# Patient Record
Sex: MALE | Race: WHITE | NOT HISPANIC OR LATINO | Employment: OTHER | URBAN - METROPOLITAN AREA
[De-identification: names, ages, dates, MRNs, and addresses within clinical notes are randomized per-mention and may not be internally consistent; named-entity substitution may affect disease eponyms.]

---

## 2018-02-26 ENCOUNTER — OFFICE VISIT (OUTPATIENT)
Dept: ORTHOPEDIC SURGERY | Facility: CLINIC | Age: 73
End: 2018-02-26

## 2018-02-26 ENCOUNTER — TELEPHONE (OUTPATIENT)
Dept: ORTHOPEDIC SURGERY | Facility: CLINIC | Age: 73
End: 2018-02-26

## 2018-02-26 VITALS — HEIGHT: 71 IN | BODY MASS INDEX: 28.7 KG/M2 | TEMPERATURE: 98.3 F | WEIGHT: 205 LBS

## 2018-02-26 DIAGNOSIS — S83.241A TEAR OF MEDIAL MENISCUS OF RIGHT KNEE, CURRENT, UNSPECIFIED TEAR TYPE, INITIAL ENCOUNTER: ICD-10-CM

## 2018-02-26 DIAGNOSIS — M17.11 PRIMARY LOCALIZED OSTEOARTHROSIS OF RIGHT LOWER LEG: Primary | ICD-10-CM

## 2018-02-26 PROCEDURE — 99214 OFFICE O/P EST MOD 30 MIN: CPT | Performed by: ORTHOPAEDIC SURGERY

## 2018-02-26 RX ORDER — PANTOPRAZOLE SODIUM 40 MG/1
40 TABLET, DELAYED RELEASE ORAL EVERY MORNING
Refills: 1 | COMMUNITY
Start: 2017-12-13

## 2018-02-26 NOTE — PROGRESS NOTES
"Knee Exam      Patient: Robin Karimi    YOB: 1945 72 y.o. male    Chief Complaints: knee hurts    History of Present Illness: Patient reports a 6 week history of moderate to severe constant stabbing aching pain with uncomfortable feeling of popping and giving way to the right knee with associated swelling worse with standing or sitting for a short period of time as well as driving and walking.  He has brief intermittent relief with use of anti-inflammatories.    It began when he twisted while shoveling snow 6 weeks ago.  Prior to that he had mild to moderate intermittent aching pain in the right knee.    He was injected with steroid in his buttock not his knee by his PCP about 6 weeks ago and had incomplete transient relief.  He cannot take anti-inflammatories due to stomach ulcer.    He had his left knee scoped in June 2015 and his left rotator cuff repair inferior 2016 and states that the shoulder is doing well  HPI    ROS: knee pain no numbness or tingling  Past Medical History:   Diagnosis Date   • Asthma    • Diabetes mellitus    • Difficulty sleeping    • Hypertension    • Infectious viral hepatitis    • Joint pain    • Muscle pain    • Numbness or tingling    • Ringing in ears    • Stiffness in joint        Physical Exam:   Vitals:    02/26/18 0856   Temp: 98.3 °F (36.8 °C)   TempSrc: Temporal Artery    Weight: 93 kg (205 lb)   Height: 180.3 cm (71\")     Well developed with normal mood.He is with his wife whom I know very well and is cared for her mother in the past.    Nonantalgic gait.  Right knee shows mild effusion no warmth erythema.  Moderate discomfort palpation of the medial more so than lateral joint line discomfort and crepitus on patellofemoral compression.  There is exacerbation of pain with uncomfortable popping on Melinda's medially more so than laterally.  Grossly stable ligamentous exam he lacks a few degrees from full extension and has about 110° of flexion.      Radiology: 3 " views the right knee were reviewed from 2/7/18 and compared with previous AP view of the right knee.  There is significant degenerative change of the right knee most of the medial more so than patellofemoral and lateral compartments      Assessment/Plan:  Acute exacerbation of chronic right knee pain with known history of arthritis.  Given his associated increase in mechanical symptoms with an  Injury episode I worry about a displaced meniscal tear    He was fitted with a knee sleeve to give him some support she will limit squatting and kneeling or twisting activities.  I want to get an MRI to look for a type of displaced meniscal tear.    He understands he may eventually need to have knee replacement.    We had a pleasant visit and he and his wife understand to call to schedule follow-up appointment after MRI has been scheduled to review results in the office.

## 2018-03-06 ENCOUNTER — HOSPITAL ENCOUNTER (OUTPATIENT)
Dept: MRI IMAGING | Facility: HOSPITAL | Age: 73
Discharge: HOME | End: 2018-03-06
Attending: ORTHOPAEDIC SURGERY | Admitting: ORTHOPAEDIC SURGERY

## 2018-03-06 DIAGNOSIS — M17.11 PRIMARY LOCALIZED OSTEOARTHROSIS OF RIGHT LOWER LEG: ICD-10-CM

## 2018-03-06 DIAGNOSIS — S83.241A TEAR OF MEDIAL MENISCUS OF RIGHT KNEE, CURRENT, UNSPECIFIED TEAR TYPE, INITIAL ENCOUNTER: ICD-10-CM

## 2018-03-06 PROCEDURE — 73721 MRI JNT OF LWR EXTRE W/O DYE: CPT

## 2018-03-12 ENCOUNTER — OFFICE VISIT (OUTPATIENT)
Dept: ORTHOPEDIC SURGERY | Facility: CLINIC | Age: 73
End: 2018-03-12

## 2018-03-12 VITALS — WEIGHT: 205 LBS | HEIGHT: 71 IN | BODY MASS INDEX: 28.7 KG/M2 | TEMPERATURE: 98.6 F

## 2018-03-12 DIAGNOSIS — M17.11 PRIMARY LOCALIZED OSTEOARTHROSIS OF RIGHT LOWER LEG: ICD-10-CM

## 2018-03-12 DIAGNOSIS — S83.241D TEAR OF MEDIAL MENISCUS OF RIGHT KNEE, CURRENT, UNSPECIFIED TEAR TYPE, SUBSEQUENT ENCOUNTER: Primary | ICD-10-CM

## 2018-03-12 PROBLEM — S83.241A TEAR OF MEDIAL MENISCUS OF RIGHT KNEE, CURRENT: Status: ACTIVE | Noted: 2018-03-12

## 2018-03-12 PROCEDURE — 99213 OFFICE O/P EST LOW 20 MIN: CPT | Performed by: ORTHOPAEDIC SURGERY

## 2018-03-12 RX ORDER — CEFAZOLIN SODIUM 2 G/100ML
2 INJECTION, SOLUTION INTRAVENOUS ONCE
Status: CANCELLED | OUTPATIENT
Start: 2018-03-20 | End: 2018-03-20

## 2018-03-12 NOTE — PROGRESS NOTES
"Knee Exam      Patient: Robin Karimi    YOB: 1945 72 y.o. male    Chief Complaints: knee hurts    History of Present Illness: Patient is seen back today with exacerbation of chronic right knee pain with medial meniscal tear that was worsened after shoveling snow about 8 weeks ago.    He cannot take anti-inflammatories due to stomach ulcer and had a steroid injection but not in his ankle but I believe in his buttock as PCP about 8 weeks ago.  He reports persistent complaints of moderate intermittent aching throbbing in the right knee and has difficulty straightening it out after prolonged sitting and standing somewhat of a palpable and audible pop with improvement in pain.  HPI    ROS: knee pain, no fevers chills chest pain or shortness of breath  Past Medical History:   Diagnosis Date   • Asthma    • Diabetes mellitus    • Difficulty sleeping    • Hypertension    • Infectious viral hepatitis    • Joint pain    • Muscle pain    • Numbness or tingling    • Ringing in ears    • Stiffness in joint        Physical Exam:   Vitals:    03/12/18 1316   Temp: 98.6 °F (37 °C)   Weight: 93 kg (205 lb)   Height: 180.3 cm (71\")     Well developed with normal mood.He is with his wife.  Right knee shows no warmth erythema or effusion.  There is mild discomfort over the medial more so than lateral joint lines of exacerbation of pain with Melinda's.  Grossly stable ligamentous exam.  Right calf and thigh were nontender without sign of DVT.  Grossly sensate light touch right lower extremity      Radiology: MRI films and report of the right knee dated 3/6/18 reviewed which show a complex degenerative tear of the medial meniscus with fragmentation and advanced medial compartment arthritis as well as mild lateral and patellofemoral arthritis.      Assessment/Plan:  1.  Exacerbation of chronic right knee pain with medial meniscal tear now with mechanical symptoms after injury episode.    I have discussed operative and " non-operative treatment options and although no guarantees could be given,  pt would like to proceed with operative treatment. Plan is for knee scope with debridement as needed. We discussed the  procedure in detail including use of a model as well as  post op protocol. Risks, benefits, potential outcomes, and complications were reviewed and can include but are not limited to heart attack, stroke, death, pneumonia, infection, bleeding, damage to blood vessels, nerves, or tendons, blood clot, pulmonary embolus,persistent or worsening symptoms, stiffness, need for subsequent surgery, and failure to return to presurgery and precondition levels of activity.Pt voiced a clear understanding of these. This will be scheduled on an outpatient basis at a mutually convenient time. Pt was encouraged to call with any questions prior to surgery.      He understands he may eventually need to have a knee replacement would like to try less invasive option understanding that he could make things worse.    He does wife told me how much stable was appreciated my care and I explained him that although he had good results of his left knee after scope could not guarantee similar results on the right.

## 2018-03-14 ENCOUNTER — APPOINTMENT (OUTPATIENT)
Dept: PREADMISSION TESTING | Facility: HOSPITAL | Age: 73
End: 2018-03-14

## 2018-03-14 VITALS
HEART RATE: 73 BPM | DIASTOLIC BLOOD PRESSURE: 81 MMHG | OXYGEN SATURATION: 96 % | BODY MASS INDEX: 30.38 KG/M2 | TEMPERATURE: 97.9 F | RESPIRATION RATE: 18 BRPM | SYSTOLIC BLOOD PRESSURE: 148 MMHG | HEIGHT: 71 IN | WEIGHT: 217 LBS

## 2018-03-14 LAB
ANION GAP SERPL CALCULATED.3IONS-SCNC: 12.8 MMOL/L
BUN BLD-MCNC: 19 MG/DL (ref 8–23)
BUN/CREAT SERPL: 15.2 (ref 7–25)
CALCIUM SPEC-SCNC: 9.6 MG/DL (ref 8.6–10.5)
CHLORIDE SERPL-SCNC: 97 MMOL/L (ref 98–107)
CO2 SERPL-SCNC: 29.2 MMOL/L (ref 22–29)
CREAT BLD-MCNC: 1.25 MG/DL (ref 0.76–1.27)
DEPRECATED RDW RBC AUTO: 44.1 FL (ref 37–54)
ERYTHROCYTE [DISTWIDTH] IN BLOOD BY AUTOMATED COUNT: 13.7 % (ref 11.5–14.5)
GFR SERPL CREATININE-BSD FRML MDRD: 57 ML/MIN/1.73
GLUCOSE BLD-MCNC: 161 MG/DL (ref 65–99)
HCT VFR BLD AUTO: 42.5 % (ref 40.4–52.2)
HGB BLD-MCNC: 14 G/DL (ref 13.7–17.6)
MCH RBC QN AUTO: 29.1 PG (ref 27–32.7)
MCHC RBC AUTO-ENTMCNC: 32.9 G/DL (ref 32.6–36.4)
MCV RBC AUTO: 88.4 FL (ref 79.8–96.2)
PLATELET # BLD AUTO: 188 10*3/MM3 (ref 140–500)
PMV BLD AUTO: 12.1 FL (ref 6–12)
POTASSIUM BLD-SCNC: 4.2 MMOL/L (ref 3.5–5.2)
RBC # BLD AUTO: 4.81 10*6/MM3 (ref 4.6–6)
SODIUM BLD-SCNC: 139 MMOL/L (ref 136–145)
WBC NRBC COR # BLD: 9.17 10*3/MM3 (ref 4.5–10.7)

## 2018-03-14 PROCEDURE — 85027 COMPLETE CBC AUTOMATED: CPT | Performed by: ORTHOPAEDIC SURGERY

## 2018-03-14 PROCEDURE — 80048 BASIC METABOLIC PNL TOTAL CA: CPT | Performed by: ORTHOPAEDIC SURGERY

## 2018-03-14 PROCEDURE — 36415 COLL VENOUS BLD VENIPUNCTURE: CPT

## 2018-03-14 PROCEDURE — 93005 ELECTROCARDIOGRAM TRACING: CPT

## 2018-03-14 PROCEDURE — 93010 ELECTROCARDIOGRAM REPORT: CPT | Performed by: INTERNAL MEDICINE

## 2018-03-14 RX ORDER — CHOLECALCIFEROL (VITAMIN D3) 125 MCG
1 CAPSULE ORAL TAKE AS DIRECTED
COMMUNITY
End: 2023-01-26

## 2018-03-14 RX ORDER — MELATONIN
1000 DAILY
COMMUNITY
End: 2021-06-15 | Stop reason: HOSPADM

## 2018-03-14 RX ORDER — SENNOSIDES 8.6 MG
650 CAPSULE ORAL EVERY 8 HOURS PRN
COMMUNITY
End: 2019-02-14 | Stop reason: HOSPADM

## 2018-03-14 RX ORDER — NITROGLYCERIN 0.4 MG/1
0.4 TABLET SUBLINGUAL
COMMUNITY

## 2018-03-14 NOTE — DISCHARGE INSTRUCTIONS
Take the following medications the morning of surgery with a small sip of water:    PANTOPRAZOLE, VALSARTAN, AMLODIPINE AND METOPROLOL.    ARRIVE AT 5:45    General Instructions:  • Do not eat solid food after midnight the night before surgery.  • You may drink clear liquids day of surgery but must stop at least one hour before your hospital arrival time.  • It is beneficial for you to have a clear drink that contains carbohydrates the day of surgery.  We suggest a 12 to 20 ounce bottle of Gatorade or Powerade for non-diabetic patients or a 12 to 20 ounce bottle of G2 or Powerade Zero for diabetic patients. (Pediatric patients, are not advised to drink a 12 to 20 ounce carbohydrate drink)    Clear liquids are liquids you can see through.  Nothing red in color.     Plain water                               Sports drinks  Sodas                                   Gelatin (Jell-O)  Fruit juices without pulp such as white grape juice and apple juice  Popsicles that contain no fruit or yogurt  Tea or coffee (no cream or milk added)  Gatorade / Powerade  G2 / Powerade Zero    • Infants may have breast milk up to four hours before surgery.  • Infants drinking formula may drink formula up to six hours before surgery.   • Patients who avoid smoking, chewing tobacco and alcohol for 4 weeks prior to surgery have a reduced risk of post-operative complications.  Quit smoking as many days before surgery as you can.  • Do not smoke, use chewing tobacco or drink alcohol the day of surgery.   • If applicable bring your C-PAP/ BI-PAP machine.  • Bring any papers given to you in the doctor’s office.  • Wear clean comfortable clothes and socks.  • Do not wear contact lenses or make-up.  Bring a case for your glasses.   • Bring crutches or walker if applicable.  • Remove all piercings.  Leave jewelry and any other valuables at home.  • Hair extensions with metal clips must be removed prior to surgery.  • The Pre-Admission Testing nurse  will instruct you to bring medications if unable to obtain an accurate list in Pre-Admission Testing.        If you were given a blood bank ID arm band remember to bring it with you the day of surgery.    Preventing a Surgical Site Infection:  • For 2 to 3 days before surgery, avoid shaving with a razor because the razor can irritate skin and make it easier to develop an infection.  • The night prior to surgery sleep in a clean bed with clean clothing.  Do not allow pets to sleep with you.  • Shower on the morning of surgery using a fresh bar of anti-bacterial soap (such as Dial) and clean washcloth.  Dry with a clean towel and dress in clean clothing.  • Ask your surgeon if you will be receiving antibiotics prior to surgery.  • Make sure you, your family, and all healthcare providers clean their hands with soap and water or an alcohol based hand  before caring for you or your wound.    Day of surgery:  Upon arrival, a Pre-op nurse and Anesthesiologist will review your health history, obtain vital signs, and answer questions you may have.  The only belongings needed at this time will be your home medications and if applicable your C-PAP/BI-PAP machine.  If you are staying overnight your family can leave the rest of your belongings in the car and bring them to your room later.  A Pre-op nurse will start an IV and you may receive medication in preparation for surgery, including something to help you relax.  Your family will be able to see you in the Pre-op area.  While you are in surgery your family should notify the waiting room  if they leave the waiting room area and provide a contact phone number.    Please be aware that surgery does come with discomfort.  We want to make every effort to control your discomfort so please discuss any uncontrolled symptoms with your nurse.   Your doctor will most likely have prescribed pain medications.      If you are going home after surgery you will receive  individualized written care instructions before being discharged.  A responsible adult must drive you to and from the hospital on the day of your surgery and stay with you for 24 hours.    If you are staying overnight following surgery, you will be transported to your hospital room following the recovery period.  [facility] has all private rooms.    If you have any questions please call Pre-Admission Testing at [phone].  Deductibles and co-payments are collected on the day of service. Please be prepared to pay the required co-pay, deductible or deposit on the day of service as defined by your plan.

## 2018-03-15 ENCOUNTER — TELEPHONE (OUTPATIENT)
Dept: ORTHOPEDIC SURGERY | Facility: CLINIC | Age: 73
End: 2018-03-15

## 2018-03-15 NOTE — TELEPHONE ENCOUNTER
----- Message from Logan Roblero MD sent at 3/15/2018  9:43 AM EDT -----  Can you please check on this to see if he needs to see cardiology..surgery scheduled for tues.  Last operated on him on 2/16 thanks

## 2018-03-19 NOTE — H&P
"   Patient: Robin Karimi     YOB: 1945 72 y.o. male     Chief Complaints: knee hurts     History of Present Illness: Patient is seen back today with exacerbation of chronic right knee pain with medial meniscal tear that was worsened after shoveling snow about 8 weeks ago.     He cannot take anti-inflammatories due to stomach ulcer and had a steroid injection but not in his ankle but I believe in his buttock as PCP about 8 weeks ago.  He reports persistent complaints of moderate intermittent aching throbbing in the right knee and has difficulty straightening it out after prolonged sitting and standing somewhat of a palpable and audible pop with improvement in pain.  HPI     ROS: knee pain, no fevers chills chest pain or shortness of breath  Medical History        Past Medical History:   Diagnosis Date   • Asthma     • Diabetes mellitus     • Difficulty sleeping     • Hypertension     • Infectious viral hepatitis     • Joint pain     • Muscle pain     • Numbness or tingling     • Ringing in ears     • Stiffness in joint              Physical Exam:   Vitals       Vitals:     03/12/18 1316   Temp: 98.6 °F (37 °C)   Weight: 93 kg (205 lb)   Height: 180.3 cm (71\")         Well developed with normal mood.He is with his wife.  Right knee shows no warmth erythema or effusion.  There is mild discomfort over the medial more so than lateral joint lines of exacerbation of pain with Melinda's.  Grossly stable ligamentous exam.  Right calf and thigh were nontender without sign of DVT.  Grossly sensate light touch right lower extremity        Radiology: MRI films and report of the right knee dated 3/6/18 reviewed which show a complex degenerative tear of the medial meniscus with fragmentation and advanced medial compartment arthritis as well as mild lateral and patellofemoral arthritis.        Assessment/Plan:  1.  Exacerbation of chronic right knee pain with medial meniscal tear now with mechanical symptoms after " injury episode.     I have discussed operative and non-operative treatment options and although no guarantees could be given,  pt would like to proceed with operative treatment. Plan is for knee scope with debridement as needed. We discussed the  procedure in detail including use of a model as well as  post op protocol. Risks, benefits, potential outcomes, and complications were reviewed and can include but are not limited to heart attack, stroke, death, pneumonia, infection, bleeding, damage to blood vessels, nerves, or tendons, blood clot, pulmonary embolus,persistent or worsening symptoms, stiffness, need for subsequent surgery, and failure to return to presurgery and precondition levels of activity.Pt voiced a clear understanding of these. This will be scheduled on an outpatient basis at a mutually convenient time. Pt was encouraged to call with any questions prior to surgery.        He understands he may eventually need to have a knee replacement would like to try less invasive option understanding that he could make things worse.     He does wife told me how much stable was appreciated my care and I explained him that although he had good results of his left knee after scope could not guarantee similar results on the right.

## 2018-03-20 ENCOUNTER — HOSPITAL ENCOUNTER (OUTPATIENT)
Facility: HOSPITAL | Age: 73
Setting detail: HOSPITAL OUTPATIENT SURGERY
Discharge: HOME | End: 2018-03-20
Attending: ORTHOPAEDIC SURGERY | Admitting: ORTHOPAEDIC SURGERY

## 2018-03-20 ENCOUNTER — ANESTHESIA EVENT (OUTPATIENT)
Dept: PERIOP | Facility: HOSPITAL | Age: 73
End: 2018-03-20

## 2018-03-20 ENCOUNTER — ANESTHESIA (OUTPATIENT)
Dept: PERIOP | Facility: HOSPITAL | Age: 73
End: 2018-03-20

## 2018-03-20 VITALS
HEART RATE: 71 BPM | BODY MASS INDEX: 30.27 KG/M2 | WEIGHT: 217 LBS | RESPIRATION RATE: 16 BRPM | DIASTOLIC BLOOD PRESSURE: 77 MMHG | TEMPERATURE: 98.4 F | SYSTOLIC BLOOD PRESSURE: 139 MMHG | OXYGEN SATURATION: 94 %

## 2018-03-20 DIAGNOSIS — S83.241D TEAR OF MEDIAL MENISCUS OF RIGHT KNEE, CURRENT, UNSPECIFIED TEAR TYPE, SUBSEQUENT ENCOUNTER: ICD-10-CM

## 2018-03-20 DIAGNOSIS — M17.11 PRIMARY LOCALIZED OSTEOARTHROSIS OF RIGHT LOWER LEG: ICD-10-CM

## 2018-03-20 LAB — GLUCOSE BLDC GLUCOMTR-MCNC: 193 MG/DL (ref 70–130)

## 2018-03-20 PROCEDURE — 82962 GLUCOSE BLOOD TEST: CPT

## 2018-03-20 PROCEDURE — 25010000002 ONDANSETRON PER 1 MG: Performed by: NURSE ANESTHETIST, CERTIFIED REGISTERED

## 2018-03-20 PROCEDURE — 25010000002 PROPOFOL 10 MG/ML EMULSION: Performed by: NURSE ANESTHETIST, CERTIFIED REGISTERED

## 2018-03-20 PROCEDURE — 25010000002 DEXAMETHASONE PER 1 MG: Performed by: NURSE ANESTHETIST, CERTIFIED REGISTERED

## 2018-03-20 PROCEDURE — 25010000002 HYDROMORPHONE PER 4 MG: Performed by: NURSE ANESTHETIST, CERTIFIED REGISTERED

## 2018-03-20 PROCEDURE — 25010000002 MIDAZOLAM PER 1 MG: Performed by: ANESTHESIOLOGY

## 2018-03-20 PROCEDURE — 25010000002 FENTANYL CITRATE (PF) 100 MCG/2ML SOLUTION: Performed by: NURSE ANESTHETIST, CERTIFIED REGISTERED

## 2018-03-20 PROCEDURE — 29881 ARTHRS KNE SRG MNISECTMY M/L: CPT | Performed by: ORTHOPAEDIC SURGERY

## 2018-03-20 RX ORDER — SODIUM CHLORIDE 0.9 % (FLUSH) 0.9 %
1-10 SYRINGE (ML) INJECTION AS NEEDED
Status: DISCONTINUED | OUTPATIENT
Start: 2018-03-20 | End: 2018-03-20 | Stop reason: HOSPADM

## 2018-03-20 RX ORDER — LIDOCAINE HYDROCHLORIDE 20 MG/ML
INJECTION, SOLUTION INFILTRATION; PERINEURAL AS NEEDED
Status: DISCONTINUED | OUTPATIENT
Start: 2018-03-20 | End: 2018-03-20 | Stop reason: SURG

## 2018-03-20 RX ORDER — LIDOCAINE HYDROCHLORIDE 10 MG/ML
0.5 INJECTION, SOLUTION EPIDURAL; INFILTRATION; INTRACAUDAL; PERINEURAL ONCE AS NEEDED
Status: COMPLETED | OUTPATIENT
Start: 2018-03-20 | End: 2018-03-20

## 2018-03-20 RX ORDER — SODIUM CHLORIDE, SODIUM LACTATE, POTASSIUM CHLORIDE, CALCIUM CHLORIDE 600; 310; 30; 20 MG/100ML; MG/100ML; MG/100ML; MG/100ML
9 INJECTION, SOLUTION INTRAVENOUS CONTINUOUS
Status: DISCONTINUED | OUTPATIENT
Start: 2018-03-20 | End: 2018-03-20 | Stop reason: HOSPADM

## 2018-03-20 RX ORDER — PROMETHAZINE HYDROCHLORIDE 25 MG/ML
12.5 INJECTION, SOLUTION INTRAMUSCULAR; INTRAVENOUS ONCE AS NEEDED
Status: DISCONTINUED | OUTPATIENT
Start: 2018-03-20 | End: 2018-03-20 | Stop reason: HOSPADM

## 2018-03-20 RX ORDER — HYDRALAZINE HYDROCHLORIDE 20 MG/ML
5 INJECTION INTRAMUSCULAR; INTRAVENOUS
Status: DISCONTINUED | OUTPATIENT
Start: 2018-03-20 | End: 2018-03-20 | Stop reason: HOSPADM

## 2018-03-20 RX ORDER — OXYCODONE AND ACETAMINOPHEN 7.5; 325 MG/1; MG/1
1 TABLET ORAL ONCE AS NEEDED
Status: DISCONTINUED | OUTPATIENT
Start: 2018-03-20 | End: 2018-03-20 | Stop reason: HOSPADM

## 2018-03-20 RX ORDER — SODIUM CHLORIDE, SODIUM LACTATE, POTASSIUM CHLORIDE, AND CALCIUM CHLORIDE .6; .31; .03; .02 G/100ML; G/100ML; G/100ML; G/100ML
IRRIGANT IRRIGATION AS NEEDED
Status: DISCONTINUED | OUTPATIENT
Start: 2018-03-20 | End: 2018-03-20 | Stop reason: HOSPADM

## 2018-03-20 RX ORDER — PROMETHAZINE HYDROCHLORIDE 25 MG/1
25 SUPPOSITORY RECTAL ONCE AS NEEDED
Status: DISCONTINUED | OUTPATIENT
Start: 2018-03-20 | End: 2018-03-20 | Stop reason: HOSPADM

## 2018-03-20 RX ORDER — PROPOFOL 10 MG/ML
VIAL (ML) INTRAVENOUS AS NEEDED
Status: DISCONTINUED | OUTPATIENT
Start: 2018-03-20 | End: 2018-03-20 | Stop reason: SURG

## 2018-03-20 RX ORDER — PROMETHAZINE HYDROCHLORIDE 25 MG/1
12.5 TABLET ORAL ONCE AS NEEDED
Status: DISCONTINUED | OUTPATIENT
Start: 2018-03-20 | End: 2018-03-20 | Stop reason: HOSPADM

## 2018-03-20 RX ORDER — CEPHALEXIN 500 MG/1
500 CAPSULE ORAL EVERY 6 HOURS
Qty: 8 CAPSULE | Refills: 0 | Status: SHIPPED | OUTPATIENT
Start: 2018-03-20 | End: 2018-03-22

## 2018-03-20 RX ORDER — FENTANYL CITRATE 50 UG/ML
INJECTION, SOLUTION INTRAMUSCULAR; INTRAVENOUS AS NEEDED
Status: DISCONTINUED | OUTPATIENT
Start: 2018-03-20 | End: 2018-03-20 | Stop reason: SURG

## 2018-03-20 RX ORDER — MIDAZOLAM HYDROCHLORIDE 1 MG/ML
1 INJECTION INTRAMUSCULAR; INTRAVENOUS
Status: DISCONTINUED | OUTPATIENT
Start: 2018-03-20 | End: 2018-03-20 | Stop reason: HOSPADM

## 2018-03-20 RX ORDER — NALOXONE HCL 0.4 MG/ML
0.2 VIAL (ML) INJECTION AS NEEDED
Status: DISCONTINUED | OUTPATIENT
Start: 2018-03-20 | End: 2018-03-20 | Stop reason: HOSPADM

## 2018-03-20 RX ORDER — FENTANYL CITRATE 50 UG/ML
50 INJECTION, SOLUTION INTRAMUSCULAR; INTRAVENOUS
Status: DISCONTINUED | OUTPATIENT
Start: 2018-03-20 | End: 2018-03-20 | Stop reason: HOSPADM

## 2018-03-20 RX ORDER — ONDANSETRON 2 MG/ML
4 INJECTION INTRAMUSCULAR; INTRAVENOUS ONCE AS NEEDED
Status: COMPLETED | OUTPATIENT
Start: 2018-03-20 | End: 2018-03-20

## 2018-03-20 RX ORDER — FAMOTIDINE 10 MG/ML
20 INJECTION, SOLUTION INTRAVENOUS ONCE
Status: COMPLETED | OUTPATIENT
Start: 2018-03-20 | End: 2018-03-20

## 2018-03-20 RX ORDER — CEFAZOLIN SODIUM 2 G/100ML
2 INJECTION, SOLUTION INTRAVENOUS ONCE
Status: DISCONTINUED | OUTPATIENT
Start: 2018-03-20 | End: 2018-03-20 | Stop reason: HOSPADM

## 2018-03-20 RX ORDER — OXYCODONE HYDROCHLORIDE AND ACETAMINOPHEN 5; 325 MG/1; MG/1
1 TABLET ORAL ONCE AS NEEDED
Status: DISCONTINUED | OUTPATIENT
Start: 2018-03-20 | End: 2018-03-20 | Stop reason: HOSPADM

## 2018-03-20 RX ORDER — MIDAZOLAM HYDROCHLORIDE 1 MG/ML
2 INJECTION INTRAMUSCULAR; INTRAVENOUS
Status: DISCONTINUED | OUTPATIENT
Start: 2018-03-20 | End: 2018-03-20 | Stop reason: HOSPADM

## 2018-03-20 RX ORDER — DEXAMETHASONE SODIUM PHOSPHATE 10 MG/ML
INJECTION INTRAMUSCULAR; INTRAVENOUS AS NEEDED
Status: DISCONTINUED | OUTPATIENT
Start: 2018-03-20 | End: 2018-03-20 | Stop reason: SURG

## 2018-03-20 RX ORDER — HYDROCODONE BITARTRATE AND ACETAMINOPHEN 7.5; 325 MG/1; MG/1
1 TABLET ORAL ONCE AS NEEDED
Status: COMPLETED | OUTPATIENT
Start: 2018-03-20 | End: 2018-03-20

## 2018-03-20 RX ORDER — BUPIVACAINE HYDROCHLORIDE AND EPINEPHRINE 5; 5 MG/ML; UG/ML
INJECTION, SOLUTION PERINEURAL AS NEEDED
Status: DISCONTINUED | OUTPATIENT
Start: 2018-03-20 | End: 2018-03-20 | Stop reason: HOSPADM

## 2018-03-20 RX ORDER — ONDANSETRON 2 MG/ML
INJECTION INTRAMUSCULAR; INTRAVENOUS AS NEEDED
Status: DISCONTINUED | OUTPATIENT
Start: 2018-03-20 | End: 2018-03-20 | Stop reason: SURG

## 2018-03-20 RX ORDER — HYDROMORPHONE HCL 110MG/55ML
PATIENT CONTROLLED ANALGESIA SYRINGE INTRAVENOUS AS NEEDED
Status: DISCONTINUED | OUTPATIENT
Start: 2018-03-20 | End: 2018-03-20 | Stop reason: SURG

## 2018-03-20 RX ORDER — FLUMAZENIL 0.1 MG/ML
0.2 INJECTION INTRAVENOUS AS NEEDED
Status: DISCONTINUED | OUTPATIENT
Start: 2018-03-20 | End: 2018-03-20 | Stop reason: HOSPADM

## 2018-03-20 RX ORDER — PROMETHAZINE HYDROCHLORIDE 25 MG/1
25 TABLET ORAL ONCE AS NEEDED
Status: DISCONTINUED | OUTPATIENT
Start: 2018-03-20 | End: 2018-03-20 | Stop reason: HOSPADM

## 2018-03-20 RX ORDER — EPHEDRINE SULFATE 50 MG/ML
5 INJECTION, SOLUTION INTRAVENOUS ONCE AS NEEDED
Status: DISCONTINUED | OUTPATIENT
Start: 2018-03-20 | End: 2018-03-20 | Stop reason: HOSPADM

## 2018-03-20 RX ORDER — OXYCODONE HYDROCHLORIDE AND ACETAMINOPHEN 5; 325 MG/1; MG/1
1-2 TABLET ORAL EVERY 4 HOURS PRN
Qty: 80 TABLET | Refills: 0 | Status: SHIPPED | OUTPATIENT
Start: 2018-03-20 | End: 2018-12-03

## 2018-03-20 RX ORDER — DIPHENHYDRAMINE HYDROCHLORIDE 50 MG/ML
12.5 INJECTION INTRAMUSCULAR; INTRAVENOUS
Status: DISCONTINUED | OUTPATIENT
Start: 2018-03-20 | End: 2018-03-20 | Stop reason: HOSPADM

## 2018-03-20 RX ORDER — LABETALOL HYDROCHLORIDE 5 MG/ML
5 INJECTION, SOLUTION INTRAVENOUS
Status: DISCONTINUED | OUTPATIENT
Start: 2018-03-20 | End: 2018-03-20 | Stop reason: HOSPADM

## 2018-03-20 RX ADMIN — HYDROMORPHONE HYDROCHLORIDE 1 MG: 2 INJECTION INTRAMUSCULAR; INTRAVENOUS; SUBCUTANEOUS at 09:53

## 2018-03-20 RX ADMIN — LIDOCAINE HYDROCHLORIDE 0.5 ML: 10 INJECTION, SOLUTION EPIDURAL; INFILTRATION; INTRACAUDAL; PERINEURAL at 07:20

## 2018-03-20 RX ADMIN — MIDAZOLAM 2 MG: 1 INJECTION INTRAMUSCULAR; INTRAVENOUS at 07:32

## 2018-03-20 RX ADMIN — FAMOTIDINE 20 MG: 10 INJECTION INTRAVENOUS at 07:32

## 2018-03-20 RX ADMIN — SODIUM CHLORIDE, POTASSIUM CHLORIDE, SODIUM LACTATE AND CALCIUM CHLORIDE 9 ML/HR: 600; 310; 30; 20 INJECTION, SOLUTION INTRAVENOUS at 11:14

## 2018-03-20 RX ADMIN — HYDROMORPHONE HYDROCHLORIDE 1 MG: 2 INJECTION INTRAMUSCULAR; INTRAVENOUS; SUBCUTANEOUS at 10:04

## 2018-03-20 RX ADMIN — HYDROCODONE BITARTRATE AND ACETAMINOPHEN 1 TABLET: 7.5; 325 TABLET ORAL at 11:38

## 2018-03-20 RX ADMIN — SODIUM CHLORIDE, POTASSIUM CHLORIDE, SODIUM LACTATE AND CALCIUM CHLORIDE 9 ML/HR: 600; 310; 30; 20 INJECTION, SOLUTION INTRAVENOUS at 07:20

## 2018-03-20 RX ADMIN — LIDOCAINE HYDROCHLORIDE 100 MG: 20 INJECTION, SOLUTION INFILTRATION; PERINEURAL at 09:20

## 2018-03-20 RX ADMIN — FENTANYL CITRATE 50 MCG: 50 INJECTION INTRAMUSCULAR; INTRAVENOUS at 09:46

## 2018-03-20 RX ADMIN — DEXAMETHASONE SODIUM PHOSPHATE 4 MG: 10 INJECTION INTRAMUSCULAR; INTRAVENOUS at 09:30

## 2018-03-20 RX ADMIN — FENTANYL CITRATE 50 MCG: 50 INJECTION INTRAMUSCULAR; INTRAVENOUS at 09:25

## 2018-03-20 RX ADMIN — PROPOFOL 200 MG: 10 INJECTION, EMULSION INTRAVENOUS at 09:20

## 2018-03-20 RX ADMIN — ONDANSETRON 4 MG: 2 INJECTION, SOLUTION INTRAMUSCULAR; INTRAVENOUS at 10:47

## 2018-03-20 RX ADMIN — ONDANSETRON 4 MG: 2 INJECTION INTRAMUSCULAR; INTRAVENOUS at 09:30

## 2018-03-20 NOTE — BRIEF OP NOTE
KNEE ARTHROSCOPY  Progress Note    Robin Karimi  3/20/2018    Pre-op Diagnosis:   Primary localized osteoarthrosis of right lower leg [M17.11]  Tear of medial meniscus of right knee, current, unspecified tear type, subsequent encounter [S83.241D]       Post-Op Diagnosis Codes:     * Primary localized osteoarthrosis of right lower leg [M17.11]     * Tear of medial meniscus of right knee, current, unspecified tear type, subsequent encounter [S83.241D]  L.oose body  Procedure/CPT® Codes:      Procedure(s):  RIGHT KNEE ARTHROSCOPY, PARTIAL MEDIAL MENISECTOMY, ABRASSSION CHONDROPLASTY, AND LOOSE BODY REMOVAL    Surgeon(s):  Logan Roblero MD    Anesthesia: General    Staff:   Circulator: Myrna Morgan RN  Scrub Person: Sintia Murguia  Vendor Representative: Logan Voss    Estimated Blood Loss: minimal    Urine Voided: 0 mL    Specimens:      none                Drains: none    Findings: see dict    Complications: none  TT 24 min      Logan Roblero MD     Date: 3/20/2018  Time: 10:14 AM

## 2018-03-20 NOTE — ANESTHESIA PROCEDURE NOTES
Airway  Urgency: elective    Airway not difficult    General Information and Staff    Patient location during procedure: OR  Anesthesiologist: DARREN BANEGAS  CRNA: KATERINA PITTMAN    Indications and Patient Condition  Indications for airway management: airway protection    Preoxygenated: yes  Mask difficulty assessment: 0 - not attempted    Final Airway Details  Final airway type: supraglottic airway      Successful airway: classic  Size 5    Number of attempts at approach: 1    Additional Comments  LMA inserted without difficulty.  Lips and teeth intact as preop condition.  No signs or symptoms of gastric regurgitation.  Seal with minimal pressure and secure.

## 2018-03-20 NOTE — ANESTHESIA PREPROCEDURE EVALUATION
Anesthesia Evaluation     Patient summary reviewed and Nursing notes reviewed   no history of anesthetic complications:               Airway   Mallampati: II  TM distance: >3 FB  Neck ROM: limited  no difficulty expected and Possible difficult intubation  Dental - normal exam     Pulmonary     breath sounds clear to auscultation  (+) asthma, sleep apnea,   (-) shortness of breath, decreased breath sounds, wheezes  Cardiovascular - normal exam  Exercise tolerance: good (4-7 METS)    Patient on routine beta blocker and Beta blocker given within 24 hours of surgery  Rhythm: regular  Rate: normal    (+) hypertension, CAD,   (-) past MI, angina, CHF, orthopnea, PND, ARRIAZA, PVD      Neuro/Psych- negative ROS  (-) seizures, neuromuscular disease, TIA, CVA, dizziness/light headedness, weakness, numbness  GI/Hepatic/Renal/Endo    (+)  GERD,  diabetes mellitus,   (-) liver disease    Musculoskeletal (-) negative ROS    Abdominal  - normal exam   Substance History - negative use  (-) alcohol use, drug use     OB/GYN negative ob/gyn ROS         Other - negative ROS                       Anesthesia Plan    ASA 3     general     intravenous induction   Anesthetic plan and risks discussed with patient.    Plan discussed with CRNA.

## 2018-03-20 NOTE — ANESTHESIA POSTPROCEDURE EVALUATION
Patient: Robin Karimi    Procedure Summary     Date:  03/20/18 Room / Location:   [facility]    Anesthesia Start:  0916 Anesthesia Stop:  1022    Procedure:  RIGHT KNEE ARTHROSCOPY, PARTIAL MEDIAL MENISECTOMY, ABRASSSION CHONDROPLASTY, AND LOOSE BODY REMOVAL (Right Knee) Diagnosis:       Primary localized osteoarthrosis of right lower leg      Tear of medial meniscus of right knee, current, unspecified tear type, subsequent encounter      (Primary localized osteoarthrosis of right lower leg [M17.11])      (Tear of medial meniscus of right knee, current, unspecified tear type, subsequent encounter [S83.241D])    Surgeon:  Logan Roblero MD Provider:  Edgardo Puckett MD    Anesthesia Type:  general ASA Status:  3          Anesthesia Type: general  Last vitals  BP   139/77 (03/20/18 1146)   Temp   36.9 °C (98.4 °F) (03/20/18 1130)   Pulse   71 (03/20/18 1146)   Resp   16 (03/20/18 1146)     SpO2   94 % (03/20/18 1146)     Post Anesthesia Care and Evaluation    Patient location during evaluation: bedside  Patient participation: complete - patient participated  Level of consciousness: awake and alert  Pain management: adequate  Airway patency: patent  Anesthetic complications: No anesthetic complications    Cardiovascular status: acceptable  Respiratory status: acceptable  Hydration status: acceptable    Comments: /77 (BP Location: Left arm, Patient Position: Sitting)   Pulse 71   Temp 36.9 °C (98.4 °F) (Temporal Artery )   Resp 16   Wt 98.4 kg (217 lb)   SpO2 94%   BMI 30.27 kg/m²

## 2018-03-20 NOTE — INTERVAL H&P NOTE
H&P updated. The patient was examined and His knee is unchanged with continued complaints of locking and catching.  There was some questionable changes on his EKG.  I discussed this with anesthesia and patient denies any chest pain he is able to walk 2 miles at least several days a week and walks 10 flights of steps regularly and does not get significantly winded after only several flights.  Anesthesia feels it is okay to proceed and this was all discussed in detail with patient.

## 2018-03-20 NOTE — OP NOTE
Facility: [facility]  Patient Name: Robin Karimi  YOB: 1945  Date: 3/20/2018  Medical Record Number: [id number]      Pre-op Diagnosis:   1.  Right knee complex medial meniscal tear  2.  Right knee medial compartment high-grade chondromalacia  3.  Right knee lateral tibial plateau chondromalacia  4.  Right knee trochlear and patella chondromalacia    Post-op Diagnosis:     1.  Right knee complex medial meniscal tear  2.  Right knee medial compartment high-grade chondromalacia  3.  Right knee lateral tibial plateau chondromalacia  4.  Right knee trochlear and patella chondromalacia  5.  Right knee loose body anterior notch with impingement    Procedure(s):  1.  Right knee partial medial meniscectomy  2.  Right knee abrasion chondroplasty medial compartment  3.  Right knee abrasion chondroplasty lateral tibial plateau  4.  Right knee abrasion chondroplasty patella and trochlea  5.  Right knee loose body removal anterior intercondylar notch    Surgeon(s):  Logan Roblero MD    Anesthesia: General  Anesthesiologist: Edgardo Puckett MD  CRNA: Olesya Ramos CRNA    Staff:   Circulator: Myrna Morgan RN  Scrub Person: Sintia Murguia  Vendor Representative: Logan Voss  Assistants : none      Estimated Blood Loss: Minimal    Tourniquet Time: 24 minutes    Specimens: none      Drains: None    Findings: See Dictation    Complications: None      Indications for procedure: Patient is had a history of known arthritis in the right knee but had onset of worsening significant mechanical symptoms after an injury approximately 8 weeks ago while shoveling snow.  These have persisted despite conservative treatment and he presents now for operative treatment understanding that he will most likely still have persistent or worsening arthritic symptoms but this should help some with mechanical symptoms.            Description of procedure:    Preoperative informed consent and  anesthesia evaluation were obtained.  IV antibiotics were administered in the surgical site was marked.  Patient was brought to the operating room placed in supine position anesthesia was induced and LMA was positioned. Well-padded tourniquet was placed right proximal thigh after exam under anesthesia showed full range of motion with stable ligamentous exam.  right leg was carefully positioned in arthroscopic leg tubbs.     right leg was prepped and draped in sterile fashion and surgical timeout was performed.  Leg was exsanguinated and pneumatic tourniquet inflated to 250 mmHg.  Anterolateral portal was established and arthroscope was introduced.      He was noted have a complex tear along the entire posterior horn body and anterior horn the medial meniscus with high-grade chondral malacia changes of medial compartment.  Anteromedial portal was created after spinal needle localization and this was probed and found to have multiple unstable fragments.  The meniscus was then debrided along the posterior horn body and anterior horn back to stable margin of around 25% using biters afshan and judicious use of electrocautery.  This peripheral rim was probed and found to be stable.  There is area of high-grade chondral malacia along the far medial aspect of the tibial plateau and as well as along the medial femoral condyle but to  a lesser extent.  Abrasion chondroplasty was performed back to stable margins.    The intercondylar notch was inspected and there was found to be synovialized impinging loose body at the base of the ACL with ACL was intact.  The lateral compartment was then inspected and there was some minor inner rim fraying but no tear of the meniscus.  There was grade 2 chondromalacial changes with fissuring and farming along the anterolateral medial aspect of the lateral tibial plateau at the base the tibial spine.  Abrasion chondroplasty was performed and this was debrided lightly back to stable margin  using shaver.    The patellofemoral performed was then inspected and found to have area of chondromalacia with some fraying and fissuring as well as groove formation along the central aspect of the trochlea as well as along the patella.  Abrasion chondroplasty was performed back to stable margins.    I then returned to the intercondylar notch and probed this synovialized loose body.  It was then debrided with a shaver such that there was no further impingement with full extension.         Arthroscopic instruments were then removed and portals were closed with 4-0 nylon suture.  The knee and portals were injected with a total of 30 cc half percent Marcaine with epinephrine.  Tourniquet was released,  wounds were hemostatic, and thigh and calf compartments are soft.  Sterile bulky dressings were applied and Ace bandages were gently placed from the toes to the upper thigh.  Patient was awakend,  transferred to stretcher and taken to recovery in stable condition

## 2018-03-28 ENCOUNTER — OFFICE VISIT (OUTPATIENT)
Dept: ORTHOPEDIC SURGERY | Facility: CLINIC | Age: 73
End: 2018-03-28

## 2018-03-28 VITALS — WEIGHT: 210.2 LBS | TEMPERATURE: 99 F | HEIGHT: 71 IN | BODY MASS INDEX: 29.43 KG/M2

## 2018-03-28 DIAGNOSIS — M17.11 PRIMARY LOCALIZED OSTEOARTHROSIS OF RIGHT LOWER LEG: ICD-10-CM

## 2018-03-28 DIAGNOSIS — S83.241D TEAR OF MEDIAL MENISCUS OF RIGHT KNEE, CURRENT, UNSPECIFIED TEAR TYPE, SUBSEQUENT ENCOUNTER: Primary | ICD-10-CM

## 2018-03-28 PROCEDURE — 99024 POSTOP FOLLOW-UP VISIT: CPT | Performed by: ORTHOPAEDIC SURGERY

## 2018-03-28 NOTE — PROGRESS NOTES
"Knee Exam      Patient: Robin Karimi    YOB: 1945 72 y.o. male    Chief Complaints: knee doing well    History of Present Illness: Follows up right knee scope from 3/20/18.  He states his pain is much better than it was before surgery and is not taking any pain medication since 1 day after surgery.  Has only mild occasional achiness in the right knee but no further mechanical symptoms.  HPI    ROS: knee pain, no fevers chills chest pain or shortness of breath  Past Medical History:   Diagnosis Date   • Asthma    • Diabetes mellitus    • Diverticulosis of colon    • GERD (gastroesophageal reflux disease)    • Hypertension    • IBS (irritable bowel syndrome)    • Infectious viral hepatitis     CHILDHOOD   • Joint pain    • Numbness or tingling     LEFT HAND   • Ringing in ears    • Sleep apnea    • Stiffness in joint    • Torn meniscus        Physical Exam:   Vitals:    03/28/18 1005   Temp: 99 °F (37.2 °C)   TempSrc: Temporal Artery    Weight: 95.3 kg (210 lb 3.2 oz)   Height: 180.3 cm (71\")     Well developed with normal mood.His wife.  Right knee shows minimal if any effusion no warmth erythema.  0-105° range of motion portals are dry clear and intact.  Right calf and thigh nontender without sign of DVT      Radiology: None Performed      Assessment/Plan:  Post right knee scope with debridement.  I discussed my findings with he and his wife in detail and I'm encouraged that his mechanical symptoms have improved but he understands he will have some persistent or possibly worsening arthritic pain.    He states that his left knee is doing quite well he scoped in the past for uses a brace when he is doing activity on it.  He may have to do the same on the right and understands he may eventually need knee replacement.    Sutures removed Steri-Strips applied.  He understands postoperative instructions and may start weaning off his crutches to a cane when comfortable and will begin physical therapy at " [facility] in [place] at his request.    I discussed his EKG findings again with he and his wife he's not had any chest pain or shortness of breath.  He states that he has an appointment set up to see his cardiologist.  He was provided with a copy of his EKG and interpretation today    We had a very pleasant visit I will see him back in 6 weeks

## 2018-05-10 ENCOUNTER — OFFICE VISIT (OUTPATIENT)
Dept: ORTHOPEDIC SURGERY | Facility: CLINIC | Age: 73
End: 2018-05-10

## 2018-05-10 VITALS — WEIGHT: 211.2 LBS | BODY MASS INDEX: 29.57 KG/M2 | TEMPERATURE: 98.2 F | HEIGHT: 71 IN

## 2018-05-10 DIAGNOSIS — S83.241D TEAR OF MEDIAL MENISCUS OF RIGHT KNEE, UNSPECIFIED TEAR TYPE, UNSPECIFIED WHETHER OLD OR CURRENT TEAR, SUBSEQUENT ENCOUNTER: Primary | ICD-10-CM

## 2018-05-10 DIAGNOSIS — M94.261 CHONDROMALACIA OF RIGHT KNEE: ICD-10-CM

## 2018-05-10 PROBLEM — S83.241A TEAR OF MEDIAL MENISCUS OF RIGHT KNEE: Status: ACTIVE | Noted: 2018-05-10

## 2018-05-10 PROCEDURE — 99024 POSTOP FOLLOW-UP VISIT: CPT | Performed by: ORTHOPAEDIC SURGERY

## 2018-05-10 NOTE — PROGRESS NOTES
Knee Exam      Patient: Robin Karimi    YOB: 1945 73 y.o. male    Chief Complaints: knee hurts    History of Present Illness: Patient had right knee arthroscopy on 3/20/18 with debridement of the medial meniscus and extensive abrasion chondroplasty of the knee and loose body removal from intercondylar notch.  He was last seen on 3/28/18 at which time he stated that his pain was much better than it was before surgery with no further mechanical symptoms.  Her given to start with physical therapy.    He's also had his left knee scoped in the past and occasionally uses a brace for activity on it    At our last visit I also discussed his EKG findings with him and he was going to set up an appointment to see his cardiologist he said that he has seen him and that everything was fine with his heart.    He states that his left knee is doing well some occasional popping and cracking.  His right knee has improved and feels like it is better than it was before surgery no further locking or catching symptoms with does get some feelings of grinding when he stands and twists.  Gets moderate intermittent aching pain anteriorly with prolonged activity it improved some with rest.  He does feel like his therapy is helping and also feels that his knee feels better when he uses his knee brace which she forgot to use when he was working in the yard.      HPI    ROS: knee pain  Past Medical History:   Diagnosis Date   • Asthma    • Diabetes mellitus    • Diverticulosis of colon    • GERD (gastroesophageal reflux disease)    • Hypertension    • IBS (irritable bowel syndrome)    • Infectious viral hepatitis     CHILDHOOD   • Joint pain    • Numbness or tingling     LEFT HAND   • Ringing in ears    • Sleep apnea    • Stiffness in joint    • Torn meniscus        Physical Exam:   There were no vitals filed for this visit.  Well developed with normal mood.Nonantalgic gait without assistive device.  Right knee showed minimal if  any swelling no warmth or erythema.  0-110° range of motion with mild discomfort over the anteromedial joint line but no focal exacerbation with Melinda's.  Mild discomfort with patellofemoral compression.  Calf and thigh are nontender without sign of DVT      Radiology:      Assessment/Plan:  1.  Right knee scope with debridement of meniscus and abrasion chondroplasty    Overall he feels like he is improving albeit slowly and still has some symptoms I think mostly related to the arthritic change to his knee with no further clear mechanical symptoms.  We discussed injection today which she wanted to hold off on.  He will continue with physical therapy and use his brace as needed and alter activities as pain allows.    We had a very pleasant visit and I will see him back in 6 weeks for examination of the right knee again.    Left knee status post arthroscopy with some degenerative change.  This side seems be doing well with some occasional popping and cracking but overall he says it feels pretty good

## 2018-06-21 ENCOUNTER — OFFICE VISIT (OUTPATIENT)
Dept: ORTHOPEDIC SURGERY | Facility: CLINIC | Age: 73
End: 2018-06-21

## 2018-06-21 VITALS — BODY MASS INDEX: 29.2 KG/M2 | HEIGHT: 71 IN | TEMPERATURE: 97.4 F | WEIGHT: 208.6 LBS

## 2018-06-21 DIAGNOSIS — S83.241D TEAR OF MEDIAL MENISCUS OF RIGHT KNEE, UNSPECIFIED TEAR TYPE, UNSPECIFIED WHETHER OLD OR CURRENT TEAR, SUBSEQUENT ENCOUNTER: ICD-10-CM

## 2018-06-21 DIAGNOSIS — M94.261 CHONDROMALACIA OF RIGHT KNEE: Primary | ICD-10-CM

## 2018-06-21 DIAGNOSIS — M94.262 CHONDROMALACIA, LEFT KNEE: ICD-10-CM

## 2018-06-21 PROCEDURE — 99213 OFFICE O/P EST LOW 20 MIN: CPT | Performed by: ORTHOPAEDIC SURGERY

## 2018-06-21 PROCEDURE — 20610 DRAIN/INJ JOINT/BURSA W/O US: CPT | Performed by: ORTHOPAEDIC SURGERY

## 2018-06-21 RX ORDER — METHYLPREDNISOLONE ACETATE 80 MG/ML
80 INJECTION, SUSPENSION INTRA-ARTICULAR; INTRALESIONAL; INTRAMUSCULAR; SOFT TISSUE
Status: COMPLETED | OUTPATIENT
Start: 2018-06-21 | End: 2018-06-21

## 2018-06-21 RX ORDER — LIDOCAINE HYDROCHLORIDE 10 MG/ML
4 INJECTION, SOLUTION EPIDURAL; INFILTRATION; INTRACAUDAL; PERINEURAL
Status: COMPLETED | OUTPATIENT
Start: 2018-06-21 | End: 2018-06-21

## 2018-06-21 RX ADMIN — LIDOCAINE HYDROCHLORIDE 4 ML: 10 INJECTION, SOLUTION EPIDURAL; INFILTRATION; INTRACAUDAL; PERINEURAL at 13:45

## 2018-06-21 RX ADMIN — METHYLPREDNISOLONE ACETATE 80 MG: 80 INJECTION, SUSPENSION INTRA-ARTICULAR; INTRALESIONAL; INTRAMUSCULAR; SOFT TISSUE at 13:45

## 2018-06-21 NOTE — PROGRESS NOTES
Knee Exam      Patient: Robin Karimi    YOB: 1945 73 y.o. male    Chief Complaints: knee hurts    History of Present Illness:Patient had right knee arthroscopy on 3/20/18 with debridement of the medial meniscus and extensive abrasion chondroplasty of the knee and loose body removal from intercondylar notch.  He's also had his left knee scoped in 2015 and occasionally uses a brace for activity on it.    His last seen on 5/10/18 person occasional popping and cracking in the left knee and that his right knee was feeling better than it did before surgery with some occasional feelings of grinding with standing and twisting.  Instructions were given to continue with physical therapy and use of brace and activity modifications as needed.    He has completed therapy and still gets some moderate intermittent aching pain over the medial aspect of the right knee especially when he stands on just that leg when in the shower or when he was doing some kneeling.  He has not had any further mechanical symptoms symptoms to improved some with rest.  His left knee has begun to ache a bit more but nothing to the extent of the right knee.  No locking or catching to either knee.  HPI    ROS: knee pain  Past Medical History:   Diagnosis Date   • Asthma    • Diabetes mellitus    • Diverticulosis of colon    • GERD (gastroesophageal reflux disease)    • Hypertension    • IBS (irritable bowel syndrome)    • Infectious viral hepatitis     CHILDHOOD   • Joint pain    • Numbness or tingling     LEFT HAND   • Ringing in ears    • Sleep apnea    • Stiffness in joint    • Torn meniscus        Physical Exam:   There were no vitals filed for this visit.  Well developed with normal mood.Knee shows no warmth or erythema only trace effusion.  Moderate discomfort over the medial joint line but no focal pain over the medial or lateral tibial plateau.  No pain exacerbation with Melinda's.  Left knee shows mild discomfort over the medial  joint line but no focal pain with patellofemoral compression or with range of motion.      Radiology: None performed      Assessment/Plan: 1.  Right knee scope with debridement of meniscus and abrasion chondroplasty some exacerbation of arthritic change.  2.  status post left knee scope with chondromalacial changes    Right knee is bothering him more so than the left.  With exacerbation of chronic arthritic change.  We discussed treatment options and after verbal consent and sterile preparation with discussion of risks which can include infection as well as elevation of blood glucose, right knee was injected with steroid.    I've encouraged him to continue use of knee sleeves as needed and avoid kneeling squatting or twisting activity and continue with therapy exercises.  Thankfully her longer having the pain at night.    If symptoms persist or worsen he will let me know otherwise I will see him back as needed.  We had a pleasant visit and he told me how much they appreciate my care    Large Joint Arthrocentesis  Date/Time: 6/21/2018 1:45 PM  Consent given by: patient  Site marked: site marked  Timeout: Immediately prior to procedure a time out was called to verify the correct patient, procedure, equipment, support staff and site/side marked as required   Supporting Documentation  Indications: pain   Procedure Details  Location: knee - R knee  Preparation: Patient was prepped and draped in the usual sterile fashion  Needle size: 22 G  Approach: anteromedial  Medications administered: 80 mg methylPREDNISolone acetate 80 MG/ML; 4 mL lidocaine PF 1% 1 %  Patient tolerance: patient tolerated the procedure well with no immediate complications

## 2018-12-03 ENCOUNTER — OFFICE VISIT (OUTPATIENT)
Dept: ORTHOPEDIC SURGERY | Facility: CLINIC | Age: 73
End: 2018-12-03

## 2018-12-03 VITALS — BODY MASS INDEX: 29.12 KG/M2 | WEIGHT: 208 LBS | HEIGHT: 71 IN | TEMPERATURE: 97.9 F

## 2018-12-03 DIAGNOSIS — M17.0 ARTHRITIS OF BOTH KNEES: Primary | ICD-10-CM

## 2018-12-03 PROCEDURE — 99214 OFFICE O/P EST MOD 30 MIN: CPT | Performed by: ORTHOPAEDIC SURGERY

## 2018-12-03 NOTE — PROGRESS NOTES
Knee Exam      Patient: Robin Karimi    YOB: 1945 73 y.o. male    Chief Complaints: knees  hurt    History of Present Illness:Patient had right knee arthroscopy on 3/20/18 with debridement of the medial meniscus and extensive abrasion chondroplasty of the knee and loose body removal from intercondylar notch.  He's also had his left knee scoped in 2015 and occasionally uses a brace for activity on it.     He was seen on 5/10/18 person occasional popping and cracking in the left knee and that his right knee was feeling better than it did before surgery with some occasional feelings of grinding with standing and twisting.  Instructions were given to continue with physical therapy and use of brace and activity modifications as needed.     He was last seen on 6/21/18 and had completed therapy and still got some moderate intermittent aching pain over the medial aspect of the right knee especially when he stands on just that leg when in the shower or when he was doing some kneeling.  He had not had any further mechanical symptoms symptoms to improved some with rest.  His left knee has begun to ache a bit more but nothing to the extent of the right knee.  No locking or catching to either knee.    Since then his right knee has remained fairly bothersome and left knee has worsened about 2 months ago.  Increased aching with activity in the left knee with less mobility when he tries to cross his knee and then about 2 weeks ago he tripped and felt something pop in his left knee but actually this made it feel somewhat better still has moderate intermittent aching pain with less mobility in the left knee than the right.  HPI    ROS: knee pain, no numbness or tingling  Past Medical History:   Diagnosis Date   • Asthma    • Diabetes mellitus (CMS/HCC)    • Diverticulosis of colon    • GERD (gastroesophageal reflux disease)    • Hypertension    • IBS (irritable bowel syndrome)    • Infectious viral hepatitis      "CHILDHOOD   • Joint pain    • Numbness or tingling     LEFT HAND   • Ringing in ears    • Sleep apnea    • Stiffness in joint    • Torn meniscus        Physical Exam:   Vitals:    12/03/18 1404   Temp: 97.9 °F (36.6 °C)   Weight: 94.3 kg (208 lb)   Height: 180.3 cm (71\")     Well developed with normal mood.  Examination of left knee shows no warmth erythema there mild effusion.  0-115° range of motion with moderate discomfort over the medial more so than lateral joint line but no focal tenderness over the medial lateral femoral condyles or tibial plateau.  Mild discomfort with Melinda's but no jonathan locking or catching.  Grossly stable ligamentous exam.  There is pain with trying to cross his legs.        Right knee shows discomfort palpation of the medial joint line and mild effusion.      Radiology: 3 views of the the left knee as well as AP and sunrise view of the right knee were ordered today to evaluate pain and alignment reviewed and compared with previous x-rays.  His been progressive arthritic change of both knees right greater than left.      Assessment/Plan:  Bilateral knee arthritis right greater than left.  We discussed treatment options and after injection last time his blood sugars went up to 430 and he would like to avoid that.  I don't feel that further workup with MRI or knee arthroscopy would be of benefit to him given his symptoms and he would like to be considered for knee replacement possible doing both at the same time.    We'll set him up with one of my partners for evaluation for this in the interim he'll avoid kneeling squatting twisting activities and I will see him back as needed.    He and his wife told me how much they have always appreciated my care of them and their family  "

## 2018-12-07 ENCOUNTER — CONSULT (OUTPATIENT)
Dept: ORTHOPEDIC SURGERY | Facility: CLINIC | Age: 73
End: 2018-12-07

## 2018-12-07 VITALS — TEMPERATURE: 98.4 F | BODY MASS INDEX: 28.59 KG/M2 | HEIGHT: 71 IN | WEIGHT: 204.2 LBS

## 2018-12-07 DIAGNOSIS — M17.0 ARTHRITIS OF BOTH KNEES: Primary | ICD-10-CM

## 2018-12-07 DIAGNOSIS — M17.11 PRIMARY OSTEOARTHRITIS OF RIGHT KNEE: ICD-10-CM

## 2018-12-07 PROCEDURE — 73562 X-RAY EXAM OF KNEE 3: CPT | Performed by: ORTHOPAEDIC SURGERY

## 2018-12-07 PROCEDURE — 99214 OFFICE O/P EST MOD 30 MIN: CPT | Performed by: ORTHOPAEDIC SURGERY

## 2018-12-07 RX ORDER — MELOXICAM 15 MG/1
15 TABLET ORAL ONCE
Status: CANCELLED | OUTPATIENT
Start: 2019-02-12 | End: 2018-12-07

## 2018-12-07 RX ORDER — CEFAZOLIN SODIUM 2 G/100ML
2 INJECTION, SOLUTION INTRAVENOUS ONCE
Status: CANCELLED | OUTPATIENT
Start: 2019-02-12 | End: 2018-12-07

## 2018-12-07 RX ORDER — PREGABALIN 75 MG/1
150 CAPSULE ORAL ONCE
Status: CANCELLED | OUTPATIENT
Start: 2019-02-12 | End: 2018-12-07

## 2018-12-07 NOTE — PROGRESS NOTES
Patient: Robin Karimi  YOB: 1945 73 y.o. male  Medical Record Number: [id number]    Chief Complaints:   Chief Complaint   Patient presents with   • Left Knee - Establish Care, Pain   • Right Knee - Establish Care, Pain       History of Present Illness:Robin Karimi is a 73 y.o. male who presents with right greater than left knee pain he describes severe constant stabbing pain on the medial side of the joint.  Was sent by Dr. Roblero.  He has had both knees scoped in the past she's had injections and physical therapy and tried anti-inflammatories.  The steroid injections caused his blood sugars to spike to the 400s.  The pain now limits his basic activities of daily living and is severe and constant    Allergies:   Allergies   Allergen Reactions   • Zocor [Simvastatin] Other (See Comments)     MUSCLE PAIN       Medications:   Current Outpatient Medications   Medication Sig Dispense Refill   • acetaminophen (TYLENOL ARTHRITIS PAIN) 650 MG 8 hr tablet Take 650 mg by mouth Every 8 (Eight) Hours As Needed for Mild Pain .     • AMLODIPINE BESYLATE PO Take 10 mg by mouth 2 (two) times a day.     • aspirin 81 MG tablet Take 81 mg by mouth Daily. PT HOLDING FOR SURGERY     • cetirizine (ZyrTEC) 10 MG tablet Take 10 mg by mouth Daily As Needed.     • cholecalciferol (VITAMIN D3) 1000 units tablet Take 1,000 Units by mouth Daily.     • glipiZIDE (GLUCOTROL) 10 MG tablet Take 20 mg by mouth 2 (Two) Times a Day Before Meals.     • hydrochlorothiazide (HYDRODIURIL) 25 MG tablet Take 12.5 mg by mouth Every Morning. 1/2 tab 3 times a day      • Insulin Detemir (LEVEMIR FLEXPEN SC) Inject 34 Units under the skin Every Evening. 1 time at night      • Lactobacillus (PROBIOTIC ACIDOPHILUS) capsule Take 1 tablet/day by mouth Daily.     • loteprednol (LOTEMAX) 0.5 % ophthalmic suspension Administer 1 drop to both eyes Daily. 1 drop 3 times a day in left eye      • metFORMIN (GLUCOPHAGE) 500 MG tablet Take 500 mg by  "mouth 2 (Two) Times a Day With Meals.     • metoprolol tartrate (LOPRESSOR) 50 MG tablet Take 25 mg by mouth Every 12 (Twelve) Hours.     • nitroglycerin (NITROSTAT) 0.4 MG SL tablet Place 0.4 mg under the tongue Every 5 (Five) Minutes As Needed for Chest Pain. Take no more than 3 doses in 15 minutes.     • pantoprazole (PROTONIX) 40 MG EC tablet Take 40 mg by mouth Every Morning.  1   • rosuvastatin (CRESTOR) 20 MG tablet Take 10 mg by mouth Every Night.       No current facility-administered medications for this visit.          The following portions of the patient's history were reviewed and updated as appropriate: allergies, current medications, past family history, past medical history, past social history, past surgical history and problem list.    Review of Systems:   A 14 point review of systems was performed. All systems negative except pertinent positives/negative listed in HPI above    Physical Exam:   Vitals:    12/07/18 1421   Temp: 98.4 °F (36.9 °C)   TempSrc: Temporal   Weight: 92.6 kg (204 lb 3.2 oz)   Height: 180.3 cm (71\")       General: A and O x 3, ASA, NAD    SCLERA:    Normal    DENTITION:   Normal   Knee:  bilateral    ALIGNMENT:     Varus  ,   Patella  tracks  midline    GAIT:    Antalgic    SKIN:    No abnormality    RANGE OF MOTION:   5  -  115   DEG    STRENGTH:   4  / 5    LIGAMENTS:    No varus / valgus instability.   Negative  Lachman.    MENISCUS:     Negative   Melinda       DISTAL PULSES:    Paplable    DISTAL SENSATION :   Intact    LYMPHATICS:     No   lymphadenopathy    OTHER:          - Positive   effusion      - Crepitance with ROM         Radiology:  Xrays 3views both knees  (ap,lateral, sunrise) were ordered and reviewed for evaluation of knee pain demonstrating advanced varus osteoarthritis with bone on bone articulation, subchondral cysts, and periarticular osteophytes. In comparison to previous films there are no changes    Assessment/Plan: R > L  Advanced end stage OA. " Failed conseravtive measures.  Continuation of conservative management vs. TKA discussed.  The patient wishes to proceed with total knee replacement.  At this point the patient has failed the full compliment of conservative treatment and stating complete understanding of the risks/benefits/ anternatives wishes to proceed with surgical treatment.    Risk and benefits of surgery were reviewed.  Including, but not limited to, blood clots or pulmonary embolism, anesthesia risk, infection, fracture, skin/leg numbness, persistent pain/crepitance/popping/catching, failure of the implant, need for future surgeries, hematoma, possible nerve or blood vessel injury, need for transfusion, and potential risk of stroke,heart attack or death, among others.  The patient understands and wishes to proceed.     It was explained that if tissue has been repaired or reconstructed, there is also an increased chance of failure which may require further management.  Following the completion of the discussion, the patient expressed understanding of this planned course of care, all their questions were answered and consent will be obtained preoperatively.    Operative Plan: right [employer] Oxinium Total Knee Replacement an overnight staywith home health rehab  - will need lovenox and coumadin post op due to h/o factor V Leiden.        Seth Floyd MD  12/7/2018

## 2019-01-03 ENCOUNTER — OFFICE VISIT CONVERTED (OUTPATIENT)
Dept: ORTHOPEDIC SURGERY | Facility: CLINIC | Age: 74
End: 2019-01-03
Attending: ORTHOPAEDIC SURGERY

## 2019-01-04 PROBLEM — M17.11 PRIMARY OSTEOARTHRITIS OF RIGHT KNEE: Status: ACTIVE | Noted: 2019-01-04

## 2019-01-14 ENCOUNTER — HOSPITAL ENCOUNTER (OUTPATIENT)
Dept: LAB | Facility: HOSPITAL | Age: 74
Discharge: HOME | End: 2019-01-14

## 2019-01-14 LAB
ALBUMIN SERPL-MCNC: 4.5 G/DL (ref 3.5–5)
ALBUMIN/GLOB SERPL: 1.7 {RATIO} (ref 1.4–2.6)
ALP SERPL-CCNC: 52 U/L (ref 56–155)
ALT SERPL-CCNC: 24 U/L (ref 10–40)
ANION GAP SERPL CALC-SCNC: 16 MMOL/L (ref 8–19)
AST SERPL-CCNC: 17 U/L (ref 15–50)
BILIRUB SERPL-MCNC: 1.52 MG/DL (ref 0.2–1.3)
BUN SERPL-MCNC: 18 MG/DL (ref 5–25)
BUN/CREAT SERPL: 15 {RATIO} (ref 6–20)
CALCIUM SERPL-MCNC: 9.3 MG/DL (ref 8.7–10.4)
CHLORIDE SERPL-SCNC: 100 MMOL/L (ref 99–111)
CONV CO2: 30 MMOL/L (ref 22–32)
CONV CREATININE URINE, RANDOM: 140.8 MG/DL (ref 10–300)
CONV MICROALBUM.,U,RANDOM: 33.9 MG/L (ref 0–20)
CONV TOTAL PROTEIN: 7.2 G/DL (ref 6.3–8.2)
CREAT UR-MCNC: 1.23 MG/DL (ref 0.7–1.2)
EST. AVERAGE GLUCOSE BLD GHB EST-MCNC: 148 MG/DL
GFR SERPLBLD BASED ON 1.73 SQ M-ARVRAT: 58 ML/MIN/{1.73_M2}
GLOBULIN UR ELPH-MCNC: 2.7 G/DL (ref 2–3.5)
GLUCOSE SERPL-MCNC: 136 MG/DL (ref 70–99)
HBA1C MFR BLD: 6.8 % (ref 3.5–5.7)
MICROALBUMIN/CREAT UR: 24.1 MG/G{CRE} (ref 0–25)
OSMOLALITY SERPL CALC.SUM OF ELEC: 298 MOSM/KG (ref 273–304)
POTASSIUM SERPL-SCNC: 4.3 MMOL/L (ref 3.5–5.3)
SODIUM SERPL-SCNC: 142 MMOL/L (ref 135–147)

## 2019-01-15 ENCOUNTER — HOSPITAL ENCOUNTER (OUTPATIENT)
Dept: SLEEP MEDICINE | Facility: HOSPITAL | Age: 74
Discharge: HOME | End: 2019-01-15
Attending: PSYCHIATRY & NEUROLOGY

## 2019-01-31 ENCOUNTER — APPOINTMENT (OUTPATIENT)
Dept: PREADMISSION TESTING | Facility: HOSPITAL | Age: 74
End: 2019-01-31

## 2019-01-31 VITALS
BODY MASS INDEX: 28.28 KG/M2 | DIASTOLIC BLOOD PRESSURE: 73 MMHG | RESPIRATION RATE: 18 BRPM | HEART RATE: 58 BPM | HEIGHT: 71 IN | TEMPERATURE: 97.3 F | OXYGEN SATURATION: 98 % | SYSTOLIC BLOOD PRESSURE: 138 MMHG | WEIGHT: 202 LBS

## 2019-01-31 DIAGNOSIS — M17.11 PRIMARY OSTEOARTHRITIS OF RIGHT KNEE: ICD-10-CM

## 2019-01-31 LAB
ANION GAP SERPL CALCULATED.3IONS-SCNC: 12.7 MMOL/L
BILIRUB UR QL STRIP: NEGATIVE
BUN BLD-MCNC: 17 MG/DL (ref 8–23)
BUN/CREAT SERPL: 14.4 (ref 7–25)
CALCIUM SPEC-SCNC: 9.6 MG/DL (ref 8.6–10.5)
CHLORIDE SERPL-SCNC: 99 MMOL/L (ref 98–107)
CLARITY UR: CLEAR
CO2 SERPL-SCNC: 28.3 MMOL/L (ref 22–29)
COLOR UR: YELLOW
CREAT BLD-MCNC: 1.18 MG/DL (ref 0.76–1.27)
DEPRECATED RDW RBC AUTO: 43.5 FL (ref 37–54)
ERYTHROCYTE [DISTWIDTH] IN BLOOD BY AUTOMATED COUNT: 13.3 % (ref 11.5–14.5)
GFR SERPL CREATININE-BSD FRML MDRD: 61 ML/MIN/1.73
GLUCOSE BLD-MCNC: 119 MG/DL (ref 65–99)
GLUCOSE UR STRIP-MCNC: NEGATIVE MG/DL
HCT VFR BLD AUTO: 42.6 % (ref 40.4–52.2)
HGB BLD-MCNC: 13.7 G/DL (ref 13.7–17.6)
HGB UR QL STRIP.AUTO: NEGATIVE
KETONES UR QL STRIP: NEGATIVE
LEUKOCYTE ESTERASE UR QL STRIP.AUTO: NEGATIVE
MCH RBC QN AUTO: 28.7 PG (ref 27–32.7)
MCHC RBC AUTO-ENTMCNC: 32.2 G/DL (ref 32.6–36.4)
MCV RBC AUTO: 89.3 FL (ref 79.8–96.2)
NITRITE UR QL STRIP: NEGATIVE
PH UR STRIP.AUTO: 6.5 [PH] (ref 5–8)
PLATELET # BLD AUTO: 174 10*3/MM3 (ref 140–500)
PMV BLD AUTO: 12.7 FL (ref 6–12)
POTASSIUM BLD-SCNC: 3.9 MMOL/L (ref 3.5–5.2)
PROT UR QL STRIP: NEGATIVE
RBC # BLD AUTO: 4.77 10*6/MM3 (ref 4.6–6)
SODIUM BLD-SCNC: 140 MMOL/L (ref 136–145)
SP GR UR STRIP: 1.01 (ref 1–1.03)
UROBILINOGEN UR QL STRIP: NORMAL
WBC NRBC COR # BLD: 7.71 10*3/MM3 (ref 4.5–10.7)

## 2019-01-31 PROCEDURE — 81003 URINALYSIS AUTO W/O SCOPE: CPT | Performed by: ORTHOPAEDIC SURGERY

## 2019-01-31 PROCEDURE — 93005 ELECTROCARDIOGRAM TRACING: CPT

## 2019-01-31 PROCEDURE — 85027 COMPLETE CBC AUTOMATED: CPT | Performed by: ORTHOPAEDIC SURGERY

## 2019-01-31 PROCEDURE — 36415 COLL VENOUS BLD VENIPUNCTURE: CPT

## 2019-01-31 PROCEDURE — 93010 ELECTROCARDIOGRAM REPORT: CPT | Performed by: INTERNAL MEDICINE

## 2019-01-31 PROCEDURE — 80048 BASIC METABOLIC PNL TOTAL CA: CPT | Performed by: ORTHOPAEDIC SURGERY

## 2019-01-31 RX ORDER — AMLODIPINE BESYLATE 10 MG/1
10 TABLET ORAL DAILY
COMMUNITY
End: 2022-07-18 | Stop reason: SDUPTHER

## 2019-01-31 RX ORDER — LOSARTAN POTASSIUM AND HYDROCHLOROTHIAZIDE 12.5; 5 MG/1; MG/1
1 TABLET ORAL EVERY MORNING
COMMUNITY
End: 2022-07-18 | Stop reason: SDUPTHER

## 2019-01-31 RX ORDER — CHLORHEXIDINE GLUCONATE 500 MG/1
CLOTH TOPICAL TAKE AS DIRECTED
COMMUNITY
End: 2019-02-14 | Stop reason: HOSPADM

## 2019-01-31 ASSESSMENT — KOOS JR
KOOS JR SCORE: 36.931
KOOS JR SCORE: 20

## 2019-02-04 NOTE — PROGRESS NOTES
Pre-Operative Orthopedic Assessment      Patient: Robin Karimi    YOB: 1945      Age/Gender: 73 y.o. male  Medical Record Number: [id number]  Surgical Procedure Planned: TOTAL KNEE ARTHROPLASTY     Surgeon: Seth Floyd MD    Date of Surgery Planned:  2/12/19    Question Value Score    Patient Score   1. What is your age group? 50-65 years  66-75 years  >75 years =2  =1  =0 1   2. Gender? Male  Female =2  =1 2   3. How far on average can you walk? (a block is 200 meters) Two blocks or more (+\- rest)  1-2 blocks (+\- rest)  Housebound (most of time) =2  =1  =0 1   4. Which gait aid to you use? (more often than not) None  Single-Point Stick  Crutches/Frame =2  =1  =0 1   5. Do you use community  supports? (home help, meals on wheels, district nursing) None or one per week  Two or more per week =1  =0 1   6. Will you live with someone who can care for you after your operation? Yes  No =3  =0 3      Your Score (out of 12)  9     Key Destination at Discharge from acute care predicted by score   Scores < 6  -- extended inpatient rehabilitation   Scores 6-9 -- additional intervention to discharge directly home (Rehabilitation in home)   Scores > 9 -- directly home         Discharge Disposition/Planning:     Patient Response   Discussed the Predicted discharge disposition needed based on RAPT Assessment with the patient.    Y   Patient selected discharge disposition:   HOME   Out Patient Rehabilitation Facility of Choice:    [facility] [phone]   Post-Operative Care Giver Name: WIFE   ASHA MARSHALL# [phone]     Subacute Inpatient Rehabilitation:  Complete this section only if planning inpatient services at a Subacute Facility     Patient Response   Subacute Facility Preferred (Please list 2 facilities:      Requires pre-certification for inpatient rehabilitation services?         Planned source of transportation to inpatient rehabilitation  facility?       If choosing inpatient services at an Acute or Subacute Facility please list a subsequent back-up plan (in case patient fails to qualify for inpatient rehabilitation). Back-up plans should include caregiver (family member or friend) for first 24-48 post- -operatively.       Home Equipment Patient Response   Does patient have a walker for home use?    Y   Does patient have a 3 in 1 commode for home use?    N   Does patient have a shower chair for home use?    N   Does patient have an elevated commode seat for home use? N   Does patient have a cane for home use?    Y   Is there any other medical equipment in the home? If so,  List in comment section below N   Pre-Operative Class Attendance Patient Response   Attended or scheduled to attend the pre-operative class within 1 year of total joint replacement? Y   Patient Education  Completed   Expected time of discharge discussed Y   Y Y   Y Y   Patient receptive and voiced understanding of information given    Y                                                                                                            Comments:    FACE SHEET CORRECT.     1 STEP INTO HOUSE NO/RAIL AND NO STAIRS INSIDE.     HE HAS A CANE AND A WALKER WITH WHEELS.      HE PLANS TO USE [facility]  [facility] [employer] @ [phone] AND [facility] [employer] FOR OUTPT AS WELL.                                        Signature: Del Garcia LPN    Date:  2/4/2019

## 2019-02-07 ENCOUNTER — OFFICE VISIT (OUTPATIENT)
Dept: ORTHOPEDIC SURGERY | Facility: CLINIC | Age: 74
End: 2019-02-07

## 2019-02-07 VITALS — WEIGHT: 202 LBS | HEIGHT: 71 IN | BODY MASS INDEX: 28.28 KG/M2

## 2019-02-07 DIAGNOSIS — M17.11 PRIMARY OSTEOARTHRITIS OF RIGHT KNEE: Primary | ICD-10-CM

## 2019-02-07 DIAGNOSIS — M17.12 PRIMARY OSTEOARTHRITIS OF LEFT KNEE: ICD-10-CM

## 2019-02-07 PROCEDURE — 77077 JOINT SURVEY SINGLE VIEW: CPT | Performed by: ORTHOPAEDIC SURGERY

## 2019-02-07 PROCEDURE — S0260 H&P FOR SURGERY: HCPCS | Performed by: NURSE PRACTITIONER

## 2019-02-07 RX ORDER — CEFAZOLIN SODIUM 2 G/100ML
2 INJECTION, SOLUTION INTRAVENOUS ONCE
Status: CANCELLED | OUTPATIENT
Start: 2019-02-12 | End: 2019-02-07

## 2019-02-07 RX ORDER — MELOXICAM 15 MG/1
15 TABLET ORAL ONCE
Status: CANCELLED | OUTPATIENT
Start: 2019-02-12 | End: 2019-02-07

## 2019-02-07 RX ORDER — PREGABALIN 75 MG/1
150 CAPSULE ORAL ONCE
Status: CANCELLED | OUTPATIENT
Start: 2019-02-12 | End: 2019-02-07

## 2019-02-07 NOTE — H&P
Patient: Robin Karimi    Date of Admission: 2/12/19    YOB: 1945    Medical Record Number: [id number]    Admitting Physician: Dr. Seth Floyd    Reason for Admission: End Stage Left Knee OA    History of Present Illness: 73 y.o. male presents with severe end stage knee osteoarthritis which has not been responsive to the full compliment of conservative measures. Despite conservative attempts, there is still severe, constant activity limiting pain. Given the severity of the pain, the patient has elected to proceed with knee replacement.    Allergies:   Allergies   Allergen Reactions   • Benazepril-Hydrochlorothiazide Cough   • Zocor [Simvastatin] Other (See Comments)     MUSCLE PAIN         Current Medications:  Scheduled Meds:  PRN Meds:.    PMH:     Past Medical History:   Diagnosis Date   • Asthma    • Coronary artery disease    • Diabetes mellitus (CMS/HCC)    • Diverticulosis of colon    • Factor 5 Leiden mutation, heterozygous (CMS/HCC)    • GERD (gastroesophageal reflux disease)    • H/O cornea transplant    • Hip pain, chronic, left    • History of skin cancer    • History of stomach ulcers    • Hypertension    • IBS (irritable bowel syndrome)    • Infectious viral hepatitis     CHILDHOOD   • Joint pain    • Knee pain, bilateral    • Numbness or tingling     LEFT HAND   • Ringing in ears    • Sleep apnea     CPAP   • Stiffness in joint        PF/Surg/Soc Hx:     Past Surgical History:   Procedure Laterality Date   • CORNEAL TRANSPLANT      GARETT   • CORONARY ANGIOPLASTY WITH STENT PLACEMENT  2015   • KNEE ARTHROSCOPY Right 3/20/2018    Procedure: RIGHT KNEE ARTHROSCOPY, PARTIAL MEDIAL MENISECTOMY, ABRASSSION CHONDROPLASTY, AND LOOSE BODY REMOVAL;  Surgeon: Logan Roblero MD;  Location: [facility] OR [facility] [place];  Service: Orthopedics   • KNEE SURGERY  06/23/2015   • ROTATOR CUFF REPAIR Left         Social History     Occupational History   • Not on file   Tobacco Use   • Smoking status: Never  "Smoker   • Smokeless tobacco: Never Used   Substance and Sexual Activity   • Alcohol use: No   • Drug use: No   • Sexual activity: Defer      Social History     Social History Narrative   • Not on file        Family History   Problem Relation Age of Onset   • Stomach cancer Other    • Diabetes Other    • Heart disease Other    • Hypertension Other    • Malig Hyperthermia Neg Hx          Review of Systems:   A 14 point review of systems was performed, pertinent positives discussed above, all other systems are negative    Physical Exam: 73 y.o. male  Vital Signs :   Vitals:    02/07/19 1604   Weight: 91.6 kg (202 lb)   Height: 180.3 cm (71\")     General: Alert and Oriented x 3, No acute distress.  Psych: mood and affect appropriate; recent and remote memory intact  Eyes: conjunctiva clear; pupils equally round and reactive, sclera nonicteric  CV: no peripheral edema  Resp: normal respiratory effort  Skin: no rashes or wounds; normal turgor  Musculosketetal; pain and crepitance with knee range of motion  Vascular: palpable distal pulses    Xrays:  -3 views (AP, lateral, and sunrise) were reviewed demonstrating end-stage OA with bone on bone articulation.  -A full length AP xray was ordered today for purposes of operative alignment demonstrating end stage arthritic findings. There are no previous full length films for review    Assessment:  End-stage Left knee osteoarthritis. Conservative measures have failed.      Plan:  The plan is to proceed with Left Total Knee Replacement. The patient voiced understanding of the risks, benefits, and alternative forms of treatment that were discussed with Dr Floyd at the time of scheduling.  Patient is planning going home with home health next day.  He does have a history of DVT so we will use T acid locally in OR, prescribe Lovenox and Coumadin post operatively with venous Doppler bilateral lower extremities prior to discharge.  Also no steroids since patient has a history of " uncontrolled diabetes although his last hemoglobin A1c was 6.8%    Nel Oliveira, APRN  2/7/2019

## 2019-02-07 NOTE — H&P (VIEW-ONLY)
Patient: Robin Karimi    Date of Admission: 2/12/19    YOB: 1945    Medical Record Number: [id number]    Admitting Physician: Dr. Seth Floyd    Reason for Admission: End Stage Left Knee OA    History of Present Illness: 73 y.o. male presents with severe end stage knee osteoarthritis which has not been responsive to the full compliment of conservative measures. Despite conservative attempts, there is still severe, constant activity limiting pain. Given the severity of the pain, the patient has elected to proceed with knee replacement.    Allergies:   Allergies   Allergen Reactions   • Benazepril-Hydrochlorothiazide Cough   • Zocor [Simvastatin] Other (See Comments)     MUSCLE PAIN         Current Medications:  Scheduled Meds:  PRN Meds:.    PMH:     Past Medical History:   Diagnosis Date   • Asthma    • Coronary artery disease    • Diabetes mellitus (CMS/HCC)    • Diverticulosis of colon    • Factor 5 Leiden mutation, heterozygous (CMS/HCC)    • GERD (gastroesophageal reflux disease)    • H/O cornea transplant    • Hip pain, chronic, left    • History of skin cancer    • History of stomach ulcers    • Hypertension    • IBS (irritable bowel syndrome)    • Infectious viral hepatitis     CHILDHOOD   • Joint pain    • Knee pain, bilateral    • Numbness or tingling     LEFT HAND   • Ringing in ears    • Sleep apnea     CPAP   • Stiffness in joint        PF/Surg/Soc Hx:     Past Surgical History:   Procedure Laterality Date   • CORNEAL TRANSPLANT      GARETT   • CORONARY ANGIOPLASTY WITH STENT PLACEMENT  2015   • KNEE ARTHROSCOPY Right 3/20/2018    Procedure: RIGHT KNEE ARTHROSCOPY, PARTIAL MEDIAL MENISECTOMY, ABRASSSION CHONDROPLASTY, AND LOOSE BODY REMOVAL;  Surgeon: Logan Roblero MD;  Location: [facility] OR [facility] [place];  Service: Orthopedics   • KNEE SURGERY  06/23/2015   • ROTATOR CUFF REPAIR Left         Social History     Occupational History   • Not on file   Tobacco Use   • Smoking status: Never  "Smoker   • Smokeless tobacco: Never Used   Substance and Sexual Activity   • Alcohol use: No   • Drug use: No   • Sexual activity: Defer      Social History     Social History Narrative   • Not on file        Family History   Problem Relation Age of Onset   • Stomach cancer Other    • Diabetes Other    • Heart disease Other    • Hypertension Other    • Malig Hyperthermia Neg Hx          Review of Systems:   A 14 point review of systems was performed, pertinent positives discussed above, all other systems are negative    Physical Exam: 73 y.o. male  Vital Signs :   Vitals:    02/07/19 1604   Weight: 91.6 kg (202 lb)   Height: 180.3 cm (71\")     General: Alert and Oriented x 3, No acute distress.  Psych: mood and affect appropriate; recent and remote memory intact  Eyes: conjunctiva clear; pupils equally round and reactive, sclera nonicteric  CV: no peripheral edema  Resp: normal respiratory effort  Skin: no rashes or wounds; normal turgor  Musculosketetal; pain and crepitance with knee range of motion  Vascular: palpable distal pulses    Xrays:  -3 views (AP, lateral, and sunrise) were reviewed demonstrating end-stage OA with bone on bone articulation.  -A full length AP xray was ordered today for purposes of operative alignment demonstrating end stage arthritic findings. There are no previous full length films for review    Assessment:  End-stage Left knee osteoarthritis. Conservative measures have failed.      Plan:  The plan is to proceed with Left Total Knee Replacement. The patient voiced understanding of the risks, benefits, and alternative forms of treatment that were discussed with Dr Floyd at the time of scheduling.  Patient is planning going home with home health next day.  He does have a history of DVT so we will use T acid locally in OR, prescribe Lovenox and Coumadin post operatively with venous Doppler bilateral lower extremities prior to discharge.  Also no steroids since patient has a history of " uncontrolled diabetes although his last hemoglobin A1c was 6.8%    Nel Oliveira, APRN  2/7/2019

## 2019-02-12 ENCOUNTER — ANESTHESIA EVENT (OUTPATIENT)
Dept: PERIOP | Facility: HOSPITAL | Age: 74
End: 2019-02-12

## 2019-02-12 ENCOUNTER — HOSPITAL ENCOUNTER (OUTPATIENT)
Facility: HOSPITAL | Age: 74
Setting detail: OBSERVATION
Discharge: HOME HEALTH | End: 2019-02-14
Attending: ORTHOPAEDIC SURGERY | Admitting: ORTHOPAEDIC SURGERY

## 2019-02-12 ENCOUNTER — APPOINTMENT (OUTPATIENT)
Dept: GENERAL RADIOLOGY | Facility: HOSPITAL | Age: 74
End: 2019-02-12

## 2019-02-12 ENCOUNTER — ANESTHESIA (OUTPATIENT)
Dept: PERIOP | Facility: HOSPITAL | Age: 74
End: 2019-02-12

## 2019-02-12 DIAGNOSIS — M17.12 PRIMARY OSTEOARTHRITIS OF LEFT KNEE: ICD-10-CM

## 2019-02-12 DIAGNOSIS — M17.11 PRIMARY OSTEOARTHRITIS OF RIGHT KNEE: ICD-10-CM

## 2019-02-12 DIAGNOSIS — R26.2 DIFFICULTY WALKING: Primary | ICD-10-CM

## 2019-02-12 LAB
GLUCOSE BLDC GLUCOMTR-MCNC: 153 MG/DL (ref 70–130)
GLUCOSE BLDC GLUCOMTR-MCNC: 173 MG/DL (ref 70–130)
GLUCOSE BLDC GLUCOMTR-MCNC: 381 MG/DL (ref 70–130)
INR PPP: 1.15 (ref 0.9–1.1)
PROTHROMBIN TIME: 14.5 SECONDS (ref 11.7–14.2)

## 2019-02-12 PROCEDURE — 63710000001 INSULIN LISPRO (HUMAN) PER 5 UNITS: Performed by: INTERNAL MEDICINE

## 2019-02-12 PROCEDURE — G0378 HOSPITAL OBSERVATION PER HR: HCPCS

## 2019-02-12 PROCEDURE — 25010000002 MIDAZOLAM PER 1 MG: Performed by: ANESTHESIOLOGY

## 2019-02-12 PROCEDURE — 63710000001 INSULIN GLARGINE PER 5 UNITS: Performed by: INTERNAL MEDICINE

## 2019-02-12 PROCEDURE — 25010000003 CEFAZOLIN IN DEXTROSE 2-4 GM/100ML-% SOLUTION: Performed by: ORTHOPAEDIC SURGERY

## 2019-02-12 PROCEDURE — 85610 PROTHROMBIN TIME: CPT | Performed by: ORTHOPAEDIC SURGERY

## 2019-02-12 PROCEDURE — 25010000002 DEXAMETHASONE PER 1 MG: Performed by: ANESTHESIOLOGY

## 2019-02-12 PROCEDURE — C9290 INJ, BUPIVACAINE LIPOSOME: HCPCS | Performed by: ORTHOPAEDIC SURGERY

## 2019-02-12 PROCEDURE — C1776 JOINT DEVICE (IMPLANTABLE): HCPCS | Performed by: ORTHOPAEDIC SURGERY

## 2019-02-12 PROCEDURE — 25010000002 PROPOFOL 10 MG/ML EMULSION: Performed by: ANESTHESIOLOGY

## 2019-02-12 PROCEDURE — C1713 ANCHOR/SCREW BN/BN,TIS/BN: HCPCS | Performed by: ORTHOPAEDIC SURGERY

## 2019-02-12 PROCEDURE — 82962 GLUCOSE BLOOD TEST: CPT

## 2019-02-12 PROCEDURE — 25010000003 BUPIVACAINE LIPOSOME 1.3 % SUSPENSION 20 ML VIAL: Performed by: ORTHOPAEDIC SURGERY

## 2019-02-12 PROCEDURE — 25010000002 FENTANYL CITRATE (PF) 100 MCG/2ML SOLUTION: Performed by: ANESTHESIOLOGY

## 2019-02-12 PROCEDURE — 27447 TOTAL KNEE ARTHROPLASTY: CPT | Performed by: ORTHOPAEDIC SURGERY

## 2019-02-12 PROCEDURE — 73560 X-RAY EXAM OF KNEE 1 OR 2: CPT

## 2019-02-12 PROCEDURE — 25010000002 ONDANSETRON PER 1 MG: Performed by: ANESTHESIOLOGY

## 2019-02-12 DEVICE — GENESIS II CRUCIATE RETAINING DEEP                                    FLEXION INSERT SIZE5-6 9MM
Type: IMPLANTABLE DEVICE | Site: KNEE | Status: FUNCTIONAL
Brand: GENESIS II

## 2019-02-12 DEVICE — GENESIS II NON-POROUS TIBIAL                                    BASEPLATE SIZE 5 LEFT
Type: IMPLANTABLE DEVICE | Site: KNEE | Status: FUNCTIONAL
Brand: GENESIS II

## 2019-02-12 DEVICE — LEGION CRUCIATE RETAINING OXINIUM                                    FEMORAL SIZE 5 LEFT
Type: IMPLANTABLE DEVICE | Site: KNEE | Status: FUNCTIONAL
Brand: LEGION

## 2019-02-12 DEVICE — PALACOS® R IS A FAST-CURING, RADIOPAQUE, POLY(METHYL METHACRYLATE)-BASED BONE CEMENT.PALACOS ® R CONTAINS THE X-RAY CONTRAST MEDIUM ZIRCONIUM DIOXIDE. TO IMPROVE VISIBILITY IN THE SURGICAL FIELD PALACOS ® R HAS BEEN COLOURED WITH CHLOROPHYLL (E141). THE BONE CEMENT IS PREPARED DIRECTLY BEFORE USE BY MIXING A POLYMER POWDER COMPONENT WITH A LIQUID MONOMER COMPONENT. A DUCTILE DOUGH FORMS WHICH CURES WITHIN A FEW MINUTES.
Type: IMPLANTABLE DEVICE | Site: KNEE | Status: FUNCTIONAL
Brand: PALACOS®

## 2019-02-12 DEVICE — GENESIS II BICONVEX PATELLA 32MM
Type: IMPLANTABLE DEVICE | Site: KNEE | Status: FUNCTIONAL
Brand: GENESIS II

## 2019-02-12 DEVICE — IMPLANTABLE DEVICE: Type: IMPLANTABLE DEVICE | Site: KNEE | Status: FUNCTIONAL

## 2019-02-12 RX ORDER — EPHEDRINE SULFATE 50 MG/ML
5 INJECTION, SOLUTION INTRAVENOUS ONCE AS NEEDED
Status: DISCONTINUED | OUTPATIENT
Start: 2019-02-12 | End: 2019-02-12 | Stop reason: HOSPADM

## 2019-02-12 RX ORDER — PROPOFOL 10 MG/ML
VIAL (ML) INTRAVENOUS AS NEEDED
Status: DISCONTINUED | OUTPATIENT
Start: 2019-02-12 | End: 2019-02-12 | Stop reason: SURG

## 2019-02-12 RX ORDER — HYDROCODONE BITARTRATE AND ACETAMINOPHEN 7.5; 325 MG/1; MG/1
1 TABLET ORAL ONCE AS NEEDED
Status: DISCONTINUED | OUTPATIENT
Start: 2019-02-12 | End: 2019-02-12 | Stop reason: HOSPADM

## 2019-02-12 RX ORDER — HYDRALAZINE HYDROCHLORIDE 20 MG/ML
5 INJECTION INTRAMUSCULAR; INTRAVENOUS
Status: DISCONTINUED | OUTPATIENT
Start: 2019-02-12 | End: 2019-02-12 | Stop reason: HOSPADM

## 2019-02-12 RX ORDER — AMLODIPINE BESYLATE 5 MG/1
5 TABLET ORAL 2 TIMES DAILY
Status: DISCONTINUED | OUTPATIENT
Start: 2019-02-12 | End: 2019-02-14 | Stop reason: HOSPADM

## 2019-02-12 RX ORDER — UREA 10 %
3 LOTION (ML) TOPICAL NIGHTLY PRN
Status: DISCONTINUED | OUTPATIENT
Start: 2019-02-12 | End: 2019-02-14 | Stop reason: HOSPADM

## 2019-02-12 RX ORDER — ONDANSETRON 4 MG/1
4 TABLET, FILM COATED ORAL EVERY 6 HOURS PRN
Status: DISCONTINUED | OUTPATIENT
Start: 2019-02-12 | End: 2019-02-14 | Stop reason: HOSPADM

## 2019-02-12 RX ORDER — HYDROMORPHONE HYDROCHLORIDE 1 MG/ML
0.5 INJECTION, SOLUTION INTRAMUSCULAR; INTRAVENOUS; SUBCUTANEOUS
Status: DISCONTINUED | OUTPATIENT
Start: 2019-02-12 | End: 2019-02-12 | Stop reason: HOSPADM

## 2019-02-12 RX ORDER — PREGABALIN 75 MG/1
150 CAPSULE ORAL ONCE
Status: COMPLETED | OUTPATIENT
Start: 2019-02-12 | End: 2019-02-12

## 2019-02-12 RX ORDER — DIPHENHYDRAMINE HCL 25 MG
25 CAPSULE ORAL
Status: DISCONTINUED | OUTPATIENT
Start: 2019-02-12 | End: 2019-02-12 | Stop reason: HOSPADM

## 2019-02-12 RX ORDER — MAGNESIUM HYDROXIDE 1200 MG/15ML
LIQUID ORAL AS NEEDED
Status: DISCONTINUED | OUTPATIENT
Start: 2019-02-12 | End: 2019-02-12 | Stop reason: HOSPADM

## 2019-02-12 RX ORDER — SODIUM CHLORIDE 0.9 % (FLUSH) 0.9 %
1-10 SYRINGE (ML) INJECTION AS NEEDED
Status: DISCONTINUED | OUTPATIENT
Start: 2019-02-12 | End: 2019-02-12 | Stop reason: HOSPADM

## 2019-02-12 RX ORDER — NALOXONE HCL 0.4 MG/ML
0.2 VIAL (ML) INJECTION AS NEEDED
Status: DISCONTINUED | OUTPATIENT
Start: 2019-02-12 | End: 2019-02-12 | Stop reason: HOSPADM

## 2019-02-12 RX ORDER — FENTANYL CITRATE 50 UG/ML
50 INJECTION, SOLUTION INTRAMUSCULAR; INTRAVENOUS
Status: DISCONTINUED | OUTPATIENT
Start: 2019-02-12 | End: 2019-02-12 | Stop reason: HOSPADM

## 2019-02-12 RX ORDER — CEFAZOLIN SODIUM 2 G/100ML
2 INJECTION, SOLUTION INTRAVENOUS ONCE
Status: DISCONTINUED | OUTPATIENT
Start: 2019-02-12 | End: 2019-02-12 | Stop reason: HOSPADM

## 2019-02-12 RX ORDER — PROMETHAZINE HYDROCHLORIDE 25 MG/1
25 TABLET ORAL ONCE AS NEEDED
Status: DISCONTINUED | OUTPATIENT
Start: 2019-02-12 | End: 2019-02-12 | Stop reason: HOSPADM

## 2019-02-12 RX ORDER — MIDAZOLAM HYDROCHLORIDE 1 MG/ML
2 INJECTION INTRAMUSCULAR; INTRAVENOUS
Status: DISCONTINUED | OUTPATIENT
Start: 2019-02-12 | End: 2019-02-12 | Stop reason: HOSPADM

## 2019-02-12 RX ORDER — ONDANSETRON 2 MG/ML
INJECTION INTRAMUSCULAR; INTRAVENOUS AS NEEDED
Status: DISCONTINUED | OUTPATIENT
Start: 2019-02-12 | End: 2019-02-12 | Stop reason: SURG

## 2019-02-12 RX ORDER — MELOXICAM 15 MG/1
15 TABLET ORAL DAILY
Status: DISCONTINUED | OUTPATIENT
Start: 2019-02-12 | End: 2019-02-14 | Stop reason: HOSPADM

## 2019-02-12 RX ORDER — OXYCODONE AND ACETAMINOPHEN 7.5; 325 MG/1; MG/1
1 TABLET ORAL ONCE AS NEEDED
Status: COMPLETED | OUTPATIENT
Start: 2019-02-12 | End: 2019-02-12

## 2019-02-12 RX ORDER — DOCUSATE SODIUM 100 MG/1
100 CAPSULE, LIQUID FILLED ORAL 2 TIMES DAILY
Status: DISCONTINUED | OUTPATIENT
Start: 2019-02-12 | End: 2019-02-14 | Stop reason: HOSPADM

## 2019-02-12 RX ORDER — DIPHENHYDRAMINE HCL 25 MG
25 CAPSULE ORAL EVERY 6 HOURS PRN
Status: DISCONTINUED | OUTPATIENT
Start: 2019-02-12 | End: 2019-02-12 | Stop reason: HOSPADM

## 2019-02-12 RX ORDER — ONDANSETRON 4 MG/1
4 TABLET, ORALLY DISINTEGRATING ORAL EVERY 6 HOURS PRN
Status: DISCONTINUED | OUTPATIENT
Start: 2019-02-12 | End: 2019-02-14 | Stop reason: HOSPADM

## 2019-02-12 RX ORDER — HYDROCODONE BITARTRATE AND ACETAMINOPHEN 7.5; 325 MG/1; MG/1
2 TABLET ORAL EVERY 4 HOURS PRN
Status: DISCONTINUED | OUTPATIENT
Start: 2019-02-12 | End: 2019-02-14 | Stop reason: HOSPADM

## 2019-02-12 RX ORDER — SCOLOPAMINE TRANSDERMAL SYSTEM 1 MG/1
1 PATCH, EXTENDED RELEASE TRANSDERMAL
Status: DISCONTINUED | OUTPATIENT
Start: 2019-02-12 | End: 2019-02-12 | Stop reason: HOSPADM

## 2019-02-12 RX ORDER — WARFARIN SODIUM 6 MG/1
6 TABLET ORAL
Status: COMPLETED | OUTPATIENT
Start: 2019-02-12 | End: 2019-02-12

## 2019-02-12 RX ORDER — PROMETHAZINE HYDROCHLORIDE 25 MG/ML
12.5 INJECTION, SOLUTION INTRAMUSCULAR; INTRAVENOUS ONCE AS NEEDED
Status: DISCONTINUED | OUTPATIENT
Start: 2019-02-12 | End: 2019-02-12 | Stop reason: HOSPADM

## 2019-02-12 RX ORDER — PROPOFOL 10 MG/ML
VIAL (ML) INTRAVENOUS CONTINUOUS PRN
Status: DISCONTINUED | OUTPATIENT
Start: 2019-02-12 | End: 2019-02-12 | Stop reason: SURG

## 2019-02-12 RX ORDER — ACETAMINOPHEN 325 MG/1
650 TABLET ORAL ONCE AS NEEDED
Status: DISCONTINUED | OUTPATIENT
Start: 2019-02-12 | End: 2019-02-12 | Stop reason: HOSPADM

## 2019-02-12 RX ORDER — MELOXICAM 15 MG/1
15 TABLET ORAL ONCE
Status: DISCONTINUED | OUTPATIENT
Start: 2019-02-12 | End: 2019-02-12 | Stop reason: HOSPADM

## 2019-02-12 RX ORDER — LIDOCAINE HYDROCHLORIDE 10 MG/ML
0.5 INJECTION, SOLUTION EPIDURAL; INFILTRATION; INTRACAUDAL; PERINEURAL ONCE AS NEEDED
Status: DISCONTINUED | OUTPATIENT
Start: 2019-02-12 | End: 2019-02-12 | Stop reason: HOSPADM

## 2019-02-12 RX ORDER — ONDANSETRON 2 MG/ML
4 INJECTION INTRAMUSCULAR; INTRAVENOUS ONCE AS NEEDED
Status: DISCONTINUED | OUTPATIENT
Start: 2019-02-12 | End: 2019-02-12 | Stop reason: HOSPADM

## 2019-02-12 RX ORDER — PROMETHAZINE HYDROCHLORIDE 25 MG/1
25 SUPPOSITORY RECTAL ONCE AS NEEDED
Status: DISCONTINUED | OUTPATIENT
Start: 2019-02-12 | End: 2019-02-12 | Stop reason: HOSPADM

## 2019-02-12 RX ORDER — WARFARIN SODIUM 4 MG/1
4 TABLET ORAL
Status: DISCONTINUED | OUTPATIENT
Start: 2019-02-13 | End: 2019-02-14 | Stop reason: HOSPADM

## 2019-02-12 RX ORDER — NITROGLYCERIN 0.4 MG/1
0.4 TABLET SUBLINGUAL
Status: DISCONTINUED | OUTPATIENT
Start: 2019-02-12 | End: 2019-02-14 | Stop reason: HOSPADM

## 2019-02-12 RX ORDER — CEFAZOLIN SODIUM 2 G/100ML
2 INJECTION, SOLUTION INTRAVENOUS EVERY 8 HOURS
Status: COMPLETED | OUTPATIENT
Start: 2019-02-12 | End: 2019-02-13

## 2019-02-12 RX ORDER — NICOTINE POLACRILEX 4 MG
15 LOZENGE BUCCAL
Status: DISCONTINUED | OUTPATIENT
Start: 2019-02-12 | End: 2019-02-14 | Stop reason: HOSPADM

## 2019-02-12 RX ORDER — KETOROLAC TROMETHAMINE 30 MG/ML
30 INJECTION, SOLUTION INTRAMUSCULAR; INTRAVENOUS EVERY 8 HOURS
Status: DISPENSED | OUTPATIENT
Start: 2019-02-12 | End: 2019-02-14

## 2019-02-12 RX ORDER — BUPIVACAINE HYDROCHLORIDE 7.5 MG/ML
INJECTION, SOLUTION EPIDURAL; RETROBULBAR
Status: COMPLETED | OUTPATIENT
Start: 2019-02-12 | End: 2019-02-12

## 2019-02-12 RX ORDER — DEXAMETHASONE SODIUM PHOSPHATE 4 MG/ML
INJECTION, SOLUTION INTRA-ARTICULAR; INTRALESIONAL; INTRAMUSCULAR; INTRAVENOUS; SOFT TISSUE AS NEEDED
Status: DISCONTINUED | OUTPATIENT
Start: 2019-02-12 | End: 2019-02-12 | Stop reason: SURG

## 2019-02-12 RX ORDER — INSULIN GLARGINE 100 [IU]/ML
30 INJECTION, SOLUTION SUBCUTANEOUS NIGHTLY
Status: DISCONTINUED | OUTPATIENT
Start: 2019-02-12 | End: 2019-02-14 | Stop reason: HOSPADM

## 2019-02-12 RX ORDER — FENTANYL CITRATE 50 UG/ML
INJECTION, SOLUTION INTRAMUSCULAR; INTRAVENOUS
Status: COMPLETED | OUTPATIENT
Start: 2019-02-12 | End: 2019-02-12

## 2019-02-12 RX ORDER — MIDAZOLAM HYDROCHLORIDE 1 MG/ML
1 INJECTION INTRAMUSCULAR; INTRAVENOUS
Status: DISCONTINUED | OUTPATIENT
Start: 2019-02-12 | End: 2019-02-12 | Stop reason: HOSPADM

## 2019-02-12 RX ORDER — HYDROCODONE BITARTRATE AND ACETAMINOPHEN 7.5; 325 MG/1; MG/1
1 TABLET ORAL EVERY 4 HOURS PRN
Status: DISCONTINUED | OUTPATIENT
Start: 2019-02-12 | End: 2019-02-14 | Stop reason: HOSPADM

## 2019-02-12 RX ORDER — FAMOTIDINE 10 MG/ML
20 INJECTION, SOLUTION INTRAVENOUS ONCE
Status: COMPLETED | OUTPATIENT
Start: 2019-02-12 | End: 2019-02-12

## 2019-02-12 RX ORDER — ACETAMINOPHEN 325 MG/1
325 TABLET ORAL EVERY 4 HOURS PRN
Status: DISCONTINUED | OUTPATIENT
Start: 2019-02-12 | End: 2019-02-14 | Stop reason: HOSPADM

## 2019-02-12 RX ORDER — WOUND DRESSING ADHESIVE - LIQUID
LIQUID MISCELLANEOUS AS NEEDED
Status: DISCONTINUED | OUTPATIENT
Start: 2019-02-12 | End: 2019-02-12 | Stop reason: HOSPADM

## 2019-02-12 RX ORDER — FLUMAZENIL 0.1 MG/ML
0.2 INJECTION INTRAVENOUS AS NEEDED
Status: DISCONTINUED | OUTPATIENT
Start: 2019-02-12 | End: 2019-02-12 | Stop reason: HOSPADM

## 2019-02-12 RX ORDER — LABETALOL HYDROCHLORIDE 5 MG/ML
5 INJECTION, SOLUTION INTRAVENOUS
Status: DISCONTINUED | OUTPATIENT
Start: 2019-02-12 | End: 2019-02-12 | Stop reason: HOSPADM

## 2019-02-12 RX ORDER — ONDANSETRON 2 MG/ML
4 INJECTION INTRAMUSCULAR; INTRAVENOUS EVERY 6 HOURS PRN
Status: DISCONTINUED | OUTPATIENT
Start: 2019-02-12 | End: 2019-02-14 | Stop reason: HOSPADM

## 2019-02-12 RX ORDER — ACETAMINOPHEN 10 MG/ML
1000 INJECTION, SOLUTION INTRAVENOUS ONCE
Status: COMPLETED | OUTPATIENT
Start: 2019-02-12 | End: 2019-02-12

## 2019-02-12 RX ORDER — DEXTROSE MONOHYDRATE 25 G/50ML
25 INJECTION, SOLUTION INTRAVENOUS
Status: DISCONTINUED | OUTPATIENT
Start: 2019-02-12 | End: 2019-02-14 | Stop reason: HOSPADM

## 2019-02-12 RX ORDER — SODIUM CHLORIDE, SODIUM LACTATE, POTASSIUM CHLORIDE, CALCIUM CHLORIDE 600; 310; 30; 20 MG/100ML; MG/100ML; MG/100ML; MG/100ML
9 INJECTION, SOLUTION INTRAVENOUS CONTINUOUS
Status: DISCONTINUED | OUTPATIENT
Start: 2019-02-12 | End: 2019-02-12 | Stop reason: HOSPADM

## 2019-02-12 RX ORDER — PANTOPRAZOLE SODIUM 40 MG/1
40 TABLET, DELAYED RELEASE ORAL EVERY MORNING
Status: DISCONTINUED | OUTPATIENT
Start: 2019-02-13 | End: 2019-02-14 | Stop reason: HOSPADM

## 2019-02-12 RX ORDER — GLIPIZIDE 10 MG/1
10 TABLET ORAL
Status: DISCONTINUED | OUTPATIENT
Start: 2019-02-12 | End: 2019-02-14 | Stop reason: HOSPADM

## 2019-02-12 RX ADMIN — CEFAZOLIN SODIUM 2 G: 2 INJECTION, SOLUTION INTRAVENOUS at 23:16

## 2019-02-12 RX ADMIN — SODIUM CHLORIDE, POTASSIUM CHLORIDE, SODIUM LACTATE AND CALCIUM CHLORIDE 9 ML/HR: 600; 310; 30; 20 INJECTION, SOLUTION INTRAVENOUS at 10:22

## 2019-02-12 RX ADMIN — ONDANSETRON 4 MG: 2 INJECTION INTRAMUSCULAR; INTRAVENOUS at 14:52

## 2019-02-12 RX ADMIN — MIDAZOLAM 1 MG: 1 INJECTION INTRAMUSCULAR; INTRAVENOUS at 13:34

## 2019-02-12 RX ADMIN — DEXAMETHASONE SODIUM PHOSPHATE 8 MG: 4 INJECTION INTRA-ARTICULAR; INTRALESIONAL; INTRAMUSCULAR; INTRAVENOUS; SOFT TISSUE at 14:05

## 2019-02-12 RX ADMIN — PREGABALIN 150 MG: 75 CAPSULE ORAL at 10:23

## 2019-02-12 RX ADMIN — BUPIVACAINE HYDROCHLORIDE 2 ML: 7.5 INJECTION, SOLUTION EPIDURAL; RETROBULBAR at 14:06

## 2019-02-12 RX ADMIN — SODIUM CHLORIDE, POTASSIUM CHLORIDE, SODIUM LACTATE AND CALCIUM CHLORIDE: 600; 310; 30; 20 INJECTION, SOLUTION INTRAVENOUS at 13:51

## 2019-02-12 RX ADMIN — PROPOFOL 50 MCG/KG/MIN: 10 INJECTION, EMULSION INTRAVENOUS at 14:05

## 2019-02-12 RX ADMIN — METOPROLOL TARTRATE 25 MG: 25 TABLET ORAL at 20:56

## 2019-02-12 RX ADMIN — INSULIN GLARGINE 30 UNITS: 100 INJECTION, SOLUTION SUBCUTANEOUS at 22:20

## 2019-02-12 RX ADMIN — WARFARIN SODIUM 6 MG: 6 TABLET ORAL at 20:56

## 2019-02-12 RX ADMIN — FENTANYL CITRATE 20 MCG: 50 INJECTION INTRAMUSCULAR; INTRAVENOUS at 14:06

## 2019-02-12 RX ADMIN — MIDAZOLAM 1 MG: 1 INJECTION INTRAMUSCULAR; INTRAVENOUS at 10:44

## 2019-02-12 RX ADMIN — INSULIN LISPRO 6 UNITS: 100 INJECTION, SOLUTION INTRAVENOUS; SUBCUTANEOUS at 22:23

## 2019-02-12 RX ADMIN — AMLODIPINE BESYLATE 5 MG: 5 TABLET ORAL at 23:14

## 2019-02-12 RX ADMIN — SCOPALAMINE 1 PATCH: 1 PATCH, EXTENDED RELEASE TRANSDERMAL at 10:44

## 2019-02-12 RX ADMIN — FAMOTIDINE 20 MG: 10 INJECTION, SOLUTION INTRAVENOUS at 10:44

## 2019-02-12 RX ADMIN — PROPOFOL 40 MG: 10 INJECTION, EMULSION INTRAVENOUS at 14:05

## 2019-02-12 RX ADMIN — ACETAMINOPHEN 1000 MG: 10 INJECTION, SOLUTION INTRAVENOUS at 14:05

## 2019-02-12 RX ADMIN — MUPIROCIN 10 APPLICATION: 20 OINTMENT TOPICAL at 20:56

## 2019-02-12 RX ADMIN — OXYCODONE HYDROCHLORIDE AND ACETAMINOPHEN 1 TABLET: 7.5; 325 TABLET ORAL at 16:15

## 2019-02-12 RX ADMIN — HYDROCODONE BITARTRATE AND ACETAMINOPHEN 2 TABLET: 7.5; 325 TABLET ORAL at 20:41

## 2019-02-12 NOTE — ANESTHESIA PROCEDURE NOTES
Spinal Block    Pre-sedation assessment completed: 2/12/2019 1:56 PM    Patient reassessed immediately prior to procedure    Patient location during procedure: OB  Start Time: 2/12/2019 1:56 PM  Stop Time: 2/12/2019 2:00 PM  Indication:at surgeon's request  Performed By  Anesthesiologist: Tushar Saucedo MD  Preanesthetic Checklist  Completed: patient identified, site marked, surgical consent, pre-op evaluation, timeout performed, IV checked, risks and benefits discussed and monitors and equipment checked  Spinal Block Prep:  Patient Position:sitting  Sterile Tech:cap, gloves, mask and sterile barriers  Prep:Chloraprep  Patient Monitoring:blood pressure monitoring, continuous pulse oximetry and EKG  Spinal Block Procedure  Approach:midline  Guidance:landmark technique and palpation technique  Location:L4-L5  Needle Type:Sprotte  Needle Gauge:24  Placement of Spinal needle event:cerebrospinal fluid aspirated  Paresthesia: no  Fluid Appearance:clear  Medications: fentaNYL citrate (PF) (SUBLIMAZE) injection, 20 mcg  bupivacaine PF (MARCAINE) 0.75 % injection, 2 mL   Post Assessment  Patient Tolerance:patient tolerated the procedure well with no apparent complications  Complications no  Additional Notes  Pt. To OB OR for procedure.  Sitting on side of bed, NIMP, 2L O2 [place] NC, SAB placed as per note above.  Pt. Placed supine with left uterine displacement.

## 2019-02-12 NOTE — ANESTHESIA PREPROCEDURE EVALUATION
Anesthesia Evaluation     Patient summary reviewed and Nursing notes reviewed   history of anesthetic complications: PONV               Airway   Mallampati: II  Neck ROM: limited  Dental      Pulmonary    (+) asthma, sleep apnea,   Cardiovascular     ECG reviewed  Rhythm: regular  Rate: normal    (+) hypertension, CAD,       Neuro/Psych  (+) numbness,     GI/Hepatic/Renal/Endo    (+)  GERD,  hepatitis A, liver disease, diabetes mellitus type 2 well controlled,     Musculoskeletal     Abdominal    Substance History - negative use     OB/GYN negative ob/gyn ROS         Other   (+) arthritis                   Anesthesia Plan    ASA 3     spinal   (SAB with sedation (propofol))  intravenous induction   Anesthetic plan, all risks, benefits, and alternatives have been provided, discussed and informed consent has been obtained with: patient.

## 2019-02-12 NOTE — ANESTHESIA POSTPROCEDURE EVALUATION
Patient: Robin Karimi    Procedure Summary     Date:  02/12/19 Room / Location:  [facility] OR [address] [place] [address] MAIN OR    Anesthesia Start:  1351 Anesthesia Stop:  1540    Procedure:  TOTAL KNEE ARTHROPLASTY (Left Knee) Diagnosis:       Primary osteoarthritis of left knee      (Primary osteoarthritis of left knee [M17.12])    Surgeon:  Seth Floyd MD Provider:  Tushar Saucedo MD    Anesthesia Type:  spinal ASA Status:  3          Anesthesia Type: spinal  Last vitals  BP   130/68 (02/12/19 1545)   Temp   36.6 °C (97.9 °F) (02/12/19 0942)   Pulse   50 (02/12/19 1545)   Resp   16 (02/12/19 1545)     SpO2   90 % (02/12/19 1545)     Post Anesthesia Care and Evaluation    Patient location during evaluation: PACU  Patient participation: complete - patient participated  Level of consciousness: awake and alert  Pain management: adequate  Airway patency: patent  Anesthetic complications: No anesthetic complications    Cardiovascular status: acceptable  Respiratory status: acceptable  Hydration status: acceptable    Comments: --------------------            02/12/19 1545     --------------------   BP:       130/68     Pulse:      50       Resp:       16       Temp:                SpO2:      90%      --------------------

## 2019-02-12 NOTE — OP NOTE
Name: Robin Karimi  YOB: 1945    DATE OF SURGERY: 2/12/2019    PREOPERATIVE DIAGNOSIS: Left knee end-stage osteoarthritis    POSTOPERATIVE DIAGNOSIS: Left knee end-stage osteoarthritis    PROCEDURE PERFORMED: Left total knee replacement    SURGEON: Seth Floyd M.D.    ASSISTANT: GLENNY BOND    IMPLANTS: [employer]:     Implant Name Type Inv. Item Serial No.  Lot No. LRB No. Used   CMT BONE PALACOS R HI/VISC 1X40 - [id number] Implant CMT BONE PALACOS R HI/VISC 1X40  [facility] [id number] Left 1   CMT BONE PALACOS R HI/VISC 1X40 - [id number] Implant CMT BONE PALACOS R HI/VISC 1X40  [facility] [id number] Left 1   BASE TIB/KN GEN2 NONPOR TI SZ5 LT - [id number] Implant BASE TIB/KN GEN2 NONPOR TI SZ5 LT  [employer] [id number] Left 1   COMP FEM LEGION OXINIUM CR SZ5 LT - [id number] Implant COMP FEM LEGION OXINIUM CR SZ5 LT  [employer] [id number] Left 1   PAT GEN2 BICONVEX [id number] - [id number] Implant PAT GEN2 [employer]EX [id number]  [employer] [id number] Left 1   INSRT KN CR FLX DEEP GEN2 SZ5 6 9MM - [id number] Implant INSRT KN CR FLX DEEP GEN2 SZ5 6 9MM  [employer] [id number] Left 1       Estimated Blood Loss: 200cc  Specimens : none  Complications: none    DESCRIPTION OF PROCEDURE: The patient was taken to the operating room and placed in the supine position. A sequential compression device was carefully placed on the non-operative leg. Preoperative antibiotics were administered. Surgical time out was performed. After adequate induction of anesthesia, the leg was prepped and draped in the usual sterile fashion, exsanguinated with an Esmarch bandage and the tourniquet inflated to 250 mmHg. A midline incision was performed followed by a medial parapatellar arthrotomy. The patella was subluxed laterally.  A portion of the fat pad, ACL, and anterior horns of the meniscus were excised. The drill hole was placed in the distal femur and the canal was the  irrigated and suctioned. The IM ligia was placed and a 5 degree distal valgus cut was performed on the femur. The femur was then sized with a sizing guide. The femoral cutting block was placed and all femoral cuts were performed. The proximal tibia was exposed. We used the extramedullary tibial cutting guide set for removal of 9mm of bone off the high side. The tibial cut was performed. The posterior horns of the menisci were excised. The posterior osteophytes were removed. Flexion extension blocks were then used to balance the knee. The tibial cut surface was then sized with the sizing templates and the tibial and femoral trial were then placed. The knee was placed in full extension and then the tibial tray rotation was then matched to the femoral rotation and marked.    Attention was then placed to the patella. The patella was noted to track centrally through range of motion. The patella was then sized with the trials. The thickness of the patella was then measured. The patella was resurfaced and the surrounding osteophytes were removed. The preoperative thickness was reproduced. The patella tracked centrally through range of motion.   At this point all trial components were removed, the knee was copiously irrigated with pulsed lavage, and the knee was injected with anesthetic cocktail solution. The cut surfaces were then dried with clean lap sponges, and the components were cemented tibia, followed by femur, then patella. The knee was held in full extension and all excess cement was removed. The knee was held still until the cement had completely hardened. We then placed the trial polyethylene spacer which resulted in full extension and excellent flexion-extension balance. We placed the final polyethylene spacer.   The knee was then copiously irrigated. The tourniquet was then released. There was excellent hemostasis. We placed a one-eighth inch Hemovac drain. We closed the knee in multiple layers in standard  fashion. Sterile dressing were applied. At the end of the case, the sponge and needle counts were reported as being correct. There were no known complications. The patient was then transported to the recovery room.      Seth Floyd M.D.

## 2019-02-12 NOTE — PLAN OF CARE
Problem: Patient Care Overview  Goal: Plan of Care Review  Outcome: Ongoing (interventions implemented as appropriate)   02/12/19 1634 02/12/19 0296   Plan of Care Review   Progress --  improving   OTHER   Outcome Summary --  Pt same day post op of L TKA. Pain well controlled d/t spinal and local block. Pt DTV at 23:40. VSS. Educated on BG monitoring d/t hx of DM. Will cont to monitor.    Coping/Psychosocial   Plan of Care Reviewed With patient --      Goal: Individualization and Mutuality  Outcome: Ongoing (interventions implemented as appropriate)    Goal: Discharge Needs Assessment  Outcome: Ongoing (interventions implemented as appropriate)    Goal: Interprofessional Rounds/Family Conf  Outcome: Ongoing (interventions implemented as appropriate)      Problem: Fall Risk (Adult)  Goal: Identify Related Risk Factors and Signs and Symptoms  Outcome: Ongoing (interventions implemented as appropriate)    Goal: Absence of Fall  Outcome: Ongoing (interventions implemented as appropriate)      Problem: Knee Arthroplasty (Total, Partial) (Adult)  Goal: Signs and Symptoms of Listed Potential Problems Will be Absent, Minimized or Managed (Knee Arthroplasty)  Outcome: Outcome(s) achieved Date Met: 02/12/19    Goal: Anesthesia/Sedation Recovery  Outcome: Ongoing (interventions implemented as appropriate)      Problem: Diabetes, Type 2 (Adult)  Goal: Signs and Symptoms of Listed Potential Problems Will be Absent, Minimized or Managed (Diabetes, Type 2)  Outcome: Ongoing (interventions implemented as appropriate)

## 2019-02-13 ENCOUNTER — APPOINTMENT (OUTPATIENT)
Dept: CARDIOLOGY | Facility: HOSPITAL | Age: 74
End: 2019-02-13

## 2019-02-13 PROBLEM — E11.9 DIABETES MELLITUS TYPE 2, CONTROLLED: Chronic | Status: ACTIVE | Noted: 2019-02-13

## 2019-02-13 PROBLEM — G47.30 SLEEP APNEA: Status: ACTIVE | Noted: 2019-02-13

## 2019-02-13 PROBLEM — I10 HYPERTENSION: Chronic | Status: ACTIVE | Noted: 2019-02-13

## 2019-02-13 PROBLEM — D68.51 FACTOR 5 LEIDEN MUTATION, HETEROZYGOUS: Chronic | Status: ACTIVE | Noted: 2019-02-13

## 2019-02-13 LAB
ALBUMIN SERPL-MCNC: 3.9 G/DL (ref 3.5–5.2)
ALBUMIN/GLOB SERPL: 1.4 G/DL
ALP SERPL-CCNC: 53 U/L (ref 39–117)
ALT SERPL W P-5'-P-CCNC: 44 U/L (ref 1–41)
ANION GAP SERPL CALCULATED.3IONS-SCNC: 12.1 MMOL/L
AST SERPL-CCNC: 61 U/L (ref 1–40)
BH CV LOWER VASCULAR LEFT COMMON FEMORAL AUGMENT: NORMAL
BH CV LOWER VASCULAR LEFT COMMON FEMORAL COMPETENT: NORMAL
BH CV LOWER VASCULAR LEFT COMMON FEMORAL COMPRESS: NORMAL
BH CV LOWER VASCULAR LEFT COMMON FEMORAL PHASIC: NORMAL
BH CV LOWER VASCULAR LEFT COMMON FEMORAL SPONT: NORMAL
BH CV LOWER VASCULAR LEFT DISTAL FEMORAL COMPRESS: NORMAL
BH CV LOWER VASCULAR LEFT GASTRONEMIUS COMPRESS: NORMAL
BH CV LOWER VASCULAR LEFT GREATER SAPH AK COMPRESS: NORMAL
BH CV LOWER VASCULAR LEFT GREATER SAPH BK COMPRESS: NORMAL
BH CV LOWER VASCULAR LEFT MID FEMORAL AUGMENT: NORMAL
BH CV LOWER VASCULAR LEFT MID FEMORAL COMPETENT: NORMAL
BH CV LOWER VASCULAR LEFT MID FEMORAL COMPRESS: NORMAL
BH CV LOWER VASCULAR LEFT MID FEMORAL PHASIC: NORMAL
BH CV LOWER VASCULAR LEFT MID FEMORAL SPONT: NORMAL
BH CV LOWER VASCULAR LEFT PERONEAL COMPRESS: NORMAL
BH CV LOWER VASCULAR LEFT POPLITEAL AUGMENT: NORMAL
BH CV LOWER VASCULAR LEFT POPLITEAL COMPETENT: NORMAL
BH CV LOWER VASCULAR LEFT POPLITEAL COMPRESS: NORMAL
BH CV LOWER VASCULAR LEFT POPLITEAL PHASIC: NORMAL
BH CV LOWER VASCULAR LEFT POPLITEAL SPONT: NORMAL
BH CV LOWER VASCULAR LEFT POSTERIOR TIBIAL COMPRESS: NORMAL
BH CV LOWER VASCULAR LEFT PROXIMAL FEMORAL COMPRESS: NORMAL
BH CV LOWER VASCULAR LEFT SAPHENOFEMORAL JUNCTION AUGMENT: NORMAL
BH CV LOWER VASCULAR LEFT SAPHENOFEMORAL JUNCTION COMPETENT: NORMAL
BH CV LOWER VASCULAR LEFT SAPHENOFEMORAL JUNCTION COMPRESS: NORMAL
BH CV LOWER VASCULAR LEFT SAPHENOFEMORAL JUNCTION PHASIC: NORMAL
BH CV LOWER VASCULAR LEFT SAPHENOFEMORAL JUNCTION SPONT: NORMAL
BH CV LOWER VASCULAR RIGHT COMMON FEMORAL AUGMENT: NORMAL
BH CV LOWER VASCULAR RIGHT COMMON FEMORAL COMPETENT: NORMAL
BH CV LOWER VASCULAR RIGHT COMMON FEMORAL COMPRESS: NORMAL
BH CV LOWER VASCULAR RIGHT COMMON FEMORAL PHASIC: NORMAL
BH CV LOWER VASCULAR RIGHT COMMON FEMORAL SPONT: NORMAL
BH CV LOWER VASCULAR RIGHT DISTAL FEMORAL COMPRESS: NORMAL
BH CV LOWER VASCULAR RIGHT GASTRONEMIUS COMPRESS: NORMAL
BH CV LOWER VASCULAR RIGHT GREATER SAPH AK COMPRESS: NORMAL
BH CV LOWER VASCULAR RIGHT GREATER SAPH BK COMPRESS: NORMAL
BH CV LOWER VASCULAR RIGHT MID FEMORAL AUGMENT: NORMAL
BH CV LOWER VASCULAR RIGHT MID FEMORAL COMPETENT: NORMAL
BH CV LOWER VASCULAR RIGHT MID FEMORAL COMPRESS: NORMAL
BH CV LOWER VASCULAR RIGHT MID FEMORAL PHASIC: NORMAL
BH CV LOWER VASCULAR RIGHT MID FEMORAL SPONT: NORMAL
BH CV LOWER VASCULAR RIGHT PERONEAL COMPRESS: NORMAL
BH CV LOWER VASCULAR RIGHT POPLITEAL AUGMENT: NORMAL
BH CV LOWER VASCULAR RIGHT POPLITEAL COMPETENT: NORMAL
BH CV LOWER VASCULAR RIGHT POPLITEAL COMPRESS: NORMAL
BH CV LOWER VASCULAR RIGHT POPLITEAL PHASIC: NORMAL
BH CV LOWER VASCULAR RIGHT POPLITEAL SPONT: NORMAL
BH CV LOWER VASCULAR RIGHT POSTERIOR TIBIAL COMPRESS: NORMAL
BH CV LOWER VASCULAR RIGHT PROXIMAL FEMORAL COMPRESS: NORMAL
BH CV LOWER VASCULAR RIGHT SAPHENOFEMORAL JUNCTION AUGMENT: NORMAL
BH CV LOWER VASCULAR RIGHT SAPHENOFEMORAL JUNCTION COMPETENT: NORMAL
BH CV LOWER VASCULAR RIGHT SAPHENOFEMORAL JUNCTION COMPRESS: NORMAL
BH CV LOWER VASCULAR RIGHT SAPHENOFEMORAL JUNCTION PHASIC: NORMAL
BH CV LOWER VASCULAR RIGHT SAPHENOFEMORAL JUNCTION SPONT: NORMAL
BILIRUB SERPL-MCNC: 1.4 MG/DL (ref 0.1–1.2)
BUN BLD-MCNC: 21 MG/DL (ref 8–23)
BUN/CREAT SERPL: 16.8 (ref 7–25)
CALCIUM SPEC-SCNC: 9 MG/DL (ref 8.6–10.5)
CHLORIDE SERPL-SCNC: 97 MMOL/L (ref 98–107)
CO2 SERPL-SCNC: 25.9 MMOL/L (ref 22–29)
CREAT BLD-MCNC: 1.25 MG/DL (ref 0.76–1.27)
GFR SERPL CREATININE-BSD FRML MDRD: 57 ML/MIN/1.73
GLOBULIN UR ELPH-MCNC: 2.7 GM/DL
GLUCOSE BLD-MCNC: 308 MG/DL (ref 65–99)
GLUCOSE BLDC GLUCOMTR-MCNC: 164 MG/DL (ref 70–130)
GLUCOSE BLDC GLUCOMTR-MCNC: 172 MG/DL (ref 70–130)
GLUCOSE BLDC GLUCOMTR-MCNC: 222 MG/DL (ref 70–130)
GLUCOSE BLDC GLUCOMTR-MCNC: 270 MG/DL (ref 70–130)
HBA1C MFR BLD: 6.7 % (ref 4.8–5.6)
HCT VFR BLD AUTO: 39.1 % (ref 37.5–51)
HGB BLD-MCNC: 13.1 G/DL (ref 13–17.7)
INR PPP: 1.15 (ref 0.9–1.1)
POTASSIUM BLD-SCNC: 4.3 MMOL/L (ref 3.5–5.2)
PROT SERPL-MCNC: 6.6 G/DL (ref 6–8.5)
PROTHROMBIN TIME: 14.5 SECONDS (ref 11.7–14.2)
SODIUM BLD-SCNC: 135 MMOL/L (ref 136–145)

## 2019-02-13 PROCEDURE — 85610 PROTHROMBIN TIME: CPT | Performed by: ORTHOPAEDIC SURGERY

## 2019-02-13 PROCEDURE — 85018 HEMOGLOBIN: CPT | Performed by: ORTHOPAEDIC SURGERY

## 2019-02-13 PROCEDURE — 80053 COMPREHEN METABOLIC PANEL: CPT | Performed by: INTERNAL MEDICINE

## 2019-02-13 PROCEDURE — 97110 THERAPEUTIC EXERCISES: CPT

## 2019-02-13 PROCEDURE — 63710000001 INSULIN GLARGINE PER 5 UNITS: Performed by: INTERNAL MEDICINE

## 2019-02-13 PROCEDURE — 93970 EXTREMITY STUDY: CPT

## 2019-02-13 PROCEDURE — 97150 GROUP THERAPEUTIC PROCEDURES: CPT

## 2019-02-13 PROCEDURE — 63710000001 INSULIN LISPRO (HUMAN) PER 5 UNITS: Performed by: INTERNAL MEDICINE

## 2019-02-13 PROCEDURE — 25010000002 ENOXAPARIN PER 10 MG: Performed by: ORTHOPAEDIC SURGERY

## 2019-02-13 PROCEDURE — G0378 HOSPITAL OBSERVATION PER HR: HCPCS

## 2019-02-13 PROCEDURE — 97162 PT EVAL MOD COMPLEX 30 MIN: CPT

## 2019-02-13 PROCEDURE — 85014 HEMATOCRIT: CPT | Performed by: ORTHOPAEDIC SURGERY

## 2019-02-13 PROCEDURE — 82962 GLUCOSE BLOOD TEST: CPT

## 2019-02-13 PROCEDURE — 83036 HEMOGLOBIN GLYCOSYLATED A1C: CPT | Performed by: INTERNAL MEDICINE

## 2019-02-13 PROCEDURE — 25010000003 CEFAZOLIN IN DEXTROSE 2-4 GM/100ML-% SOLUTION: Performed by: ORTHOPAEDIC SURGERY

## 2019-02-13 RX ORDER — LOTEPREDNOL ETABONATE 5 MG/ML
1 SUSPENSION/ DROPS OPHTHALMIC DAILY
Status: DISCONTINUED | OUTPATIENT
Start: 2019-02-13 | End: 2019-02-14 | Stop reason: HOSPADM

## 2019-02-13 RX ORDER — LOTEPREDNOL ETABONATE 5 MG/ML
1 SUSPENSION/ DROPS OPHTHALMIC DAILY
Status: DISCONTINUED | OUTPATIENT
Start: 2019-02-13 | End: 2019-02-13

## 2019-02-13 RX ADMIN — ENOXAPARIN SODIUM 30 MG: 30 INJECTION SUBCUTANEOUS at 21:38

## 2019-02-13 RX ADMIN — INSULIN LISPRO 2 UNITS: 100 INJECTION, SOLUTION INTRAVENOUS; SUBCUTANEOUS at 17:22

## 2019-02-13 RX ADMIN — POLYETHYLENE GLYCOL 3350 17 G: 17 POWDER, FOR SOLUTION ORAL at 20:32

## 2019-02-13 RX ADMIN — WARFARIN SODIUM 4 MG: 4 TABLET ORAL at 17:22

## 2019-02-13 RX ADMIN — LOSARTAN POTASSIUM: 50 TABLET, FILM COATED ORAL at 08:43

## 2019-02-13 RX ADMIN — INSULIN GLARGINE 30 UNITS: 100 INJECTION, SOLUTION SUBCUTANEOUS at 21:33

## 2019-02-13 RX ADMIN — POLYETHYLENE GLYCOL 3350 17 G: 17 POWDER, FOR SOLUTION ORAL at 08:43

## 2019-02-13 RX ADMIN — INSULIN LISPRO 3 UNITS: 100 INJECTION, SOLUTION INTRAVENOUS; SUBCUTANEOUS at 21:33

## 2019-02-13 RX ADMIN — ENOXAPARIN SODIUM 30 MG: 30 INJECTION SUBCUTANEOUS at 13:14

## 2019-02-13 RX ADMIN — AMLODIPINE BESYLATE 5 MG: 5 TABLET ORAL at 08:43

## 2019-02-13 RX ADMIN — HYDROCODONE BITARTRATE AND ACETAMINOPHEN 2 TABLET: 7.5; 325 TABLET ORAL at 05:28

## 2019-02-13 RX ADMIN — HYDROCODONE BITARTRATE AND ACETAMINOPHEN 2 TABLET: 7.5; 325 TABLET ORAL at 13:15

## 2019-02-13 RX ADMIN — METOPROLOL TARTRATE 25 MG: 25 TABLET ORAL at 20:32

## 2019-02-13 RX ADMIN — PANTOPRAZOLE SODIUM 40 MG: 40 TABLET, DELAYED RELEASE ORAL at 06:20

## 2019-02-13 RX ADMIN — GLIPIZIDE 10 MG: 10 TABLET ORAL at 08:42

## 2019-02-13 RX ADMIN — MUPIROCIN 10 APPLICATION: 20 OINTMENT TOPICAL at 08:43

## 2019-02-13 RX ADMIN — AMLODIPINE BESYLATE 5 MG: 5 TABLET ORAL at 20:32

## 2019-02-13 RX ADMIN — HYDROCODONE BITARTRATE AND ACETAMINOPHEN 2 TABLET: 7.5; 325 TABLET ORAL at 09:03

## 2019-02-13 RX ADMIN — INSULIN LISPRO 4 UNITS: 100 INJECTION, SOLUTION INTRAVENOUS; SUBCUTANEOUS at 08:43

## 2019-02-13 RX ADMIN — HYDROCODONE BITARTRATE AND ACETAMINOPHEN 2 TABLET: 7.5; 325 TABLET ORAL at 01:02

## 2019-02-13 RX ADMIN — GLIPIZIDE 10 MG: 10 TABLET ORAL at 17:22

## 2019-02-13 RX ADMIN — ENOXAPARIN SODIUM 30 MG: 30 INJECTION SUBCUTANEOUS at 01:02

## 2019-02-13 RX ADMIN — CEFAZOLIN SODIUM 2 G: 2 INJECTION, SOLUTION INTRAVENOUS at 06:20

## 2019-02-13 RX ADMIN — INSULIN LISPRO 2 UNITS: 100 INJECTION, SOLUTION INTRAVENOUS; SUBCUTANEOUS at 13:15

## 2019-02-13 RX ADMIN — MELOXICAM 15 MG: 15 TABLET ORAL at 08:42

## 2019-02-13 RX ADMIN — METOPROLOL TARTRATE 25 MG: 25 TABLET ORAL at 08:42

## 2019-02-13 RX ADMIN — DOCUSATE SODIUM 100 MG: 100 CAPSULE, LIQUID FILLED ORAL at 08:43

## 2019-02-13 RX ADMIN — HYDROCODONE BITARTRATE AND ACETAMINOPHEN 2 TABLET: 7.5; 325 TABLET ORAL at 21:33

## 2019-02-13 RX ADMIN — DOCUSATE SODIUM 100 MG: 100 CAPSULE, LIQUID FILLED ORAL at 20:32

## 2019-02-13 RX ADMIN — HYDROCODONE BITARTRATE AND ACETAMINOPHEN 2 TABLET: 7.5; 325 TABLET ORAL at 17:26

## 2019-02-13 NOTE — PROGRESS NOTES
Orthopedic Total Knee Progress Note      Patient: Robin Karimi  Date of Admission: 2/12/2019  YOB: 1945  Medical Record Number: [id number]    POD # : 1 Day Post-Op Procedure(s) (LRB):  TOTAL KNEE ARTHROPLASTY (Left)    Systemic or Specific Complaints: No Complaints    Pain Relief: complete resolution    Physical Exam:  73 y.o.  male  Vitals:  Temp:  [97.3 °F (36.3 °C)-98 °F (36.7 °C)] 97.5 °F (36.4 °C)  Heart Rate:  [45-75] 69  Resp:  [14-18] 16  BP: (113-143)/(62-79) 113/62  alert and oriented  Chest: Clear to auscultation  CV: Regular Rate and Rhythm  Abd: Soft, NT, with BS +  Ext: NV intact. ROM appropriate. Calf is soft and nontender. Negative Homans Sn  Skin: Incision clean dry and intact w/out signs or  symptoms of infection.    Activity: Mobilizing Per P.T.   Weight Bearing: As Tolerated    Data Review     Admission on 02/12/2019   Component Date Value Ref Range Status   • Glucose 02/12/2019 173* 70 - 130 mg/dL Final   • Glucose 02/12/2019 153* 70 - 130 mg/dL Final   • Protime 02/12/2019 14.5* 11.7 - 14.2 Seconds Final   • INR 02/12/2019 1.15* 0.90 - 1.10 Final   • Glucose 02/12/2019 381* 70 - 130 mg/dL Final   • Hemoglobin 02/13/2019 13.1  13.0 - 17.7 g/dL Final   • Hematocrit 02/13/2019 39.1  37.5 - 51.0 % Final   • Protime 02/13/2019 14.5* 11.7 - 14.2 Seconds Final   • INR 02/13/2019 1.15* 0.90 - 1.10 Final   • Glucose 02/13/2019 308* 65 - 99 mg/dL Final   • BUN 02/13/2019 21  8 - 23 mg/dL Final   • Creatinine 02/13/2019 1.25  0.76 - 1.27 mg/dL Final   • Sodium 02/13/2019 135* 136 - 145 mmol/L Final   • Potassium 02/13/2019 4.3  3.5 - 5.2 mmol/L Final   • Chloride 02/13/2019 97* 98 - 107 mmol/L Final   • CO2 02/13/2019 25.9  22.0 - 29.0 mmol/L Final   • Calcium 02/13/2019 9.0  8.6 - 10.5 mg/dL Final   • Total Protein 02/13/2019 6.6  6.0 - 8.5 g/dL Final   • Albumin 02/13/2019 3.90  3.50 - 5.20 g/dL Final   • ALT (SGPT) 02/13/2019 44* 1 - 41 U/L Final   • AST (SGOT) 02/13/2019 61* 1 -  40 U/L Final   • Alkaline Phosphatase 02/13/2019 53  39 - 117 U/L Final   • Total Bilirubin 02/13/2019 1.4* 0.1 - 1.2 mg/dL Final   • eGFR Non African Amer 02/13/2019 57* >60 mL/min/1.73 Final   • Globulin 02/13/2019 2.7  gm/dL Final   • A/G Ratio 02/13/2019 1.4  g/dL Final   • BUN/Creatinine Ratio 02/13/2019 16.8  7.0 - 25.0 Final   • Anion Gap 02/13/2019 12.1  mmol/L Final   • Hemoglobin A1C 02/13/2019 6.70* 4.80 - 5.60 % Final   • Glucose 02/13/2019 270* 70 - 130 mg/dL Final       No results found.    Medications    amLODIPine 5 mg Oral BID   docusate sodium 100 mg Oral BID   enoxaparin 30 mg Subcutaneous Q12H   glipiZIDE 10 mg Oral BID AC   insulin glargine 30 Units Subcutaneous Nightly   insulin lispro 0-7 Units Subcutaneous 4x Daily With Meals & Nightly   ketorolac 30 mg Intravenous Q8H   losartan-HCTZ (HYZAAR) 50-12.5 combo dose  Oral Daily   meloxicam 15 mg Oral Daily   metoprolol tartrate 25 mg Oral Q12H   mupirocin  Each Nare BID   pantoprazole 40 mg Oral QAM   polyethylene glycol 17 g Oral BID   warfarin 4 mg Oral Daily     •  acetaminophen  •  dextrose  •  dextrose  •  glucagon (human recombinant)  •  HYDROcodone-acetaminophen  •  HYDROcodone-acetaminophen  •  melatonin  •  nitroglycerin  •  ondansetron **OR** ondansetron ODT **OR** ondansetron    Assessment:  Doing well POD  # 1 Day Post-Op following total knee replacement  Problem List Items Addressed This Visit        Musculoskeletal and Integument    Primary osteoarthritis of left knee    Relevant Medications    pregabalin (LYRICA) capsule 150 mg (Completed)          Plan:   Continue efforts to mobilize  Continue Pain Control Measures  Continue incisional Care  DVT prophylaxis  Needs better Bld Glc control -  LHA managing    Discharge Plan:Home tomorrow     Seth Floyd MD    Date: 2/13/2019  Time: 6:57 AM

## 2019-02-13 NOTE — CONSULTS
"Diabetes Education  Assessment/Teaching    Patient Name:  Robin Karimi  YOB: 1945  MRN: [id number]  Admit Date:  2/12/2019      Assessment Date:  2/13/2019    Most Recent Value   General Information    Referral From:  MD lubin   Height  180.3 cm (71\")   Height Method  Stated   Weight  91.3 kg (201 lb 5 oz)   Weight Method  Standing scale   Pregnancy Assessment   Diabetes History   What type of diabetes do you have?  Type 2   Length of Diabetes Diagnosis  10 + years [Pt states 15 years]   Current DM knowledge  good   Do you test your blood sugar at home?  yes   Frequency of checks  TID   Who performs the test?  self   Typical readings  100-120s   Have you had low blood sugar? (<70mg/dl)  yes   How often do you have low blood sugar?  occasionally [Pt states three times in a month with reading in the 60s]   Education Preferences   Barriers to Learning  other (comment) [None noted]   Nutrition Information   Assessment Topics   Problem Solving - Assessment  Needs education   DM Goals   Healthy Eating - Goal  0-7 days from discharge   Problem Solving - Goal  0-30 days from discharge   Contact Plan  Follow-up medical care            Most Recent Value   DM Education Needs   Meter  Has own   Medication  Oral, Actions [Discussed actions of glucotrol as suspect agent is the possible cause of hypo prior to dinner.  ]   Problem Solving  -- [Encouraged pt to eat every 4-6 hours and snack between lunch/dinner to prevent hypoglycemia.  ]   Reducing Risks  A1C testing [A1c 6.7%]   Healthy Coping  Appropriate   Discharge Plan  Home, Follow-up with endocrinolgoist [Encouraged pt to discuss hypoglycemic events w/ Endo to evaluate medications/dosages.  ]   Motivation  Moderate   Teaching Method  Explanation, Discussion   Patient Response  Verbalized understanding            Other Comments:          Electronically signed by:  eKntrell Thomas RN  02/13/19 4:13 PM  "

## 2019-02-13 NOTE — PLAN OF CARE
Problem: Patient Care Overview  Goal: Plan of Care Review  Outcome: Ongoing (interventions implemented as appropriate)   02/13/19 0337   OTHER   Outcome Summary some incontinence with spinal, has resolved, VSS, up to chair, po pain meds effective, LHA following for diabetes. anticipating staying another night. educated on monitoring blood sugars   Coping/Psychosocial   Plan of Care Reviewed With patient     Goal: Individualization and Mutuality  Outcome: Ongoing (interventions implemented as appropriate)    Goal: Discharge Needs Assessment  Outcome: Ongoing (interventions implemented as appropriate)      Problem: Fall Risk (Adult)  Goal: Absence of Fall  Outcome: Ongoing (interventions implemented as appropriate)      Problem: Knee Arthroplasty (Total, Partial) (Adult)  Goal: Anesthesia/Sedation Recovery  Outcome: Ongoing (interventions implemented as appropriate)

## 2019-02-13 NOTE — THERAPY EVALUATION
Acute Care - Physical Therapy Initial Evaluation  [facility]     Patient Name: Robin Karimi  : 1945  MRN: [id number]  Today's Date: 2019   Onset of Illness/Injury or Date of Surgery: 19     Referring Physician: Everett      Admit Date: 2019    Visit Dx:     ICD-10-CM ICD-9-CM   1. Difficulty walking R26.2 719.7   2. Primary osteoarthritis of left knee M17.12 715.16     Patient Active Problem List   Diagnosis   • Complete tear of left rotator cuff   • Impingement syndrome, shoulder   • Primary localized osteoarthrosis of right lower leg   • Tear of medial meniscus of right knee, current   • Chondromalacia of right knee   • Tear of medial meniscus of right knee   • Chondromalacia, left knee   • Arthritis of both knees   • Primary osteoarthritis of right knee   • Primary osteoarthritis of left knee   • Sleep apnea- CPAP   • Hypertension   • Factor 5 Leiden mutation, heterozygous   • Diabetes mellitus type 2, controlled      Past Medical History:   Diagnosis Date   • Asthma    • Coronary artery disease    • Diabetes mellitus (CMS/HCC)    • Diverticulosis of colon    • Factor 5 Leiden mutation, heterozygous (CMS/HCC)    • GERD (gastroesophageal reflux disease)    • H/O cornea transplant    • Hip pain, chronic, left    • History of skin cancer    • History of stomach ulcers    • Hypertension    • IBS (irritable bowel syndrome)    • Infectious viral hepatitis     CHILDHOOD   • Joint pain    • Knee pain, bilateral    • Numbness or tingling     LEFT HAND   • PONV (postoperative nausea and vomiting)    • Ringing in ears    • Sleep apnea     CPAP   • Stiffness in joint      Past Surgical History:   Procedure Laterality Date   • CHOLECYSTECTOMY     • COLONOSCOPY     • CORNEAL TRANSPLANT      GARETT   • CORONARY ANGIOPLASTY      and    • KNEE ARTHROSCOPY Right 3/20/2018    Procedure: RIGHT KNEE ARTHROSCOPY, PARTIAL MEDIAL MENISECTOMY, ABRASSSION CHONDROPLASTY, AND LOOSE BODY REMOVAL;  Surgeon:  Logan Roblero MD;  [place] [facility] [place] [facility] [place]: [place]opedics   • KNEE SURGERY  06/23/2015   • ROTATOR CUFF REPAIR Left    • TOTAL KNEE ARTHROPLASTY Left 2/12/2019    Procedure: TOTAL KNEE ARTHROPLASTY;  Surgeon: Seth Floyd MD;  Location: [facility] MAIN OR;  Service: Orthopedics        PT ASSESSMENT (last 12 hours)      Physical Therapy Evaluation     Row Name 02/13/19 1038          PT Evaluation Time/Intention    Subjective Information  complains of;pain  -EM     Document Type  evaluation  -EM     Mode of Treatment  physical therapy  -EM     Patient Effort  good  -EM     Row Name 02/13/19 1038          General Information    Onset of Illness/Injury or Date of Surgery  02/12/19  -EM     Referring Physician  Everett  -EM     Patient Observations  alert;cooperative;agree to therapy  -EM     General Observations of Patient  elderly  male sitting up in bed, awake and alert   -EM     Prior Level of Function  independent:;community mobility  -EM     Equipment Currently Used at Home  none  -EM     Pertinent History of Current Functional Problem  L TKA   -EM     Row Name 02/13/19 1038          Relationship/Environment    Lives With  spouse  -EM     Row Name 02/13/19 1038          Resource/Environmental Concerns    Current Living Arrangements  home/apartment/condo  -EM     Row Name 02/13/19 1038          Cognitive Assessment/Intervention- PT/OT    Orientation Status (Cognition)  oriented x 4  -EM     Follows Commands (Cognition)  WNL  -EM     Row Name 02/13/19 1038          Mobility Assessment/Treatment    Extremity Weight-bearing Status  left lower extremity  -EM     Left Lower Extremity (Weight-bearing Status)  weight-bearing as tolerated (WBAT)  -EM     Row Name 02/13/19 1038          Bed Mobility Assessment/Treatment    Bed Mobility Assessment/Treatment  supine-sit;sit-supine  -EM     Supine-Sit Giles (Bed Mobility)  conditional independence  -EM     Assistive Device (Bed Mobility)  head of  bed elevated  -EM     [facility] Name 02/13/19 1038          Transfer Assessment/Treatment    Transfer Assessment/Treatment  sit-stand transfer;stand-sit transfer  -EM     Sit-Stand [place] (Transfers)  contact guard  -EM     Stand-Sit [place] (Transfers)  contact guard  -EM     [facility] Name 02/13/19 1038          Sit-Stand Transfer    Assistive Device (Sit-Stand Transfers)  walker, front-wheeled  -EM     [facility] Name 02/13/19 1038          Stand-Sit Transfer    Assistive Device (Stand-Sit Transfers)  walker, front-wheeled  -EM     [facility] Name 02/13/19 1038          Gait/Stairs Assessment/Training    [place] Level (Gait)  contact guard  -EM     Assistive Device (Gait)  walker, front-wheeled  -EM     Distance in Feet (Gait)  75  -EM     Deviations/Abnormal Patterns (Gait)  antalgic;gait speed decreased  -EM     Bilateral Gait Deviations  forward flexed posture  -[facility] Name 02/13/19 1038          General ROM    GENERAL ROM COMMENTS  ROM WNL ( R knee 0-90)   -EM     Row Name 02/13/19 1038          MMT (Manual Muscle Testing)    General MMT Comments  no focal deficits identified, general post op weakness in LLE   -EM     Row Name 02/13/19 1038          Motor Assessment/Intervention    Additional Documentation  Therapeutic Exercise (Group);Therapeutic Exercise Interventions (Group)  -EM     Row Name 02/13/19 1038          Therapeutic Exercise    Comment (Therapeutic Exercise)  10 reps TKA protocol   -EM     Row Name 02/13/19 1038          Pain Assessment    Additional Documentation  Pain Scale: Numbers Pre/Post-Treatment (Group)  -EM     Row Name 02/13/19 1038          Pain Scale: Numbers Pre/Post-Treatment    Pain Scale: Numbers, Pretreatment  4/10  -EM     Pain Location - Side  Left  -EM     Pain Location  knee  -EM     Pain Intervention(s)  Medication (See MAR)  -EM     Row Name             Wound 02/12/19 1435 Left knee incision    Wound - Properties Group Date first assessed: 02/12/19  -EG Time first assessed: 1435   -EG Side: Left  -EG Location: knee  -EG Type: incision  -EG    Row Name 02/13/19 1038          Plan of Care Review    Plan of Care Reviewed With  patient  -EM     Row Name 02/13/19 1038          Physical Therapy Clinical Impression    Patient/Family Goals Statement (PT Clinical Impression)  go home, get back to independence  -EM     Criteria for Skilled Interventions Met (PT Clinical Impression)  yes;treatment indicated  -EM     Impairments Found (describe specific impairments)  gait, locomotion, and balance;ROM;joint integrity and mobility  -EM     Rehab Potential (PT Clinical Summary)  good, to achieve stated therapy goals  -EM     Row Name 02/13/19 1038          Physical Therapy Goals    Transfer Goal Selection (PT)  transfer, PT goal 1  -EM     Gait Training Goal Selection (PT)  gait training, PT goal 1  -EM     ROM Goal Selection (PT)  ROM, PT goal 1  -EM     Additional Documentation  ROM Goal Selection (PT) (Row)  -EM     Row Name 02/13/19 1038          Transfer Goal 1 (PT)    Activity/Assistive Device (Transfer Goal 1, PT)  sit-to-stand/stand-to-sit;walker, rolling  -EM     [place] Level/Cues Needed (Transfer Goal 1, PT)  conditional independence  -EM     Time Frame (Transfer Goal 1, PT)  3 days  -EM     Row Name 02/13/19 1038          Gait Training Goal 1 (PT)    Activity/Assistive Device (Gait Training Goal 1, PT)  walker, rolling  -EM     [place] Level (Gait Training Goal 1, PT)  conditional independence  -EM     Distance (Gait Goal 1, PT)  200  -EM     Time Frame (Gait Training Goal 1, PT)  3 days  -EM     Row Name 02/13/19 1038          ROM Goal 1 (PT)    ROM Goal 1 (PT)  L knee 0-100  -EM     Time Frame (ROM Goal 1, PT)  3 days  -EM     Row Name 02/13/19 1038          Patient Education Goal (PT)    Activity (Patient Education Goal, PT)  patietn able to perform exercises per TKA protocol   -EM     [place]/Cues/Accuracy (Memory Goal 2, PT)  demonstrates adequately;independent  -EM     Time  Frame (Patient Education Goal, PT)  3 days  -EM     Row Name 02/13/19 1038          Positioning and Restraints    Pre-Treatment Position  in bed  -EM     Post Treatment Position  chair  -EM     In Chair  reclined;call light within reach  -EM     Row Name 02/13/19 1038          Living Environment    Home Accessibility  -- no stairs to enter home, steps to basement   -EM       User Key  (r) = Recorded By, (t) = Taken By, (c) = Cosigned By    Initials Name Provider Type    EM Tana Ortega PT Physical Therapist    Tana Álvarez RN Registered Nurse        Physical Therapy Education     Title: PT OT SLP Therapies (In Progress)     Topic: Physical Therapy (In Progress)     Point: Mobility training (In Progress)     Learning Progress Summary           Patient Acceptance, E, NR by EM at 2/13/2019 10:47 AM                   Point: Home exercise program (In Progress)     Learning Progress Summary           Patient Acceptance, E, NR by EM at 2/13/2019 10:47 AM                               User Key     Initials Effective Dates Name Provider Type Discipline    EM 04/03/18 -  Tana Ortega PT Physical Therapist PT              PT Recommendation and Plan  Anticipated Discharge Disposition (PT): home with home health  Planned Therapy Interventions (PT Eval): gait training, home exercise program, transfer training  Therapy Frequency (PT Clinical Impression): 2 times/day  Outcome Summary/Treatment Plan (PT)  Anticipated Discharge Disposition (PT): home with home health  Plan of Care Reviewed With: patient  Outcome Summary: patient presents s/p L TKA with impairments consisting of decreased ROM L knee, generalized post op weakness and pain and patient would benefit from skilled PT. Patient lives with wife, independent with mobility prior to sx, no stairs to enter home. Horacio reports he has necessary DME at home, anticipate d/c home with  tomorrow   Outcome Measures     Row Name 02/13/19 1000              How much help from another person do you currently need...    Turning from your back to your side while in flat bed without using bedrails?  4  -EM      Moving from lying on back to sitting on the side of a flat bed without bedrails?  4  -EM      Moving to and from a bed to a chair (including a wheelchair)?  3  -EM      Standing up from a chair using your arms (e.g., wheelchair, bedside chair)?  3  -EM      Climbing 3-5 steps with a railing?  3  -EM      To walk in hospital room?  3  -EM      AM-PAC 6 Clicks Score  20  -EM         Functional Assessment    Outcome Measure Options  AM-PAC 6 Clicks Basic Mobility (PT)  -EM        User Key  (r) = Recorded By, (t) = Taken By, (c) = Cosigned By    Initials Name Provider Type    Tana George PT Physical Therapist         Time Calculation:   PT Charges     Row Name 02/13/19 1049             Time Calculation    Start Time  0935  -EM      Stop Time  0957  -EM      Time Calculation (min)  22 min  -EM      PT Received On  02/13/19  -EM      PT - Next Appointment  02/14/19  -EM      PT Goal Re-Cert Due Date  02/16/19  -EM         Time Calculation- PT    Total Timed Code Minutes- PT  13 minute(s)  -EM        User Key  (r) = Recorded By, (t) = Taken By, (c) = Cosigned By    Initials Name Provider Type    Tana George PT Physical Therapist        Therapy Suggested Charges     Code   Minutes Charges    None           Therapy Charges for Today     Code Description Service Date Service Provider Modifiers Qty    87769407728 HC PT EVAL MOD COMPLEXITY 2 2/13/2019 Tana Ortega, PT GP 1    [id number]  PT THER PROC EA 15 MIN 2/13/2019 Tana Ortega PT GP 1          PT G-Codes  Outcome Measure Options: AM-PAC 6 Clicks Basic Mobility (PT)  AM-PAC 6 Clicks Score: 20      Tana Ortega PT  2/13/2019

## 2019-02-13 NOTE — THERAPY TREATMENT NOTE
Acute Care - Physical Therapy Treatment Note  [facility]     Patient Name: Robin Karimi  : 1945  MRN: [id number]  Today's Date: 2019  Onset of Illness/Injury or Date of Surgery: 19     Referring Physician: [place]    Admit Date: 2019    Visit Dx:    ICD-10-CM ICD-9-CM   1. Difficulty walking R26.2 719.7   2. Primary osteoarthritis of left knee M17.12 715.16     Patient Active Problem List   Diagnosis   • Complete tear of left rotator cuff   • Impingement syndrome, shoulder   • Primary localized osteoarthrosis of right lower leg   • Tear of medial meniscus of right knee, current   • Chondromalacia of right knee   • Tear of medial meniscus of right knee   • Chondromalacia, left knee   • Arthritis of both knees   • Primary osteoarthritis of right knee   • Primary osteoarthritis of left knee   • Sleep apnea- CPAP   • Hypertension   • Factor 5 Leiden mutation, heterozygous   • Diabetes mellitus type 2, controlled        Therapy Treatment    Rehabilitation Treatment Summary     Row Name 19 1415             Treatment Time/Intention    Discipline  physical therapist  -EE      Document Type  therapy note (daily note)  -EE      Subjective Information  no complaints  -EE      Mode of Treatment  group therapy  -EE      Patient/Family Observations  UIC in gym in no acute distress  -EE      Therapy Frequency (PT Clinical Impression)  2 times/day  -EE      Patient Effort  good  -EE      Existing Precautions/Restrictions  fall  -EE      Recorded by [EE] Lourdes Cobos, PT 19 1526      Row Name 19 1415             Cognitive Assessment/Intervention    Additional Documentation  Cognitive Assessment/Intervention (Group)  -EE      Recorded by [EE] Lourdes Cobos, PT 19 1526      Row Name 19 1415             Cognitive Assessment/Intervention- PT/OT    Orientation Status (Cognition)  oriented x 4  -EE      Follows Commands (Cognition)  WNL  -EE      Personal Safety Interventions  fall  prevention program maintained;gait belt;muscle strengthening facilitated;nonskid shoes/slippers when out of bed;supervised activity  -EE      Recorded by [EE] Lourdes Cobos, PT 02/13/19 1526      Row Name 02/13/19 1415             Sit-Stand Transfer    Sit-Stand [place] (Transfers)  stand by assist  -EE      Assistive Device (Sit-Stand Transfers)  walker, front-wheeled  -EE      Recorded by [EE] Lourdes Cobos, PT 02/13/19 1526      Row Name 02/13/19 1415             Stand-Sit Transfer    Stand-Sit [place] (Transfers)  stand by assist  -EE      Assistive Device (Stand-Sit Transfers)  walker, front-wheeled  -EE      Recorded by [EE] Lourdes Cobos, PT 02/13/19 1526      Row Name 02/13/19 1415             Gait/Stairs Assessment/Training    [place] Level (Gait)  stand by assist;supervision;verbal cues  -EE      Assistive Device (Gait)  walker, front-wheeled  -EE      Distance in Feet (Gait)  100  -EE      Pattern (Gait)  step-through  -EE      Deviations/Abnormal Patterns (Gait)  antalgic;teo decreased  -EE      Left Sided Gait Deviations  heel strike decreased  -EE      Recorded by [EE] Lourdes Cobos, PT 02/13/19 1526      Row Name 02/13/19 1415             General ROM    GENERAL ROM COMMENTS  L knee AAROM 0-105  -EE      Recorded by [EE] Lourdes Cobos, PT 02/13/19 1527      Row Name 02/13/19 1415             Therapeutic Exercise    Comment (Therapeutic Exercise)  TKA protocol x 15 reps  -EE      Recorded by [EE] Lourdes Cobos, PT 02/13/19 1527      Row Name 02/13/19 1415             Positioning and Restraints    Pre-Treatment Position  sitting in chair/recliner  -EE      Post Treatment Position  chair  -EE      In Chair  reclined;call light within reach;encouraged to call for assist;with family/caregiver;legs elevated  -EE      Recorded by [EE] Lourdes Cobos, PT 02/13/19 1527      Row Name 02/13/19 1415             Pain Assessment    Additional Documentation  Pain Scale: Numbers Pre/Post-Treatment (Group)   -EE      Recorded by [EE] Lourdes Cobos, PT 02/13/19 1527      Row Name 02/13/19 1415             Pain Scale: Numbers Pre/Post-Treatment    Pain Scale: Numbers, Pretreatment  2/10  -EE      Pain Scale: Numbers, Post-Treatment  2/10  -EE      Pain Location - Side  Left  -EE      Pain Location - Orientation  incisional  -EE      Pain Location  knee  -EE      Pre/Post Treatment Pain Comment  tolerated tx  -EE      Pain Intervention(s)  Repositioned;Cold applied;Medication (See MAR)  -EE      Recorded by [EE] Lourdes Cobos, PT 02/13/19 1527      Row Name                Wound 02/12/19 1435 Left knee incision    Wound - Properties Group Date first assessed: 02/12/19 [EG] Time first assessed: 1435 [EG] Side: Left [EG] Location: knee [EG] Type: incision [EG] Recorded by:  [EG] Tana Klein RN 02/12/19 1435      User Key  (r) = Recorded By, (t) = Taken By, (c) = Cosigned By    Initials Name Effective Dates Discipline    EE Lourdes Cobos, PT 04/03/18 -  PT    EG Tana Klein, RN 07/10/17 -  Nurse          Wound 02/12/19 1435 Left knee incision (Active)   Dressing Appearance dry;intact;no drainage 2/13/2019  2:02 PM   Closure NICHELLE 2/13/2019  2:02 PM   Drainage Amount none 2/13/2019  2:02 PM   Dressing Care, Wound dressing removed;elastic bandage;other (see comments) 2/13/2019  8:40 AM   Periwound Care, Wound other (see comments) 2/12/2019  7:45 PM       Rehab Goal Summary     Row Name 02/13/19 1038             Physical Therapy Goals    Transfer Goal Selection (PT)  transfer, PT goal 1  -EM      Gait Training Goal Selection (PT)  gait training, PT goal 1  -EM      ROM Goal Selection (PT)  ROM, PT goal 1  -EM         Transfer Goal 1 (PT)    Activity/Assistive Device (Transfer Goal 1, PT)  sit-to-stand/stand-to-sit;walker, rolling  -EM      [place] Level/Cues Needed (Transfer Goal 1, PT)  conditional independence  -EM      Time Frame (Transfer Goal 1, PT)  3 days  -EM         Gait Training Goal 1 (PT)     Activity/Assistive Device (Gait Training Goal 1, PT)  walker, rolling  -EM      [place] Level (Gait Training Goal 1, PT)  conditional independence  -EM      Distance (Gait Goal 1, PT)  200  -EM      Time Frame (Gait Training Goal 1, PT)  3 days  -EM         ROM Goal 1 (PT)    ROM Goal 1 (PT)  L knee 0-100  -EM      Time Frame (ROM Goal 1, PT)  3 days  -EM         Patient Education Goal (PT)    Activity (Patient Education Goal, PT)  patietn able to perform exercises per TKA protocol   -EM      [place]/Cues/Accuracy (Memory Goal 2, PT)  demonstrates adequately;independent  -EM      Time Frame (Patient Education Goal, PT)  3 days  -EM        User Key  (r) = Recorded By, (t) = Taken By, (c) = Cosigned By    Initials Name Provider Type Discipline    EM Tana Ortega, PT Physical Therapist PT          Physical Therapy Education     Title: PT OT SLP Therapies (In Progress)     Topic: Physical Therapy (In Progress)     Point: Mobility training (In Progress)     Learning Progress Summary           Patient Acceptance, E, NR by EM at 2/13/2019 10:47 AM                   Point: Home exercise program (In Progress)     Learning Progress Summary           Patient Acceptance, E, NR by EM at 2/13/2019 10:47 AM                               User Key     Initials Effective Dates Name Provider Type Discipline     04/03/18 -  Tana Ortega PT Physical Therapist PT                PT Recommendation and Plan  Therapy Frequency (PT Clinical Impression): 2 times/day     Outcome Measures     Row Name 02/13/19 1000             How much help from another person do you currently need...    Turning from your back to your side while in flat bed without using bedrails?  4  -EM      Moving from lying on back to sitting on the side of a flat bed without bedrails?  4  -EM      Moving to and from a bed to a chair (including a wheelchair)?  3  -EM      Standing up from a chair using your arms (e.g., wheelchair, bedside chair)?   3  -EM      Climbing 3-5 steps with a railing?  3  -EM      To walk in hospital room?  3  -EM      AM-PAC 6 Clicks Score  20  -EM         Functional Assessment    Outcome Measure Options  AM-PAC 6 Clicks Basic Mobility (PT)  -EM        User Key  (r) = Recorded By, (t) = Taken By, (c) = Cosigned By    Initials Name Provider Type    Tana George PT Physical Therapist         Time Calculation:   PT Charges     Row Name 02/13/19 1527 02/13/19 1049          Time Calculation    Start Time  1415  -EE  0935  -EM     Stop Time  1509  -EE  0957  -EM     Time Calculation (min)  54 min  -EE  22 min  -EM     PT Received On  02/13/19  -EE  02/13/19  -EM     PT - Next Appointment  02/14/19  -EE  02/14/19  -EM     PT Goal Re-Cert Due Date  --  02/16/19  -EM        Time Calculation- PT    Total Timed Code Minutes- PT  22 minute(s)  -EE  13 minute(s)  -EM       User Key  (r) = Recorded By, (t) = Taken By, (c) = Cosigned By    Initials Name Provider Type    Lourdes Adams, PT Physical Therapist    Tana George PT Physical Therapist        Therapy Suggested Charges     Code   Minutes Charges    None           Therapy Charges for Today     Code Description Service Date Service Provider Modifiers Qty    46601999531 HC PT THER PROC EA 15 MIN 2/13/2019 Lourdes Cobos, PT GP 1    43539014117 HC PT THER PROC GROUP 2/13/2019 Lourdes Cobos, PT GP 1          PT G-Codes  Outcome Measure Options: AM-PAC 6 Clicks Basic Mobility (PT)  AM-PAC 6 Clicks Score: 20    Lourdes Cobos PT  2/13/2019

## 2019-02-13 NOTE — DISCHARGE PLACEMENT REQUEST
"Robin Patterson (73 y.o. Male)     Date of Birth Social Security Number Address Home Phone MRN    1945  [address] [address]  [id number]    [employer] Status          [place]        Admission Date Admission Type Admitting Provider Attending Provider Department, Room/Bed    2/12/19 Elective Seth Floyd MD Brown, Reid B, MD [address], P885/1    Discharge Date Discharge Disposition Discharge Destination                       Attending Provider:  Seth Floyd MD    Allergies:  Benazepril-hydrochlorothiazide, Zocor [Simvastatin]    Isolation:  None   Infection:  None   Code Status:  CPR    Ht:  180.3 cm (71\")   Wt:  91.3 kg (201 lb 5 oz)    Admission Cmt:  None   Principal Problem:  Primary osteoarthritis of right knee [M17.11] More...                 Active Insurance as of 2/12/2019     Primary Coverage     Payor Plan Insurance Group Employer/Plan Group    HUMANA MEDICARE REPLACEMENT HUMANA MEDICARE REPL [id number]     Payor Plan Address Payor Plan Phone Number Payor Plan Fax Number Effective Dates    [address] [address] [phone]  1/1/2013 - None Entered    [facility] [id number]       Subscriber Name Subscriber Birth Date Member ID       ROBIN PATTERSON 1945 [id number]                 Emergency Contacts      (Rel.) Home Phone Work Phone Mobile Phone    MignonCharo denny (Spouse) -- -- [phone]        "

## 2019-02-13 NOTE — CONSULTS
CONSULT NOTE    INTERNAL MEDICINE   [facility]       Referring Provider: Seth Floyd MD  Reason for Consultation: To evaluate chronic medical conditions that may be exacerbated postoperatively  PCP: Tim Corey MD      HPI  Patient is a 73 y.o. male presents with history of diabetes and recent diagnosis of factor V Leiden who presents underwent a left total knee arthritis 2 today.  Patient's normal regimen is Levemir 34 units at night however last night patient took 32 units at night and this morning had a blood sugar of 102.  He is also on 2 oral other oral medications for diabetes however he says that he starts having low blood sugars 6-9 PM and that's with eating carbs.  States his last A1c was 6.8.  He has lost about 14 pounds over this past year.      PAST MEDICAL HISTORY  Past Medical History:   Diagnosis Date   • Asthma    • Coronary artery disease    • Diabetes mellitus (CMS/HCC)    • Diverticulosis of colon    • Factor 5 Leiden mutation, heterozygous (CMS/HCC)    • GERD (gastroesophageal reflux disease)    • H/O cornea transplant    • Hip pain, chronic, left    • History of skin cancer    • History of stomach ulcers    • Hypertension    • IBS (irritable bowel syndrome)    • Infectious viral hepatitis     CHILDHOOD   • Joint pain    • Knee pain, bilateral    • Numbness or tingling     LEFT HAND   • PONV (postoperative nausea and vomiting)    • Ringing in ears    • Sleep apnea     CPAP   • Stiffness in joint        PAST SURGICAL HISTORY  Past Surgical History:   Procedure Laterality Date   • CHOLECYSTECTOMY     • COLONOSCOPY     • CORNEAL TRANSPLANT      GARETT   • CORONARY ANGIOPLASTY  2015    and 2011   • KNEE ARTHROSCOPY Right 3/20/2018    Procedure: RIGHT KNEE ARTHROSCOPY, PARTIAL MEDIAL MENISECTOMY, ABRASSSION CHONDROPLASTY, AND LOOSE BODY REMOVAL;  Surgeon: Logan Roblero MD;  Location: [facility] OR [facility];  Service: Orthopedics   • KNEE SURGERY  06/23/2015   • ROTATOR  CUFF REPAIR Left        FAMILY HISTORY  Family History   Problem Relation Age of Onset   • Stomach cancer Other    • Diabetes Other    • Heart disease Other    • Hypertension Other    • Malig Hyperthermia Neg Hx        SOCIAL HISTORY  Social History     Tobacco Use   • Smoking status: Never Smoker   • Smokeless tobacco: Never Used   Substance Use Topics   • Alcohol use: No   • Drug use: No       MEDICATIONS:  Medications Prior to Admission   Medication Sig Dispense Refill Last Dose   • acetaminophen (TYLENOL ARTHRITIS PAIN) 650 MG 8 hr tablet Take 650 mg by mouth Every 8 (Eight) Hours As Needed for Mild Pain .   Past Month at Unknown time   • amLODIPine (NORVASC) 10 MG tablet Take 5 mg by mouth 2 (Two) Times a Day.   2/12/2019 at 0600   • cetirizine (ZyrTEC) 10 MG tablet Take 10 mg by mouth Daily As Needed.   2/11/2019 at 2100   • Chlorhexidine Gluconate Cloth 2 % pads Apply  topically Take As Directed.   2/12/2019 at 0600   • cholecalciferol (VITAMIN D3) 1000 units tablet Take 1,000 Units by mouth Daily.   2/4/2019   • glipiZIDE (GLUCOTROL) 10 MG tablet Take 10 mg by mouth 2 (Two) Times a Day Before Meals.   2/11/2019 at 1800   • Insulin Detemir (LEVEMIR FLEXPEN SC) Inject 34 Units under the skin Every Evening. 1 time at night    2/11/2019 at 2100   • Lactobacillus (PROBIOTIC ACIDOPHILUS) capsule Take 1 tablet/day by mouth Daily.   2/5/2019 at 0600   • losartan-hydrochlorothiazide (HYZAAR) 50-12.5 MG per tablet Take 1 tablet by mouth Daily.   2/12/2019 at 0600   • loteprednol (LOTEMAX) 0.5 % ophthalmic suspension Administer 1 drop to both eyes Daily.   2/11/2019 at 0800   • metFORMIN (GLUCOPHAGE) 500 MG tablet Take 500 mg by mouth 2 (Two) Times a Day With Meals.   2/11/2019 at 1800   • metoprolol tartrate (LOPRESSOR) 50 MG tablet Take 25 mg by mouth Every 12 (Twelve) Hours.   2/12/2019 at 0600   • mupirocin (BACTROBAN) 2 % nasal ointment into the nostril(s) as directed by provider Take As Directed.   2/12/2019 at  "0600   • pantoprazole (PROTONIX) 40 MG EC tablet Take 40 mg by mouth Every Morning.  1 2/12/2019 at 0600   • rosuvastatin (CRESTOR) 20 MG tablet Take 10 mg by mouth Every Night.   2/11/2019 at 2100   • aspirin 81 MG tablet Take 81 mg by mouth Daily. PT WILL HOLD FOR SURGERY 1 WEEK PRIOR   2/5/2019   • Multiple Vitamins-Minerals (MULTIVITAMIN ADULT PO) Take 1 tablet by mouth Daily.   2/5/2019   • nitroglycerin (NITROSTAT) 0.4 MG SL tablet Place 0.4 mg under the tongue Every 5 (Five) Minutes As Needed for Chest Pain. Take no more than 3 doses in 15 minutes.   Not Taking       Allergies:  Benazepril-hydrochlorothiazide and Zocor [simvastatin]    Review of Systems:  Intentional weigh loss  Negative for following (except as per HPI):  Constitution: chills, fevers, fatigue   Eyes: change of vision, loss of vision and discharge  ENT: ear drainage, ear ringing and facial trauma  Respiratory: cough, pleuritic pain, shortness of air  Cardiovascular: chest pressure, pain, lower extremity edema, palpitations  Gastrointestinal: constipation, diarrhea, nausea, vomiting, pain    Integument: rash and wound  Hematologic / Lymphatic: excessive bleeding and easy bruising  Musculoskeletal: joint pain, joint stiffness, joint swelling and muscle pain  Neurological: headaches, numbness, seizures and tremors  Behavioral / Psych: anxiety, depression and hallucinations         Vital Signs  Temp:  [97.3 °F (36.3 °C)-98 °F (36.7 °C)] 97.3 °F (36.3 °C)  Heart Rate:  [45-74] 74  Resp:  [14-18] 16  BP: (123-143)/(65-79) 133/71  Flowsheet Rows      First Filed Value   Admission Height  180.3 cm (71\") Documented at 02/12/2019 0942   Admission Weight  91.3 kg (201 lb 5 oz) Documented at 02/12/2019 0942           Physical Exam:  General Appearance:    Alert, cooperative, in no acute distress   Head:    Normocephalic, without obvious abnormality, atraumatic   Eyes:         conjunctivae and sclerae normal, no icterus, PERRLA   ENT:    Ears grossly " intact, oral mucosa moist,   Neck:   No adenopathy, supple, trachea midline,    Back:     Normal to inspection, range of motion normal   Lungs:     Clear to auscultation,respirations regular, even and                   unlabored    Heart:    Regular rhythm and normal rate,  no murmur, normal S1 and S2,   Abdomen:     Normal bowel sounds, no masses,  soft non-tender, non-distended,    Extremities:   Moves all extremities well, no cyanosis, right knee brace and ice pack            Pulses:   Pulses palpable and equal bilaterally   Skin:   No bleeding, rash, bruising    Neurologic:    Psych:   Cranial nerves 2 - 12 grossly intact, sensation intact,     Moves all extremities well, equal bilateral strength    Alert and Oriented x 3, Normal Affect     LABS:  Admission on 02/12/2019   Component Date Value Ref Range Status   • Glucose 02/12/2019 173* 70 - 130 mg/dL Final   • Glucose 02/12/2019 153* 70 - 130 mg/dL Final   • Protime 02/12/2019 14.5* 11.7 - 14.2 Seconds Final   • INR 02/12/2019 1.15* 0.90 - 1.10 Final   • Glucose 02/12/2019 381* 70 - 130 mg/dL Final       DIAGNOSTICS:  Xr Knee 1 Or 2 View Left    Result Date: 2/12/2019  Left knee arthroplasty as expected..  This report was finalized on 2/12/2019 4:20 PM by Dr. Ahsan Duarte M.D.           Results Review:   I reviewed the patient's new clinical results.  I personally viewed and interpreted the patient's EKG/Telemetry data sinus  Old records reviewed preop labs showed a creatinine 1.189 GFR 61    ASSESSMENT AND PLAN     +Diabetes mellitus type 2, controlled   -Accu checks  -hypoglycemia protocol  -sliding scale insulin to cover outlier blood sugars  -on glipizide bid- continue  -would hold metformin during perioperative time period due to poss hypoxia  -Patient normally takes 34 units of insulin at night, he only took 32 units last night and had a blood sugar of 102 in the morning.  Patient does state that he has low blood sugars usually between 6 and 9 PM and  that's with eating carbs.  He does state he has lost weight- check an A1c in the morning- will restart the Lantus at 30 units this evening- patient not likely to eat the same as he does at home  -Diabetic educator to look into patient's hypoglycemia     +Primary osteoarthritis of right knee/ Primary osteoarthritis of left knee   s/p left TKA  -iv and po pain meds    +  Sleep apnea-   -Stable continue CPAP     + Hypertension  -Monitor, continue patient's Norvasc, HCTZ and losartan     + Factor 5 Leiden mutation, heterozygous    -Patient is usually only on aspirin for this, patient was recently diagnosed due to his daughter and his brother both having blood clotting issues.  Patient will be started on Coumadin.  Patient is at high risk for DVT due to hospitalization, orthopedic surgery, and clotting disorder    +DVT proph: lovenox 30 and eventually coumadin  +Medical decision maker: wife    I discussed the patients findings and my recommendations with the patient and/or family.  Please reference all orders placed.    Ange Remy MD  02/12/19  9:51 PM

## 2019-02-13 NOTE — PLAN OF CARE
Problem: Patient Care Overview  Goal: Plan of Care Review  Outcome: Ongoing (interventions implemented as appropriate)   02/13/19 3848   Plan of Care Review   Progress improving   OTHER   Outcome Summary POD 1 LTK, KRYSTINA, RA, SL, accucheck, assist x1, voiding well, doppler bilateral extremities prelim negative for DVT, educate pt on bp and glucose monitoring, should d/c home w/HH tomorrow   Coping/Psychosocial   Plan of Care Reviewed With patient     Goal: Individualization and Mutuality  Outcome: Ongoing (interventions implemented as appropriate)      Problem: Fall Risk (Adult)  Goal: Identify Related Risk Factors and Signs and Symptoms  Outcome: Outcome(s) achieved Date Met: 02/13/19    Goal: Absence of Fall  Outcome: Ongoing (interventions implemented as appropriate)      Problem: Knee Arthroplasty (Total, Partial) (Adult)  Goal: Anesthesia/Sedation Recovery  Outcome: Ongoing (interventions implemented as appropriate)      Problem: Diabetes, Type 2 (Adult)  Goal: Signs and Symptoms of Listed Potential Problems Will be Absent, Minimized or Managed (Diabetes, Type 2)  Outcome: Ongoing (interventions implemented as appropriate)

## 2019-02-13 NOTE — PROGRESS NOTES
Discharge Planning Assessment  [facility]     Patient Name: Robin Karimi  MRN: [id number]  Today's Date: 2/13/2019    Admit Date: 2/12/2019    Discharge Needs Assessment     Row Name 02/13/19 1514       Living Environment    Lives With  spouse    Current Living Arrangements  home/apartment/condo       Discharge Needs Assessment    Readmission Within the Last 30 Days  no previous admission in last 30 days    Concerns to be Addressed  discharge planning    Discharge Facility/Level of Care Needs  home with home health        Discharge Plan     Row Name 02/13/19 1515       Plan    Plan  [facility]    Patient/Family in Agreement with Plan  yes    Plan Comments  See prescreen assessment per [facility]. Pt has used [facility] in the past and would like them again ([facility]), referral given to Chanda with [facility] who states they are able to accept but since the pt is on Coumadin they will also use [facility] for Nursing. Referral also given to Mora with [facility]. Plan will be for pt to d/c home with [facility] for Nursing and [facility] for PT.         Destination      No service coordination in this encounter.      Durable Medical Equipment      No service coordination in this encounter.      Dialysis/Infusion      No service coordination in this encounter.      Home Medical Care - Selection Complete      Service Provider Request Status Selected Services Address Phone Number Fax Number    [facility] [address] [facility] [address] [phone] --    [facility] Accepted N/A [address] [address] [phone]      Community Resources      No service coordination in this encounter.          Demographic Summary    No documentation.       Functional Status    No documentation.       Psychosocial    No documentation.       Abuse/Neglect    No documentation.       Legal    No documentation.       Substance Abuse    No documentation.       Patient Forms      Row Name 02/13/19 1517       Patient Forms    Provider Choice List  Delivered    Delivered to  Patient            Allyssa Santa RN

## 2019-02-13 NOTE — PROGRESS NOTES
"DAILY PROGRESS NOTE  [facility]    Patient Identification:  Name: Robin Karimi  Age: 73 y.o.  Sex: male  :  1945  MRN: [id number]         Primary Care Physician: Tim Corey MD    Subjective:  Interval History:He complains of pain    Objective:    Scheduled Meds:  amLODIPine 5 mg Oral BID   docusate sodium 100 mg Oral BID   enoxaparin 30 mg Subcutaneous Q12H   glipiZIDE 10 mg Oral BID AC   insulin glargine 30 Units Subcutaneous Nightly   insulin lispro 0-7 Units Subcutaneous 4x Daily With Meals & Nightly   ketorolac 30 mg Intravenous Q8H   losartan-HCTZ (HYZAAR) 50-12.5 combo dose  Oral Daily   loteprednol 1 drop Both Eyes Daily   meloxicam 15 mg Oral Daily   metoprolol tartrate 25 mg Oral Q12H   mupirocin  Each Nare BID   pantoprazole 40 mg Oral QAM   polyethylene glycol 17 g Oral BID   warfarin 4 mg Oral Daily     Continuous Infusions:     Vital signs in last 24 hours:  Temp:  [97.3 °F (36.3 °C)-98 °F (36.7 °C)] 97.4 °F (36.3 °C)  Heart Rate:  [45-75] 69  Resp:  [14-18] 18  BP: (113-143)/(60-79) 121/60    Intake/Output:    Intake/Output Summary (Last 24 hours) at 2019 1034  Last data filed at 2019 0900  Gross per 24 hour   Intake 480 ml   Output 700 ml   Net -220 ml       Exam:  /60 (BP Location: Left arm, Patient Position: Lying)   Pulse 69   Temp 97.4 °F (36.3 °C) (Oral)   Resp 18   Ht 180.3 cm (71\")   Wt 91.3 kg (201 lb 5 oz)   SpO2 96%   BMI 28.08 kg/m²     General Appearance:    Alert, cooperative, no distress   Head:    Normocephalic, without obvious abnormality, atraumatic   Eyes:       Throat:   Lips, tongue, gums normal   Neck:   Supple, symmetrical, trachea midline, no JVD   Lungs:     Clear to auscultation bilaterally, respirations unlabored   Chest Wall:    No tenderness or deformity    Heart:    Regular rate and rhythm, S1 and S2 normal, no murmur,no  Rub or gallop   Abdomen:     Soft, non-tender, bowel sounds active, no masses, no " organomegaly    Extremities:   Extremities normal, atraumatic, no cyanosis or edema   Pulses:      Skin:   Skin is warm and dry,  no rashes or palpable lesions   Neurologic:   no focal deficits noted      Lab Results (last 72 hours)     Procedure Component Value Units Date/Time    POC Glucose Once [[id number]]  (Abnormal) Collected:  02/13/19 0510    Specimen:  Blood Updated:  02/13/19 0514     Glucose 270 mg/dL     Hemoglobin A1c [[id number]]  (Abnormal) Collected:  02/13/19 0327    Specimen:  Blood Updated:  02/13/19 0410     Hemoglobin A1C 6.70 %     Narrative:       Hemoglobin A1C Ranges:    Increased Risk for Diabetes  5.7% to 6.4%  Diabetes                     >= 6.5%  Diabetic Goal                < 7.0%    Comprehensive Metabolic Panel [[id number]]  (Abnormal) Collected:  02/13/19 0327    Specimen:  Blood Updated:  02/13/19 0409     Glucose 308 mg/dL      BUN 21 mg/dL      Creatinine 1.25 mg/dL      Sodium 135 mmol/L      Potassium 4.3 mmol/L      Chloride 97 mmol/L      CO2 25.9 mmol/L      Calcium 9.0 mg/dL      Total Protein 6.6 g/dL      Albumin 3.90 g/dL      ALT (SGPT) 44 U/L      AST (SGOT) 61 U/L      Alkaline Phosphatase 53 U/L      Total Bilirubin 1.4 mg/dL      eGFR Non African Amer 57 mL/min/1.73      Globulin 2.7 gm/dL      A/G Ratio 1.4 g/dL      BUN/Creatinine Ratio 16.8     Anion Gap 12.1 mmol/L     Narrative:       The MDRD GFR formula is only valid for adults with stable renal function between ages 18 and 70.    Protime-INR [[id number]]  (Abnormal) Collected:  02/13/19 0327    Specimen:  Blood Updated:  02/13/19 0356     Protime 14.5 Seconds      INR 1.15    Hemoglobin & Hematocrit, Blood [[id number]]  (Normal) Collected:  02/13/19 0327    Specimen:  Blood Updated:  02/13/19 0346     Hemoglobin 13.1 g/dL      Hematocrit 39.1 %     POC Glucose Once [[id number]]  (Abnormal) Collected:  02/12/19 2133    Specimen:  Blood Updated:  02/12/19 2145     Glucose 381 mg/dL     Protime-INR [[id number]]   (Abnormal) Collected:  02/12/19 1940    Specimen:  Blood Updated:  02/12/19 2011     Protime 14.5 Seconds      INR 1.15    POC Glucose Once [[id number]]  (Abnormal) Collected:  02/12/19 1218    Specimen:  Blood Updated:  02/12/19 1224     Glucose 153 mg/dL     POC Glucose Once [[id number]]  (Abnormal) Collected:  02/12/19 0942    Specimen:  Blood Updated:  02/12/19 0944     Glucose 173 mg/dL         Data Review:  Results from last 7 days   Lab Units 02/13/19  0327   SODIUM mmol/L 135*   POTASSIUM mmol/L 4.3   CHLORIDE mmol/L 97*   CO2 mmol/L 25.9   BUN mg/dL 21   CREATININE mg/dL 1.25   GLUCOSE mg/dL 308*   CALCIUM mg/dL 9.0     Results from last 7 days   Lab Units 02/13/19  0327   HEMOGLOBIN g/dL 13.1   HEMATOCRIT % 39.1         Results from last 7 days   Lab Units 02/13/19  0327   HEMOGLOBIN A1C % 6.70*     No results found for: TROPONINT      Results from last 7 days   Lab Units 02/13/19  0327   ALK PHOS U/L 53   BILIRUBIN mg/dL 1.4*   ALT (SGPT) U/L 44*   AST (SGOT) U/L 61*         Results from last 7 days   Lab Units 02/13/19  0327   HEMOGLOBIN A1C % 6.70*     Glucose   Date/Time Value Ref Range Status   02/13/2019 0510 270 (H) 70 - 130 mg/dL Final   02/12/2019 2133 381 (H) 70 - 130 mg/dL Final   02/12/2019 1218 153 (H) 70 - 130 mg/dL Final   02/12/2019 0942 173 (H) 70 - 130 mg/dL Final     Results from last 7 days   Lab Units 02/13/19  0327 02/12/19 1940   INR  1.15* 1.15*       Past Medical History:   Diagnosis Date   • Asthma    • Coronary artery disease    • Diabetes mellitus (CMS/HCC)    • Diverticulosis of colon    • Factor 5 Leiden mutation, heterozygous (CMS/HCC)    • GERD (gastroesophageal reflux disease)    • H/O cornea transplant    • Hip pain, chronic, left    • History of skin cancer    • History of stomach ulcers    • Hypertension    • IBS (irritable bowel syndrome)    • Infectious viral hepatitis     CHILDHOOD   • Joint pain    • Knee pain, bilateral    • Numbness or tingling     LEFT HAND   •  PONV (postoperative nausea and vomiting)    • Ringing in ears    • Sleep apnea     CPAP   • Stiffness in joint        Assessment:  Active Hospital Problems    Diagnosis Date Noted   • **Primary osteoarthritis of right knee [M17.11] 01/04/2019   • Sleep apnea- CPAP [G47.30] 02/13/2019   • Hypertension [I10] 02/13/2019   • Factor 5 Leiden mutation, heterozygous [D68.51] 02/13/2019   • Diabetes mellitus type 2, controlled  [E11.9] 02/13/2019   • Primary osteoarthritis of left knee [M17.12] 01/04/2019      Resolved Hospital Problems   No resolved problems to display.       Plan:  Continue with current RX and sliding scale insulin.  Will follow.    Sony Lopez MD  2/13/2019  10:34 AM

## 2019-02-13 NOTE — PLAN OF CARE
Problem: Patient Care Overview  Goal: Plan of Care Review   02/13/19 1047   OTHER   Outcome Summary patient presents s/p L TKA with impairments consisting of decreased ROM L knee, generalized post op weakness and pain and patient would benefit from skilled PT. Patient lives with wife, independent with mobility prior to sx, no stairs to enter home. Horacio reports he has necessary DME at home, anticipate d/c home with HH tomorrow

## 2019-02-14 VITALS
TEMPERATURE: 97.4 F | RESPIRATION RATE: 16 BRPM | HEART RATE: 70 BPM | OXYGEN SATURATION: 96 % | WEIGHT: 201.31 LBS | BODY MASS INDEX: 28.18 KG/M2 | SYSTOLIC BLOOD PRESSURE: 126 MMHG | HEIGHT: 71 IN | DIASTOLIC BLOOD PRESSURE: 75 MMHG

## 2019-02-14 LAB
GLUCOSE BLDC GLUCOMTR-MCNC: 183 MG/DL (ref 70–130)
GLUCOSE BLDC GLUCOMTR-MCNC: 203 MG/DL (ref 70–130)
HCT VFR BLD AUTO: 38.7 % (ref 37.5–51)
HGB BLD-MCNC: 12.8 G/DL (ref 13–17.7)
INR PPP: 1.46 (ref 0.9–1.1)
PROTHROMBIN TIME: 17.5 SECONDS (ref 11.7–14.2)

## 2019-02-14 PROCEDURE — 85610 PROTHROMBIN TIME: CPT | Performed by: ORTHOPAEDIC SURGERY

## 2019-02-14 PROCEDURE — G0378 HOSPITAL OBSERVATION PER HR: HCPCS

## 2019-02-14 PROCEDURE — 97110 THERAPEUTIC EXERCISES: CPT

## 2019-02-14 PROCEDURE — 63710000001 INSULIN LISPRO (HUMAN) PER 5 UNITS: Performed by: INTERNAL MEDICINE

## 2019-02-14 PROCEDURE — 85014 HEMATOCRIT: CPT | Performed by: ORTHOPAEDIC SURGERY

## 2019-02-14 PROCEDURE — 85018 HEMOGLOBIN: CPT | Performed by: ORTHOPAEDIC SURGERY

## 2019-02-14 PROCEDURE — 97150 GROUP THERAPEUTIC PROCEDURES: CPT

## 2019-02-14 PROCEDURE — 82962 GLUCOSE BLOOD TEST: CPT

## 2019-02-14 PROCEDURE — 25010000002 ENOXAPARIN PER 10 MG: Performed by: ORTHOPAEDIC SURGERY

## 2019-02-14 RX ORDER — WARFARIN SODIUM 3 MG/1
3 TABLET ORAL NIGHTLY
Qty: 30 TABLET | Refills: 0 | Status: SHIPPED | OUTPATIENT
Start: 2019-02-14 | End: 2019-03-05 | Stop reason: SDUPTHER

## 2019-02-14 RX ORDER — MELOXICAM 15 MG/1
15 TABLET ORAL DAILY
Qty: 14 TABLET | Refills: 0 | Status: SHIPPED | OUTPATIENT
Start: 2019-02-15 | End: 2019-03-01

## 2019-02-14 RX ORDER — ONDANSETRON 4 MG/1
4 TABLET, FILM COATED ORAL EVERY 6 HOURS PRN
Qty: 10 TABLET | Refills: 0 | Status: SHIPPED | OUTPATIENT
Start: 2019-02-14 | End: 2020-02-20

## 2019-02-14 RX ORDER — HYDROCODONE BITARTRATE AND ACETAMINOPHEN 7.5; 325 MG/1; MG/1
1-2 TABLET ORAL EVERY 4 HOURS PRN
Qty: 84 TABLET | Refills: 0 | Status: SHIPPED | OUTPATIENT
Start: 2019-02-14 | End: 2019-03-05 | Stop reason: SDUPTHER

## 2019-02-14 RX ADMIN — INSULIN LISPRO 2 UNITS: 100 INJECTION, SOLUTION INTRAVENOUS; SUBCUTANEOUS at 11:54

## 2019-02-14 RX ADMIN — METOPROLOL TARTRATE 25 MG: 25 TABLET ORAL at 08:06

## 2019-02-14 RX ADMIN — MELOXICAM 15 MG: 15 TABLET ORAL at 08:06

## 2019-02-14 RX ADMIN — ENOXAPARIN SODIUM 30 MG: 30 INJECTION SUBCUTANEOUS at 11:54

## 2019-02-14 RX ADMIN — MUPIROCIN 10 APPLICATION: 20 OINTMENT TOPICAL at 08:07

## 2019-02-14 RX ADMIN — LOTEPREDNOL ETABONATE 1 DROP: 5 SUSPENSION/ DROPS OPHTHALMIC at 08:08

## 2019-02-14 RX ADMIN — GLIPIZIDE 10 MG: 10 TABLET ORAL at 08:06

## 2019-02-14 RX ADMIN — PANTOPRAZOLE SODIUM 40 MG: 40 TABLET, DELAYED RELEASE ORAL at 06:16

## 2019-02-14 RX ADMIN — HYDROCODONE BITARTRATE AND ACETAMINOPHEN 2 TABLET: 7.5; 325 TABLET ORAL at 03:24

## 2019-02-14 RX ADMIN — LOSARTAN POTASSIUM: 50 TABLET, FILM COATED ORAL at 08:06

## 2019-02-14 RX ADMIN — DOCUSATE SODIUM 100 MG: 100 CAPSULE, LIQUID FILLED ORAL at 08:07

## 2019-02-14 RX ADMIN — AMLODIPINE BESYLATE 5 MG: 5 TABLET ORAL at 08:06

## 2019-02-14 RX ADMIN — INSULIN LISPRO 3 UNITS: 100 INJECTION, SOLUTION INTRAVENOUS; SUBCUTANEOUS at 08:07

## 2019-02-14 RX ADMIN — HYDROCODONE BITARTRATE AND ACETAMINOPHEN 2 TABLET: 7.5; 325 TABLET ORAL at 07:28

## 2019-02-14 RX ADMIN — POLYETHYLENE GLYCOL 3350 17 G: 17 POWDER, FOR SOLUTION ORAL at 08:07

## 2019-02-14 RX ADMIN — HYDROCODONE BITARTRATE AND ACETAMINOPHEN 2 TABLET: 7.5; 325 TABLET ORAL at 11:54

## 2019-02-14 NOTE — DISCHARGE SUMMARY
Patient Name: Robin Karimi  Patient YOB: 1945    Date of Admission:  2/12/2019  Date of Discharge:  2/14/2019  Discharge Diagnosis: TOTAL KNEE ARTHROPLASTY  Presenting Problem/History of Present Illness: Primary osteoarthritis of right knee [M17.11]  Primary osteoarthritis of right knee [M17.11]  Primary osteoarthritis of left knee [M17.12]  Admitting Physician: Dr Seth Floyd  Consults:   Consults     Date and Time Order Name Status Description    2/12/2019 1831 Inpatient Internal Medicine Consult Completed           DETAILS OF HOSPITAL STAY:  Patient is a 73 y.o. male was admitted to the floor following the above procedure and underwent an uncomplicated hospital stay. [facility] consult was obtained for blood glucose mgt.  Patient did well with physical therapy and was ambulating well without problems. Duples U/S on POD 2 shower no evidence of DVT  On the day of discharge the wound was clean, dry and intact and calf was soft and non tender and Homans sign was negative.  Patient was tolerating  without problems.  Patient will be discharged home.    Condition on Discharge:  Stable    Vital Signs  Temp:  [97.4 °F (36.3 °C)-99.5 °F (37.5 °C)] 97.4 °F (36.3 °C)  Heart Rate:  [57-70] 70  Resp:  [16] 16  BP: ()/(50-75) 126/75    LABS:      Admission on 02/12/2019   Component Date Value Ref Range Status   • Glucose 02/12/2019 173* 70 - 130 mg/dL Final   • Glucose 02/12/2019 153* 70 - 130 mg/dL Final   • Protime 02/12/2019 14.5* 11.7 - 14.2 Seconds Final   • INR 02/12/2019 1.15* 0.90 - 1.10 Final   • Right Common Femoral Spont 02/13/2019 Y   Final   • Right Common Femoral Phasic 02/13/2019 Y   Final   • Right Common Femoral Augment 02/13/2019 Y   Final   • Right Common Femoral Competent 02/13/2019 Y   Final   • Right Common Femoral Compress 02/13/2019 C   Final   • Right Saphenofemoral Junction Spont 02/13/2019 Y   Final   • Right Saphenofemoral Junction Phas* 02/13/2019 Y   Final   • Right Saphenofemoral  Junction Augm* 02/13/2019 Y   Final   • Right Saphenofemoral Junction Comp* 02/13/2019 Y   Final   • Right Saphenofemoral Junction Comp* 02/13/2019 C   Final   • Right Proximal Femoral Compress 02/13/2019 C   Final   • Right Mid Femoral Spont 02/13/2019 Y   Final   • Right Mid Femoral Phasic 02/13/2019 Y   Final   • Right Mid Femoral Augment 02/13/2019 Y   Final   • Right Mid Femoral Competent 02/13/2019 Y   Final   • Right Mid Femoral Compress 02/13/2019 C   Final   • Right Distal Femoral Compress 02/13/2019 C   Final   • Right Popliteal Spont 02/13/2019 Y   Final   • Right Popliteal Phasic 02/13/2019 Y   Final   • Right Popliteal Augment 02/13/2019 Y   Final   • Right Popliteal Competent 02/13/2019 Y   Final   • Right Popliteal Compress 02/13/2019 C   Final   • Right Posterior Tibial Compress 02/13/2019 C   Final   • Right Peroneal Compress 02/13/2019 C   Final   • Right GastronemiusSoleal Compress 02/13/2019 C   Final   • Right Greater Saph AK Compress 02/13/2019 C   Final   • Right Greater Saph BK Compress 02/13/2019 C   Final   • Left Common Femoral Spont 02/13/2019 Y   Final   • Left Common Femoral Phasic 02/13/2019 Y   Final   • Left Common Femoral Augment 02/13/2019 Y   Final   • Left Common Femoral Competent 02/13/2019 Y   Final   • Left Common Femoral Compress 02/13/2019 C   Final   • Left Saphenofemoral Junction Spont 02/13/2019 Y   Final   • Left Saphenofemoral Junction Phasic 02/13/2019 Y   Final   • Left Saphenofemoral Junction Augme* 02/13/2019 Y   Final   • Left Saphenofemoral Junction Compe* 02/13/2019 Y   Final   • Left Saphenofemoral Junction Compr* 02/13/2019 C   Final   • Left Proximal Femoral Compress 02/13/2019 C   Final   • Left Mid Femoral Spont 02/13/2019 Y   Final   • Left Mid Femoral Phasic 02/13/2019 Y   Final   • Left Mid Femoral Augment 02/13/2019 Y   Final   • Left Mid Femoral Competent 02/13/2019 Y   Final   • Left Mid Femoral Compress 02/13/2019 C   Final   • Left Distal Femoral  Compress 02/13/2019 C   Final   • Left Popliteal Spont 02/13/2019 Y   Final   • Left Popliteal Phasic 02/13/2019 Y   Final   • Left Popliteal Augment 02/13/2019 Y   Final   • Left Popliteal Competent 02/13/2019 Y   Final   • Left Popliteal Compress 02/13/2019 C   Final   • Left Posterior Tibial Compress 02/13/2019 C   Final   • Left Peroneal Compress 02/13/2019 C   Final   • Left GastronemiusSoleal Compress 02/13/2019 C   Final   • Left Greater Saph AK Compress 02/13/2019 C   Final   • Left Greater Saph BK Compress 02/13/2019 C   Final   • Glucose 02/12/2019 381* 70 - 130 mg/dL Final   • Hemoglobin 02/13/2019 13.1  13.0 - 17.7 g/dL Final   • Hematocrit 02/13/2019 39.1  37.5 - 51.0 % Final   • Protime 02/13/2019 14.5* 11.7 - 14.2 Seconds Final   • INR 02/13/2019 1.15* 0.90 - 1.10 Final   • Glucose 02/13/2019 308* 65 - 99 mg/dL Final   • BUN 02/13/2019 21  8 - 23 mg/dL Final   • Creatinine 02/13/2019 1.25  0.76 - 1.27 mg/dL Final   • Sodium 02/13/2019 135* 136 - 145 mmol/L Final   • Potassium 02/13/2019 4.3  3.5 - 5.2 mmol/L Final   • Chloride 02/13/2019 97* 98 - 107 mmol/L Final   • CO2 02/13/2019 25.9  22.0 - 29.0 mmol/L Final   • Calcium 02/13/2019 9.0  8.6 - 10.5 mg/dL Final   • Total Protein 02/13/2019 6.6  6.0 - 8.5 g/dL Final   • Albumin 02/13/2019 3.90  3.50 - 5.20 g/dL Final   • ALT (SGPT) 02/13/2019 44* 1 - 41 U/L Final   • AST (SGOT) 02/13/2019 61* 1 - 40 U/L Final   • Alkaline Phosphatase 02/13/2019 53  39 - 117 U/L Final   • Total Bilirubin 02/13/2019 1.4* 0.1 - 1.2 mg/dL Final   • eGFR Non African Amer 02/13/2019 57* >60 mL/min/1.73 Final   • Globulin 02/13/2019 2.7  gm/dL Final   • A/G Ratio 02/13/2019 1.4  g/dL Final   • BUN/Creatinine Ratio 02/13/2019 16.8  7.0 - 25.0 Final   • Anion Gap 02/13/2019 12.1  mmol/L Final   • Hemoglobin A1C 02/13/2019 6.70* 4.80 - 5.60 % Final   • Glucose 02/13/2019 270* 70 - 130 mg/dL Final   • Glucose 02/13/2019 164* 70 - 130 mg/dL Final   • Glucose 02/13/2019 172* 70 -  130 mg/dL Final   • Glucose 02/13/2019 222* 70 - 130 mg/dL Final   • Hemoglobin 02/14/2019 12.8* 13.0 - 17.7 g/dL Final   • Hematocrit 02/14/2019 38.7  37.5 - 51.0 % Final   • Protime 02/14/2019 17.5* 11.7 - 14.2 Seconds Final   • INR 02/14/2019 1.46* 0.90 - 1.10 Final   • Glucose 02/14/2019 203* 70 - 130 mg/dL Final       No results found.    Discharge Medications     Discharge Medications      New Medications      Instructions Start Date   enoxaparin 30 MG/0.3ML solution syringe  Commonly known as:  LOVENOX   30 mg, Subcutaneous, Every 12 Hours      HYDROcodone-acetaminophen 7.5-325 MG per tablet  Commonly known as:  NORCO   1-2 po q 4-6 hr prn pain      meloxicam 15 MG tablet  Commonly known as:  MOBIC   15 mg, Oral, Daily      ondansetron 4 MG tablet  Commonly known as:  ZOFRAN   4 mg, Oral, Every 6 Hours PRN      polyethylene glycol pack packet  Commonly known as:  MIRALAX   17 g, Oral, 2 Times Daily      warfarin 3 MG tablet  Commonly known as:  COUMADIN   3 mg, Oral, Nightly         Continue These Medications      Instructions Start Date   amLODIPine 10 MG tablet  Commonly known as:  NORVASC   5 mg, Oral, 2 Times Daily      cetirizine 10 MG tablet  Commonly known as:  zyrTEC   10 mg, Oral, Daily PRN      cholecalciferol 1000 units tablet  Commonly known as:  VITAMIN D3   1,000 Units, Oral, Daily      glipiZIDE 10 MG tablet  Commonly known as:  GLUCOTROL   10 mg, Oral, 2 Times Daily Before Meals      LEVEMIR FLEXPEN SC   34 Units, Subcutaneous, Every Evening, 1 time at night      losartan-hydrochlorothiazide 50-12.5 MG per tablet  Commonly known as:  HYZAAR   1 tablet, Oral, Daily      loteprednol 0.5 % ophthalmic suspension  Commonly known as:  LOTEMAX   1 drop, Both Eyes, Daily      metFORMIN 500 MG tablet  Commonly known as:  GLUCOPHAGE   500 mg, Oral, 2 Times Daily With Meals      metoprolol tartrate 50 MG tablet  Commonly known as:  LOPRESSOR   25 mg, Oral, Every 12 Hours Scheduled      nitroglycerin 0.4  MG SL tablet  Commonly known as:  NITROSTAT   0.4 mg, Sublingual, Every 5 Minutes PRN, Take no more than 3 doses in 15 minutes.       pantoprazole 40 MG EC tablet  Commonly known as:  PROTONIX   40 mg, Oral, Every Morning      PROBIOTIC ACIDOPHILUS capsule   1 tablet/day, Oral, Daily      rosuvastatin 20 MG tablet  Commonly known as:  CRESTOR   10 mg, Oral, Nightly         Stop These Medications    aspirin 81 MG tablet     Chlorhexidine Gluconate Cloth 2 % pads     MULTIVITAMIN ADULT PO     mupirocin 2 % nasal ointment  Commonly known as:  BACTROBAN     TYLENOL ARTHRITIS PAIN 650 MG 8 hr tablet  Generic drug:  acetaminophen            Activity at Discharge:     Discharge Instructions: Patient is to continue with physical therapy exercises daily and continue working with the physical therapist as ordered. Patient may weight bear as tolerated. Apply ice regularly. Patient may ice for long periods of time as long as ice is not directly on the skin. Patient instructed on frequent calf pumping exercises.  Patient also instructed on incentive spirometer during hospitalization and encouraged to continue to use at home regularly.    Dressing: The dressing is designed to be left in place until you return to the office in 2 weeks.  The suction unit should stop functioning at 7 days and the green light will switch to yellow.  At that point the suction unit and tubing can be disconnected at the port closest to the dressing.  The suction unit and tubing may be discarded.  You may shower immediately upon return home, you will need to turn the pump off by depressing the orange button once and then you may disconnect the pump and tubing at the connection port.  After showering, shake off the excess water and reattach the tubing.  Restart the pump by depressing the orange button one time and you will notice the green light flashing again.  If the dressing becomes disloged or saturated it should be changed. Please refer to the K[employer]  information sheet if you have any questions about the dressing.  If you have a home health nurse or therapist they can be contacted to assist with dressing change or repair. You may also call the [facility] dressing hotline for questions related to the dressing ([phone]). If there still other problems or questions related to the dressing despite these measures then you can contact [facility] at our office [phone].  If for some reason the KRYSTINA dressing is removed, after 7 days the wound can be gently cleaned with antibacterial soap then allowed to dry and covered with a dry sterile dressing. The wound should be covered at all times except while showering.  Patient may change dressings daily and prn using sterile 4x4 and paper tape, and should call if any unusual drainage, redness or swelling.*  Follow up appointment in 2 weeks - patient to call the office at [phone] to schedule.  Patient will be discharged on coumadin as directed to keep INR 2-3.  Please draw PT / INR in 2 days and call to office during business hours at [phone].    Discharge Diagnosis:    Patient Active Problem List   Diagnosis   • Complete tear of left rotator cuff   • Impingement syndrome, shoulder   • Primary localized osteoarthrosis of right lower leg   • Tear of medial meniscus of right knee, current   • Chondromalacia of right knee   • Tear of medial meniscus of right knee   • Chondromalacia, left knee   • Arthritis of both knees   • Primary osteoarthritis of right knee   • Primary osteoarthritis of left knee   • Sleep apnea- CPAP   • Hypertension   • Factor 5 Leiden mutation, heterozygous   • Diabetes mellitus type 2, controlled        Follow-up Appointments  Future Appointments   Date Time Provider Department [place]   3/5/2019  3:30 PM Seth Floyd MD [facility] None     Additional Instructions for the Follow-ups that You Need to Schedule     Referral to home health   As directed      PT to see for Home PT protocol. Daily for first week  then 3x/ wk for second week. Staples to be removed at f/u appointment in 2 weeks.    Order Comments:  PT to see for Home PT protocol. Daily for first week then 3x/ wk for second week. Staples to be removed at f/u appointment in 2 weeks.     Face to Face Visit Date:  2/14/2019    Follow-up Provider for Plan of Care?:  I will be treating the patient on an ongoing basis.  Please send me the Plan of Care for signature.    Follow-up Provider:  NANDO FLOYD [[id number]]    Reason/Clinical Findings:  post op knee replacement    Describe mobility limitations that make leaving home difficult:  pain, swelling, weakness, limited mobility, difficulty ambulating without assistive device    Nursing/Therapeutic Services Requested:  Physicial Therapy Skilled Nursing    Skilled nursing orders:  Other (draw PT INR in 2 days and call result to [phone])                  Nando Floyd MD  02/14/19  8:19 AM

## 2019-02-14 NOTE — PROGRESS NOTES
Continued Stay Note  [facility]     Patient Name: Robin Karimi  MRN: [id number]  Today's Date: 2/14/2019    Admit Date: 2/12/2019    Discharge Plan     Row Name 02/14/19 1538       Plan    Final Discharge Disposition Code  06 - home with home health care    Final Note  Pt d/c'ed home with [facility] and [facility] for PT.        Discharge Codes    No documentation.       Expected Discharge Date and Time     Expected Discharge Date Expected Discharge Time    Feb 14, 2019             Allyssa Santa RN

## 2019-02-14 NOTE — PLAN OF CARE
Problem: Patient Care Overview  Goal: Plan of Care Review   02/14/19 3697   Plan of Care Review   Progress improving   OTHER   Outcome Summary Progressing towards goals as expected. Ambulated 90' SV with RW today and tolerated progression of exs protocol. From a PT standpoint, okay to discharge home with  PT and assist.   Coping/Psychosocial   Plan of Care Reviewed With patient

## 2019-02-14 NOTE — PLAN OF CARE
Problem: Patient Care Overview  Goal: Plan of Care Review  Outcome: Ongoing (interventions implemented as appropriate)   02/14/19 7443   Plan of Care Review   Progress improving   OTHER   Outcome Summary BG remains elevated. SSI and Lantus given. Ambulating well with no assistance. VSS. PO pain meds controlling pain. Plans tod/c home today.   Coping/Psychosocial   Plan of Care Reviewed With patient       Problem: Fall Risk (Adult)  Goal: Absence of Fall  Outcome: Ongoing (interventions implemented as appropriate)      Problem: Diabetes, Type 2 (Adult)  Goal: Signs and Symptoms of Listed Potential Problems Will be Absent, Minimized or Managed (Diabetes, Type 2)  Outcome: Ongoing (interventions implemented as appropriate)         Patent

## 2019-02-14 NOTE — PROGRESS NOTES
"DAILY PROGRESS NOTE  [facility]    Patient Identification:  Name: Robin Karimi  Age: 73 y.o.  Sex: male  :  1945  MRN: [id number]         Primary Care Physician: Tim Corey MD    Subjective:  Interval History:He complains of pain    Objective:    Scheduled Meds:    amLODIPine 5 mg Oral BID   docusate sodium 100 mg Oral BID   enoxaparin 30 mg Subcutaneous Q12H   glipiZIDE 10 mg Oral BID AC   insulin glargine 30 Units Subcutaneous Nightly   insulin lispro 0-7 Units Subcutaneous 4x Daily With Meals & Nightly   losartan-HCTZ (HYZAAR) 50-12.5 combo dose  Oral Daily   loteprednol 1 drop Both Eyes Daily   meloxicam 15 mg Oral Daily   metoprolol tartrate 25 mg Oral Q12H   mupirocin  Each Nare BID   pantoprazole 40 mg Oral QAM   polyethylene glycol 17 g Oral BID   warfarin 4 mg Oral Daily     Continuous Infusions:     Vital signs in last 24 hours:  Temp:  [97.4 °F (36.3 °C)-99.5 °F (37.5 °C)] 97.4 °F (36.3 °C)  Heart Rate:  [57-70] 70  Resp:  [16] 16  BP: ()/(50-75) 126/75    Intake/Output:    Intake/Output Summary (Last 24 hours) at 2019 1032  Last data filed at 2019 0905  Gross per 24 hour   Intake 1200 ml   Output 400 ml   Net 800 ml       Exam:  /75 (BP Location: Left arm, Patient Position: Lying)   Pulse 70   Temp 97.4 °F (36.3 °C) (Oral)   Resp 16   Ht 180.3 cm (71\")   Wt 91.3 kg (201 lb 5 oz)   SpO2 96%   BMI 28.08 kg/m²     General Appearance:    Alert, cooperative, no distress   Head:    Normocephalic, without obvious abnormality, atraumatic   Eyes:       Throat:   Lips, tongue, gums normal   Neck:   Supple, symmetrical, trachea midline, no JVD   Lungs:     Clear to auscultation bilaterally, respirations unlabored   Chest Wall:    No tenderness or deformity    Heart:    Regular rate and rhythm, S1 and S2 normal, no murmur,no  Rub or gallop   Abdomen:     Soft, non-tender, bowel sounds active, no masses, no organomegaly    Extremities:   Extremities " normal, atraumatic, no cyanosis or edema   Pulses:      Skin:   Skin is warm and dry,  no rashes or palpable lesions   Neurologic:   no focal deficits noted      Lab Results (last 72 hours)     Procedure Component Value Units Date/Time    POC Glucose Once [[id number]]  (Abnormal) Collected:  02/13/19 0510    Specimen:  Blood Updated:  02/13/19 0514     Glucose 270 mg/dL     Hemoglobin A1c [[id number]]  (Abnormal) Collected:  02/13/19 0327    Specimen:  Blood Updated:  02/13/19 0410     Hemoglobin A1C 6.70 %     Narrative:       Hemoglobin A1C Ranges:    Increased Risk for Diabetes  5.7% to 6.4%  Diabetes                     >= 6.5%  Diabetic Goal                < 7.0%    Comprehensive Metabolic Panel [[id number]]  (Abnormal) Collected:  02/13/19 0327    Specimen:  Blood Updated:  02/13/19 0409     Glucose 308 mg/dL      BUN 21 mg/dL      Creatinine 1.25 mg/dL      Sodium 135 mmol/L      Potassium 4.3 mmol/L      Chloride 97 mmol/L      CO2 25.9 mmol/L      Calcium 9.0 mg/dL      Total Protein 6.6 g/dL      Albumin 3.90 g/dL      ALT (SGPT) 44 U/L      AST (SGOT) 61 U/L      Alkaline Phosphatase 53 U/L      Total Bilirubin 1.4 mg/dL      eGFR Non African Amer 57 mL/min/1.73      Globulin 2.7 gm/dL      A/G Ratio 1.4 g/dL      BUN/Creatinine Ratio 16.8     Anion Gap 12.1 mmol/L     Narrative:       The MDRD GFR formula is only valid for adults with stable renal function between ages 18 and 70.    Protime-INR [[id number]]  (Abnormal) Collected:  02/13/19 0327    Specimen:  Blood Updated:  02/13/19 0356     Protime 14.5 Seconds      INR 1.15    Hemoglobin & Hematocrit, Blood [[id number]]  (Normal) Collected:  02/13/19 0327    Specimen:  Blood Updated:  02/13/19 0346     Hemoglobin 13.1 g/dL      Hematocrit 39.1 %     POC Glucose Once [[id number]]  (Abnormal) Collected:  02/12/19 2133    Specimen:  Blood Updated:  02/12/19 2145     Glucose 381 mg/dL     Protime-INR [[id number]]  (Abnormal) Collected:  02/12/19 1940    Specimen:   Blood Updated:  02/12/19 2011     Protime 14.5 Seconds      INR 1.15    POC Glucose Once [[id number]]  (Abnormal) Collected:  02/12/19 1218    Specimen:  Blood Updated:  02/12/19 1224     Glucose 153 mg/dL     POC Glucose Once [[id number]]  (Abnormal) Collected:  02/12/19 0942    Specimen:  Blood Updated:  02/12/19 0944     Glucose 173 mg/dL         Data Review:  Results from last 7 days   Lab Units 02/13/19  0327   SODIUM mmol/L 135*   POTASSIUM mmol/L 4.3   CHLORIDE mmol/L 97*   CO2 mmol/L 25.9   BUN mg/dL 21   CREATININE mg/dL 1.25   GLUCOSE mg/dL 308*   CALCIUM mg/dL 9.0     Results from last 7 days   Lab Units 02/14/19  0333 02/13/19  0327   HEMOGLOBIN g/dL 12.8* 13.1   HEMATOCRIT % 38.7 39.1         Results from last 7 days   Lab Units 02/13/19  0327   HEMOGLOBIN A1C % 6.70*     No results found for: TROPONINT      Results from last 7 days   Lab Units 02/13/19  0327   ALK PHOS U/L 53   BILIRUBIN mg/dL 1.4*   ALT (SGPT) U/L 44*   AST (SGOT) U/L 61*         Results from last 7 days   Lab Units 02/13/19  0327   HEMOGLOBIN A1C % 6.70*     Glucose   Date/Time Value Ref Range Status   02/14/2019 0621 203 (H) 70 - 130 mg/dL Final   02/13/2019 2126 222 (H) 70 - 130 mg/dL Final   02/13/2019 1546 172 (H) 70 - 130 mg/dL Final   02/13/2019 1301 164 (H) 70 - 130 mg/dL Final   02/13/2019 0510 270 (H) 70 - 130 mg/dL Final   02/12/2019 2133 381 (H) 70 - 130 mg/dL Final   02/12/2019 1218 153 (H) 70 - 130 mg/dL Final   02/12/2019 0942 173 (H) 70 - 130 mg/dL Final     Results from last 7 days   Lab Units 02/14/19  0333 02/13/19 0327 02/12/19 1940   INR  1.46* 1.15* 1.15*       Past Medical History:   Diagnosis Date   • Asthma    • Coronary artery disease    • Diabetes mellitus (CMS/HCC)    • Diverticulosis of colon    • Factor 5 Leiden mutation, heterozygous (CMS/HCC)    • GERD (gastroesophageal reflux disease)    • H/O cornea transplant    • Hip pain, chronic, left    • History of skin cancer    • History of stomach ulcers     • Hypertension    • IBS (irritable bowel syndrome)    • Infectious viral hepatitis     CHILDHOOD   • Joint pain    • Knee pain, bilateral    • Numbness or tingling     LEFT HAND   • PONV (postoperative nausea and vomiting)    • Ringing in ears    • Sleep apnea     CPAP   • Stiffness in joint        Assessment:  Active Hospital Problems    Diagnosis Date Noted   • **Primary osteoarthritis of right knee [M17.11] 01/04/2019   • Sleep apnea- CPAP [G47.30] 02/13/2019   • Hypertension [I10] 02/13/2019   • Factor 5 Leiden mutation, heterozygous [D68.51] 02/13/2019   • Diabetes mellitus type 2, controlled  [E11.9] 02/13/2019   • Primary osteoarthritis of left knee [M17.12] 01/04/2019      Resolved Hospital Problems   No resolved problems to display.       Plan:  Continue with current RX and sliding scale insulin.  Restart metformin.  OK with DC plans.    Sony Lopez MD  2/14/2019  10:32 AM

## 2019-02-14 NOTE — NURSING NOTE
Spoke to Laurel at Dr. Floyd's office to verify if lovenox 2 doses at d/c; she said yes to bring pt's INR down.

## 2019-02-14 NOTE — THERAPY DISCHARGE NOTE
Acute Care - Physical Therapy Treatment Note/Discharge  [facility]     Patient Name: Robin Karimi  : 1945  MRN: [id number]  Today's Date: 2019  Onset of Illness/Injury or Date of Surgery: 19     Referring Physician: Everett    Admit Date: 2019    Visit Dx:    ICD-10-CM ICD-9-CM   1. Difficulty walking R26.2 719.7   2. Primary osteoarthritis of left knee M17.12 715.16   3. Primary osteoarthritis of right knee M17.11 715.16     Patient Active Problem List   Diagnosis   • Complete tear of left rotator cuff   • Impingement syndrome, shoulder   • Primary localized osteoarthrosis of right lower leg   • Tear of medial meniscus of right knee, current   • Chondromalacia of right knee   • Tear of medial meniscus of right knee   • Chondromalacia, left knee   • Arthritis of both knees   • Primary osteoarthritis of right knee   • Primary osteoarthritis of left knee   • Sleep apnea- CPAP   • Hypertension   • Factor 5 Leiden mutation, heterozygous   • Diabetes mellitus type 2, controlled        Physical Therapy Education     Title: PT OT SLP Therapies (In Progress)     Topic: Physical Therapy (In Progress)     Point: Mobility training (In Progress)     Learning Progress Summary           Patient Acceptance, E, NR by MA at 2019  1:26 PM    Acceptance, E, NR by NICOLA at 2019 10:47 AM                   Point: Home exercise program (In Progress)     Learning Progress Summary           Patient Acceptance, E, NR by MA at 2019  1:26 PM    Acceptance, E, NR by EM at 2019 10:47 AM                   Point: Body mechanics (In Progress)     Learning Progress Summary           Patient Acceptance, E, NR by MA at 2019  1:26 PM                   Point: Precautions (In Progress)     Learning Progress Summary           Patient Acceptance, E, NR by MA at 2019  1:26 PM                               User Key     Initials Effective Dates Name Provider Type Discipline    NICOLA 18 -  Jordan  Tana NOVAK, PT Physical Therapist PT    MA 04/03/18 -  Merissa Gongora, PT Physical Therapist PT                Therapy Treatment  Rehabilitation Treatment Summary     Row Name 02/14/19 1324             Treatment Time/Intention    Discipline  physical therapist  -MA      Document Type  therapy note (daily note)  -MA      Subjective Information  complains of;pain  -MA      Mode of Treatment  group therapy  -MA      Patient/Family Observations  UIC in gym  -MA      Care Plan Review  patient/other agree to care plan  -MA      Therapy Frequency (PT Clinical Impression)  2 times/day  -MA      Patient Effort  good  -MA      Existing Precautions/Restrictions  fall  -MA      Recorded by [MA] Merissa Gongora, PT 02/14/19 1325      Row Name 02/14/19 1324             Cognitive Assessment/Intervention- PT/OT    Orientation Status (Cognition)  oriented x 4  -MA      Follows Commands (Cognition)  WNL  -MA      Personal Safety Interventions  fall prevention program maintained;gait belt;nonskid shoes/slippers when out of bed  -MA      Recorded by [MA] Merissa Gongora, PT 02/14/19 1325      Row Name 02/14/19 1324             Mobility Assessment/Intervention    Extremity Weight-bearing Status  left lower extremity  -MA      Left Lower Extremity (Weight-bearing Status)  weight-bearing as tolerated (WBAT)  -MA      Recorded by [MA] Merissa Gongora, PT 02/14/19 1325      Row Name 02/14/19 1324             Bed Mobility Assessment/Treatment    Comment (Bed Mobility)  NT- UIC  -MA      Recorded by [MA] Merissa Gongora, PT 02/14/19 1325      Row Name 02/14/19 1324             Sit-Stand Transfer    Sit-Stand Lucas (Transfers)  supervision  -MA      Assistive Device (Sit-Stand Transfers)  walker, front-wheeled  -MA      Recorded by [MA] Merissa Gongora, PT 02/14/19 1325      Row Name 02/14/19 1324             Stand-Sit Transfer    Stand-Sit Lucas (Transfers)  supervision  -MA      Assistive Device  (Stand-Sit Transfers)  walker, front-wheeled  -MA      Recorded by [MA] Merissa Gongora, PT 02/14/19 1325      Row Name 02/14/19 1324             Gait/Stairs Assessment/Training    [place] Level (Gait)  stand by assist;supervision;verbal cues  -MA      Assistive Device (Gait)  walker, front-wheeled  -MA      Distance in Feet (Gait)  90  -MA      Pattern (Gait)  step-through  -MA      Deviations/Abnormal Patterns (Gait)  antalgic;teo decreased  -MA      Bilateral Gait Deviations  forward flexed posture  -MA      Left Sided Gait Deviations  heel strike decreased  -MA      Recorded by [MA] Merissa Gongora, PT 02/14/19 1325      Row Name 02/14/19 1324             General ROM    GENERAL ROM COMMENTS  L knee AAROM 7-91'  -MA      Recorded by [MA] Merissa Gongora, PT 02/14/19 1325      Row Name 02/14/19 1324             Therapeutic Exercise    Comment (Therapeutic Exercise)  TKA protocol x 25 reps  -MA      Recorded by [MA] Merissa Gongora, PT 02/14/19 1325      Row Name 02/14/19 1324             Positioning and Restraints    Pre-Treatment Position  sitting in chair/recliner  -MA      Post Treatment Position  chair  -MA      In Chair  sitting;call light within reach;encouraged to call for assist;legs elevated  -MA      Recorded by [MA] Merissa Gongora, PT 02/14/19 1325      Row Name 02/14/19 1324             Pain Scale: Numbers Pre/Post-Treatment    Pain Scale: Numbers, Pretreatment  8/10  -MA      Pain Scale: Numbers, Post-Treatment  8/10  -MA      Pain Location - Side  Left  -MA      Pain Location - Orientation  incisional  -MA      Pain Location  knee  -MA      Pain Intervention(s)  Repositioned;Ambulation/increased activity;Rest  -MA      Recorded by [MA] Merissa Gongora, PT 02/14/19 1325      Row Name                Wound 02/12/19 1435 Left knee incision    Wound - Properties Group Date first assessed: 02/12/19 [EG] Time first assessed: 1435 [EG] Side: Left [EG] Location: knee [EG]  Type: incision [EG] Recorded by:  [EG] Tana Klein RN 02/12/19 1435    Row Name 02/14/19 1324             Plan of Care Review    Plan of Care Reviewed With  patient  -MA      Recorded by [MA] Merissa Gongora, PT 02/14/19 1325      Row Name 02/14/19 1324             Outcome Summary/Treatment Plan (PT)    Daily Summary of Progress (PT)  progress toward functional goals as expected;prepare for discharge  -MA      Recorded by [MA] Merissa Gongora, PT 02/14/19 1325        User Key  (r) = Recorded By, (t) = Taken By, (c) = Cosigned By    Initials Name Effective Dates Discipline    Merissa Quintana, PT 04/03/18 -  PT    EG Tana Klein RN 07/10/17 -  Nurse        Wound 02/12/19 1435 Left knee incision (Active)   Dressing Appearance dry;intact;no drainage 2/14/2019  8:10 AM   Closure NICHELLE 2/14/2019  8:10 AM   Base dressing in place, unable to visualize 2/14/2019  8:10 AM   Drainage Amount none 2/14/2019  8:10 AM   Dressing Care, Wound other (see comments) 2/14/2019  8:10 AM       PT Recommendation and Plan  Therapy Frequency (PT Clinical Impression): 2 times/day  Outcome Summary/Treatment Plan (PT)  Daily Summary of Progress (PT): progress toward functional goals as expected, prepare for discharge  Plan of Care Reviewed With: patient  Progress: improving  Outcome Summary: Progressing towards goals as expected. Ambulated 90' SV with RW today and tolerated progression of exs protocol. From a PT standpoint, okay to discharge home with  PT and assist.    Outcome Measures     Row Name 02/14/19 1300 02/13/19 1000          How much help from another person do you currently need...    Turning from your back to your side while in flat bed without using bedrails?  4  -MA  4  -EM     Moving from lying on back to sitting on the side of a flat bed without bedrails?  4  -MA  4  -EM     Moving to and from a bed to a chair (including a wheelchair)?  4  -MA  3  -EM     Standing up from a chair using your arms  (e.g., wheelchair, bedside chair)?  4  -MA  3  -EM     Climbing 3-5 steps with a railing?  3  -MA  3  -EM     To walk in hospital room?  4  -MA  3  -EM     AM-PAC 6 Clicks Score  23  -MA  20  -EM        Functional Assessment    Outcome Measure Options  AM-PAC 6 Clicks Basic Mobility (PT)  -MA  AM-PAC 6 Clicks Basic Mobility (PT)  -EM       User Key  (r) = Recorded By, (t) = Taken By, (c) = Cosigned By    Initials Name Provider Type    Tana George, PT Physical Therapist    Merissa Quintana, PT Physical Therapist           Time Calculation:   PT Charges     Row Name 02/14/19 1323             Time Calculation    Start Time  0830  -MA      Stop Time  0930  -MA      Time Calculation (min)  60 min  -MA      PT Received On  02/14/19  -MA         Time Calculation- PT    Total Timed Code Minutes- PT  20 minute(s)  -MA        User Key  (r) = Recorded By, (t) = Taken By, (c) = Cosigned By    Initials Name Provider Type    Merissa Quintana, PT Physical Therapist        Therapy Suggested Charges     Code   Minutes Charges    None             Therapy Charges for Today     Code Description Service Date Service Provider Modifiers Qty    17242934540 HC PT THER PROC EA 15 MIN 2/14/2019 Merissa Gongora, PT GP 1    28373555128 HC PT THER SUPP EA 15 MIN 2/14/2019 Merissa Gongora, PT GP 1    64407374721 HC PT THER PROC GROUP 2/14/2019 Merissa Gongora, PT GP 1          PT G-Codes  Outcome Measure Options: AM-PAC 6 Clicks Basic Mobility (PT)  AM-PAC 6 Clicks Score: 23         Merissa Gongora PT  2/14/2019

## 2019-02-15 DIAGNOSIS — Z96.659 STATUS POST TOTAL KNEE REPLACEMENT, UNSPECIFIED LATERALITY: Primary | ICD-10-CM

## 2019-02-15 RX ORDER — PROMETHAZINE HYDROCHLORIDE 12.5 MG/1
12.5 TABLET ORAL EVERY 6 HOURS PRN
Qty: 28 TABLET | Refills: 0 | Status: SHIPPED | OUTPATIENT
Start: 2019-02-15 | End: 2020-02-20

## 2019-02-15 NOTE — TELEPHONE ENCOUNTER
RBB post op. Wife called stating patient had a behind the ear patch for nausea while in the hospital and went home with Zofran. The Zofran is not working and they are asking for the patch again.  [facility] in chart.

## 2019-02-15 NOTE — TELEPHONE ENCOUNTER
I don't have any experience prescribing that patch.  I do not feel comfortable doing so.  I suggested that we try switching them to Phenergan.  Let's send in a prescription for Phenergan.  Thank you.

## 2019-02-18 ENCOUNTER — TELEPHONE (OUTPATIENT)
Dept: ORTHOPEDIC SURGERY | Facility: CLINIC | Age: 74
End: 2019-02-18

## 2019-02-18 RX ORDER — TRAMADOL HYDROCHLORIDE 50 MG/1
50 TABLET ORAL EVERY 4 HOURS PRN
Qty: 60 TABLET | Refills: 0 | Status: SHIPPED | OUTPATIENT
Start: 2019-02-18 | End: 2020-02-20

## 2019-02-18 NOTE — TELEPHONE ENCOUNTER
Pro time today is 22.7 INR is 1.9.  Will continue his Coumadin at 3 mg daily and recheck a pro time again on Thursday per RBB

## 2019-02-18 NOTE — TELEPHONE ENCOUNTER
Nehal, patient's daughter, called back questioning why if the nausea patch worked so well, why are we making so many medication changes?  She kindly asked if you could please call and talk to her Mom, Charo Karimi, at [phone].  I explained that you were out of the office for the rest of today.

## 2019-02-18 NOTE — TELEPHONE ENCOUNTER
Spoke with patient's daughter, Rody Painter.  Relayed info from AMB and told her that tramadol and phenergen have been sent to pharmacy.

## 2019-02-18 NOTE — TELEPHONE ENCOUNTER
Spoke with home health nurse.  She relayed that the patient cannot keep anything down.  As per AMB, she recommended that patient stop NORCO.  See if [facility] can prescribe tramadol.  Patient should take phenergen.  Patient should also drink clear liquids until nausea goes away.

## 2019-02-19 NOTE — TELEPHONE ENCOUNTER
Call returned to the patient's daughter.  He was having some problems with nausea and not able to keep anything down yesterday.  They were asking about a scopolamine patch.  I have advised them that the scopolamine patch is something that we used during surgery but usually do not send people home with it.  Plan to him that we need to treat his nausea but we also need to determine the cause of the nausea.  For that reason his pain medicine was changed to tramadol and he was given Phenergan for nausea since the Zofran was not helping.  The daughter reports that his nausea has almost completely resolved today and he is better.  Will have him continue with liquids until his nausea resolves and then he can slowly start adding in solid food.  Have left it open for them to call if they have any other questions or concerns

## 2019-02-25 ENCOUNTER — TELEPHONE (OUTPATIENT)
Dept: ORTHOPEDIC SURGERY | Facility: CLINIC | Age: 74
End: 2019-02-25

## 2019-02-25 NOTE — TELEPHONE ENCOUNTER
Spoke with the nurse at [facility].  Unfortunately his pro time from last Thursday did not get scheduled therefore was not drawn.  Have asked them to draw it first thing today today if possible but since it is 4:30 in the afternoon they can wait first thing tomorrow morning

## 2019-02-26 ENCOUNTER — TELEPHONE (OUTPATIENT)
Dept: ORTHOPEDIC SURGERY | Facility: CLINIC | Age: 74
End: 2019-02-26

## 2019-02-26 NOTE — TELEPHONE ENCOUNTER
Protime today is 25.9, INR is 2.2.  Will continue his Coumadin at 3mg daily.  Will recheck protime again next Monday.  Staples to be removed at the time of his OV.  Have left message for HH nurse to let me know where and when he is starting OP PT. Will need to arrange for OP protimes

## 2019-03-04 ENCOUNTER — TELEPHONE (OUTPATIENT)
Dept: ORTHOPEDIC SURGERY | Facility: CLINIC | Age: 74
End: 2019-03-04

## 2019-03-04 NOTE — TELEPHONE ENCOUNTER
Pro time today is 23.2 INR is 1.9.  Will continue his Coumadin at 3 mg daily and recheck his pro time again on Thursday.  Patient is scheduled to see RBB tomorrow and will need to make arrangements for outpatient protimes if he does continue him on Coumadin

## 2019-03-05 ENCOUNTER — OFFICE VISIT (OUTPATIENT)
Dept: ORTHOPEDIC SURGERY | Facility: CLINIC | Age: 74
End: 2019-03-05

## 2019-03-05 VITALS — BODY MASS INDEX: 28.14 KG/M2 | TEMPERATURE: 98.2 F | WEIGHT: 201 LBS | HEIGHT: 71 IN

## 2019-03-05 DIAGNOSIS — Z96.652 HISTORY OF TOTAL LEFT KNEE REPLACEMENT: Primary | ICD-10-CM

## 2019-03-05 PROCEDURE — 99024 POSTOP FOLLOW-UP VISIT: CPT | Performed by: ORTHOPAEDIC SURGERY

## 2019-03-05 RX ORDER — WARFARIN SODIUM 3 MG/1
3 TABLET ORAL NIGHTLY
Qty: 21 TABLET | Refills: 0 | Status: SHIPPED | OUTPATIENT
Start: 2019-03-05 | End: 2019-04-04

## 2019-03-05 RX ORDER — HYDROCODONE BITARTRATE AND ACETAMINOPHEN 7.5; 325 MG/1; MG/1
1-2 TABLET ORAL EVERY 4 HOURS PRN
Qty: 50 TABLET | Refills: 0 | Status: SHIPPED | OUTPATIENT
Start: 2019-03-05 | End: 2019-04-03 | Stop reason: SDUPTHER

## 2019-03-05 NOTE — PROGRESS NOTES
Robin Karimi : 1945 MRN: [id number] DATE: 3/5/2019    DIAGNOSIS: 2 week follow up left total knee      SUBJECTIVE:Patient returns today for 2 week follow up of left total knee replacement. Patient reports doing well with no unusual complaints. Appears to be progressing appropriately.    OBJECTIVE:   Exam:. The incision is healing appropriately. No sign of infection. Range of motion is progressing as expected. The calf is soft and nontender with a negative Homans sign.    ASSESSMENT: 2 week status post left knee replacement.    PLAN: 1) Staples removed and steri strips applied   2) Order given for PT   3) Discontinue JEROME hose   4) Continue ice PRN   5) warfarin for 3 weeks   6) Follow up in 6 weeks with repeat Xrays of left knee (3views)    Seth Floyd MD  3/5/2019

## 2019-03-08 ENCOUNTER — TELEPHONE (OUTPATIENT)
Dept: ORTHOPEDIC SURGERY | Facility: CLINIC | Age: 74
End: 2019-03-08

## 2019-03-08 NOTE — TELEPHONE ENCOUNTER
The patient.  He was given an order sheet at the time of his office visit by Dr. Floyd for pro time every Monday x3 more weeks.  Patient plans to go to the outpatient lab there at [facility] in [place]

## 2019-03-11 ENCOUNTER — TELEPHONE (OUTPATIENT)
Dept: ORTHOPEDIC SURGERY | Facility: CLINIC | Age: 74
End: 2019-03-11

## 2019-03-11 ENCOUNTER — HOSPITAL ENCOUNTER (OUTPATIENT)
Dept: LAB | Facility: HOSPITAL | Age: 74
Discharge: HOME | End: 2019-03-11
Attending: ORTHOPAEDIC SURGERY

## 2019-03-11 DIAGNOSIS — Z51.81 ENCOUNTER FOR MONITORING COUMADIN THERAPY: Primary | ICD-10-CM

## 2019-03-11 DIAGNOSIS — Z79.01 ENCOUNTER FOR MONITORING COUMADIN THERAPY: Primary | ICD-10-CM

## 2019-03-11 LAB
INR PPP: 1.8 (ref 2–3)
PROTHROMBIN TIME: 17.2 S (ref 9.4–12)

## 2019-03-11 NOTE — TELEPHONE ENCOUNTER
Pro time today is 17.2 INR is 1.8.  Patient is not at home but I did talk to his wife.  Have advised her to have him take Coumadin 4.5 mg today and then tomorrow resume his Coumadin at 3 mg daily.  Will recheck his pro time again on Monday per RBB

## 2019-03-11 NOTE — TELEPHONE ENCOUNTER
Have spoken with lab.  They do not have an order for the patient's pro time.  Patient does have a prescription that he was given at the time of his last office visit however have gone ahead and faxed a new order to [phone] per RBB

## 2019-03-18 ENCOUNTER — HOSPITAL ENCOUNTER (OUTPATIENT)
Dept: LAB | Facility: HOSPITAL | Age: 74
Discharge: HOME | End: 2019-03-18
Attending: ORTHOPAEDIC SURGERY

## 2019-03-18 ENCOUNTER — TELEPHONE (OUTPATIENT)
Dept: ORTHOPEDIC SURGERY | Facility: CLINIC | Age: 74
End: 2019-03-18

## 2019-03-18 LAB
INR PPP: 1.9 (ref 2–3)
PROTHROMBIN TIME: 18.1 S (ref 9.4–12)

## 2019-03-18 NOTE — TELEPHONE ENCOUNTER
Pro time today is 18.1 INR is 1.9.  Patient has enough Coumadin the last for 10 more days.  Have advised him to finish out his present prescription and then DC his Coumadin.  He does not need any further protimes per RBB

## 2019-04-03 RX ORDER — HYDROCODONE BITARTRATE AND ACETAMINOPHEN 7.5; 325 MG/1; MG/1
1-2 TABLET ORAL EVERY 4 HOURS PRN
Qty: 50 TABLET | Refills: 0 | Status: SHIPPED | OUTPATIENT
Start: 2019-04-03 | End: 2019-04-16

## 2019-04-16 ENCOUNTER — OFFICE VISIT (OUTPATIENT)
Dept: ORTHOPEDIC SURGERY | Facility: CLINIC | Age: 74
End: 2019-04-16

## 2019-04-16 VITALS — WEIGHT: 195 LBS | BODY MASS INDEX: 27.3 KG/M2 | TEMPERATURE: 98 F | HEIGHT: 71 IN

## 2019-04-16 DIAGNOSIS — Z96.652 STATUS POST TOTAL LEFT KNEE REPLACEMENT: Primary | ICD-10-CM

## 2019-04-16 PROCEDURE — 73562 X-RAY EXAM OF KNEE 3: CPT | Performed by: NURSE PRACTITIONER

## 2019-04-16 PROCEDURE — 99024 POSTOP FOLLOW-UP VISIT: CPT | Performed by: NURSE PRACTITIONER

## 2019-04-16 RX ORDER — ASPIRIN 81 MG/1
81 TABLET, CHEWABLE ORAL DAILY
COMMUNITY
End: 2021-04-12

## 2019-04-16 RX ORDER — HYDROCODONE BITARTRATE AND ACETAMINOPHEN 5; 325 MG/1; MG/1
1 TABLET ORAL EVERY 6 HOURS PRN
Qty: 30 TABLET | Refills: 0 | Status: SHIPPED | OUTPATIENT
Start: 2019-04-16 | End: 2020-02-20

## 2019-04-16 NOTE — PROGRESS NOTES
Robin Karimi : 1945 MRN: [id number] DATE: 2019    DIAGNOSIS: 8 week follow up left total knee      SUBJECTIVE:Patient returns today for 8 week follow up of left total knee replacement. Patient reports doing well with no unusual complaints. Appears to be progressing appropriately.    OBJECTIVE:   Exam:. The incision is well healed. No sign of infection. Range of motion is measured at 5 to 110. The calf is soft and nontender with a negative Homans sign. Strength is progressing and the patient is ambulating appropriately.    DIAGNOSTIC STUDIES  Xrays: 3 views of the left knee (AP, lateral, and sunrise) were ordered and reviewed for evaluation of recent knee replacement. They demonstrate a well positioned, well aligned knee replacement without complicating factors noted. In comparison with previous films there has been no change.    ASSESSMENT: 8 week status post left knee replacement.    PLAN: 1) Continue with PT exercises as prescribed   2) Follow up in 3 months    Nel Oliveira, APRN  2019

## 2019-07-16 ENCOUNTER — OFFICE VISIT (OUTPATIENT)
Dept: ORTHOPEDIC SURGERY | Facility: CLINIC | Age: 74
End: 2019-07-16

## 2019-07-16 VITALS — WEIGHT: 200 LBS | HEIGHT: 71 IN | TEMPERATURE: 98.4 F | BODY MASS INDEX: 28 KG/M2

## 2019-07-16 DIAGNOSIS — M17.11 PRIMARY OSTEOARTHRITIS OF RIGHT KNEE: ICD-10-CM

## 2019-07-16 DIAGNOSIS — Z96.652 STATUS POST TOTAL LEFT KNEE REPLACEMENT: Primary | ICD-10-CM

## 2019-07-16 PROCEDURE — 73562 X-RAY EXAM OF KNEE 3: CPT | Performed by: ORTHOPAEDIC SURGERY

## 2019-07-16 PROCEDURE — 99212 OFFICE O/P EST SF 10 MIN: CPT | Performed by: ORTHOPAEDIC SURGERY

## 2019-07-16 NOTE — PROGRESS NOTES
Patient: Robin Karimi  YOB: 1945 74 y.o. male  Medical Record Number: [id number]    Chief Complaints:   Chief Complaint   Patient presents with   • Left Knee - Follow-up, Pain       History of Present Illness:Robin Karimi is a 74 y.o. male who presents for follow-up of both knees left total knee 5 months out from surgery overall doing well still little stiff but does appear to be making some progress pain is minimal he is getting around fine major issue is right medial knee pain he describes a medial stabbing aching pain worse with weightbearing or activity.    Allergies:   Allergies   Allergen Reactions   • Benazepril-Hydrochlorothiazide Cough   • Zocor [Simvastatin] Other (See Comments)     MUSCLE PAIN       Medications:   Current Outpatient Medications   Medication Sig Dispense Refill   • amLODIPine (NORVASC) 10 MG tablet Take 5 mg by mouth 2 (Two) Times a Day.     • aspirin 81 MG chewable tablet Chew 81 mg Daily.     • cetirizine (ZyrTEC) 10 MG tablet Take 10 mg by mouth Daily As Needed.     • cholecalciferol (VITAMIN D3) 1000 units tablet Take 1,000 Units by mouth Daily.     • glipiZIDE (GLUCOTROL) 10 MG tablet Take 10 mg by mouth 2 (Two) Times a Day Before Meals.     • HYDROcodone-acetaminophen (NORCO) 5-325 MG per tablet Take 1 tablet by mouth Every 6 (Six) Hours As Needed for Moderate Pain . 30 tablet 0   • Insulin Detemir (LEVEMIR FLEXPEN SC) Inject 34 Units under the skin Every Evening. 1 time at night      • Lactobacillus (PROBIOTIC ACIDOPHILUS) capsule Take 1 tablet/day by mouth Daily.     • losartan-hydrochlorothiazide (HYZAAR) 50-12.5 MG per tablet Take 1 tablet by mouth Daily.     • loteprednol (LOTEMAX) 0.5 % ophthalmic suspension Administer 1 drop to both eyes Daily.     • metFORMIN (GLUCOPHAGE) 500 MG tablet Take 500 mg by mouth 2 (Two) Times a Day With Meals.     • metoprolol tartrate (LOPRESSOR) 50 MG tablet Take 25 mg by mouth Every 12 (Twelve) Hours.     • nitroglycerin  "(NITROSTAT) 0.4 MG SL tablet Place 0.4 mg under the tongue Every 5 (Five) Minutes As Needed for Chest Pain. Take no more than 3 doses in 15 minutes.     • ondansetron (ZOFRAN) 4 MG tablet Take 1 tablet by mouth Every 6 (Six) Hours As Needed for Nausea or Vomiting for up to 10 doses. 10 tablet 0   • pantoprazole (PROTONIX) 40 MG EC tablet Take 40 mg by mouth Every Morning.  1   • promethazine (PHENERGAN) 12.5 MG tablet Take 1 tablet by mouth Every 6 (Six) Hours As Needed for Nausea or Vomiting. 28 tablet 0   • rosuvastatin (CRESTOR) 20 MG tablet Take 10 mg by mouth Every Night.     • traMADol (ULTRAM) 50 MG tablet Take 1 tablet by mouth Every 4 (Four) Hours As Needed for Moderate Pain . 60 tablet 0     No current facility-administered medications for this visit.          The following portions of the patient's history were reviewed and updated as appropriate: allergies, current medications, past family history, past medical history, past social history, past surgical history and problem list.    Review of Systems:   A 14 point review of systems was performed. All systems negative except pertinent positives/negative listed in HPI above    Physical Exam:   Vitals:    07/16/19 1311   Temp: 98.4 °F (36.9 °C)   TempSrc: Temporal   Weight: 90.7 kg (200 lb)   Height: 180.3 cm (71\")       General: A and O x 3, ASA, NAD    SCLERA:    Normal    DENTITION:   Normal  Knee:  left    ALIGNMENT:     Neutral  ,   Patella  tracks  Midline without crepitance    GAIT:    Nonantalgic    SKIN:    Well healed midline incision, no erythema or fluctuance    RANGE OF MOTION:   3  -  113   DEG    STRENGTH:   5  / 5    LIGAMENTS:    No varus / valgus instability.   No  Flexion   instability.       DISTAL PULSES:    Paplable    DISTAL SENSATION :   Intact    LYMPHATICS:     No   lymphadenopathy    OTHER:     No   Effusion      Calf soft / nontender ,   Negative Beth's sign          Radiology:  Xrays 3views left knee (ap,lateral, sunrise) were " ordered and reviewed for evaluation of knee pain demonstrating advanced varus right  knee  osteoarthritis with bone on bone articulation, subchondral cysts, and periarticular osteophytes and a well positioned left  knee replacement without evidence of wear, loosening or osteolysis  todays xrays were compared to previous xrays and demonstrate no change    Assessment/Plan:  Left total knee doing well needs to keep working on stretching range of motion    Right knee advanced osteoarthritis we will going to require knee replacement in the future for now continue quad strengthening we will see him in February with x-rays of both knees

## 2019-07-30 ENCOUNTER — HOSPITAL ENCOUNTER (OUTPATIENT)
Dept: LAB | Facility: HOSPITAL | Age: 74
Discharge: HOME | End: 2019-07-30

## 2019-07-30 LAB
ALBUMIN SERPL-MCNC: 4.5 G/DL (ref 3.5–5)
ALBUMIN/GLOB SERPL: 1.6 {RATIO} (ref 1.4–2.6)
ALP SERPL-CCNC: 65 U/L (ref 56–155)
ALT SERPL-CCNC: 23 U/L (ref 10–40)
ANION GAP SERPL CALC-SCNC: 16 MMOL/L (ref 8–19)
AST SERPL-CCNC: 18 U/L (ref 15–50)
BILIRUB SERPL-MCNC: 1.37 MG/DL (ref 0.2–1.3)
BUN SERPL-MCNC: 19 MG/DL (ref 5–25)
BUN/CREAT SERPL: 17 {RATIO} (ref 6–20)
CALCIUM SERPL-MCNC: 9.5 MG/DL (ref 8.7–10.4)
CHLORIDE SERPL-SCNC: 101 MMOL/L (ref 99–111)
CHOLEST SERPL-MCNC: 119 MG/DL (ref 107–200)
CHOLEST/HDLC SERPL: 3.6 {RATIO} (ref 3–6)
CONV CO2: 27 MMOL/L (ref 22–32)
CONV TOTAL PROTEIN: 7.4 G/DL (ref 6.3–8.2)
CREAT UR-MCNC: 1.1 MG/DL (ref 0.7–1.2)
EST. AVERAGE GLUCOSE BLD GHB EST-MCNC: 157 MG/DL
GFR SERPLBLD BASED ON 1.73 SQ M-ARVRAT: >60 ML/MIN/{1.73_M2}
GLOBULIN UR ELPH-MCNC: 2.9 G/DL (ref 2–3.5)
GLUCOSE SERPL-MCNC: 153 MG/DL (ref 70–99)
HBA1C MFR BLD: 7.1 % (ref 3.5–5.7)
HDLC SERPL-MCNC: 33 MG/DL (ref 40–60)
LDLC SERPL CALC-MCNC: 69 MG/DL (ref 70–100)
OSMOLALITY SERPL CALC.SUM OF ELEC: 295 MOSM/KG (ref 273–304)
POTASSIUM SERPL-SCNC: 4.1 MMOL/L (ref 3.5–5.3)
SODIUM SERPL-SCNC: 140 MMOL/L (ref 135–147)
TRIGL SERPL-MCNC: 84 MG/DL (ref 40–150)
VLDLC SERPL-MCNC: 17 MG/DL (ref 5–37)

## 2019-09-03 ENCOUNTER — TELEPHONE (OUTPATIENT)
Dept: ORTHOPEDIC SURGERY | Facility: CLINIC | Age: 74
End: 2019-09-03

## 2019-09-03 NOTE — TELEPHONE ENCOUNTER
Spoke with the patient's wife.  He is no longer taking Coumadin therefore does not need any further protimes.  Have notified the lab

## 2019-09-16 ENCOUNTER — HOSPITAL ENCOUNTER (OUTPATIENT)
Dept: FAMILY MEDICINE CLINIC | Facility: CLINIC | Age: 74
Discharge: HOME | End: 2019-09-16
Attending: FAMILY MEDICINE

## 2019-09-16 LAB
ANION GAP SERPL CALC-SCNC: 20 MMOL/L (ref 8–19)
BUN SERPL-MCNC: 19 MG/DL (ref 5–25)
BUN/CREAT SERPL: 16 {RATIO} (ref 6–20)
CALCIUM SERPL-MCNC: 9.5 MG/DL (ref 8.7–10.4)
CHLORIDE SERPL-SCNC: 99 MMOL/L (ref 99–111)
CONV CO2: 26 MMOL/L (ref 22–32)
CREAT UR-MCNC: 1.21 MG/DL (ref 0.7–1.2)
GFR SERPLBLD BASED ON 1.73 SQ M-ARVRAT: 59 ML/MIN/{1.73_M2}
GLUCOSE SERPL-MCNC: 93 MG/DL (ref 70–99)
OSMOLALITY SERPL CALC.SUM OF ELEC: 294 MOSM/KG (ref 273–304)
POTASSIUM SERPL-SCNC: 4.1 MMOL/L (ref 3.5–5.3)
SODIUM SERPL-SCNC: 141 MMOL/L (ref 135–147)

## 2019-10-24 ENCOUNTER — HOSPITAL ENCOUNTER (OUTPATIENT)
Dept: LAB | Facility: HOSPITAL | Age: 74
Discharge: HOME | End: 2019-10-24

## 2019-10-24 LAB
ANION GAP SERPL CALC-SCNC: 22 MMOL/L (ref 8–19)
BUN SERPL-MCNC: 25 MG/DL (ref 5–25)
BUN/CREAT SERPL: 21 {RATIO} (ref 6–20)
CALCIUM SERPL-MCNC: 9.4 MG/DL (ref 8.7–10.4)
CHLORIDE SERPL-SCNC: 103 MMOL/L (ref 99–111)
CHOLEST SERPL-MCNC: 136 MG/DL (ref 107–200)
CHOLEST/HDLC SERPL: 3.6 {RATIO} (ref 3–6)
CONV CO2: 24 MMOL/L (ref 22–32)
CREAT UR-MCNC: 1.18 MG/DL (ref 0.7–1.2)
EST. AVERAGE GLUCOSE BLD GHB EST-MCNC: 154 MG/DL
GFR SERPLBLD BASED ON 1.73 SQ M-ARVRAT: >60 ML/MIN/{1.73_M2}
GLUCOSE SERPL-MCNC: 141 MG/DL (ref 70–99)
HBA1C MFR BLD: 7 % (ref 3.5–5.7)
HDLC SERPL-MCNC: 38 MG/DL (ref 40–60)
LDLC SERPL CALC-MCNC: 79 MG/DL (ref 70–100)
OSMOLALITY SERPL CALC.SUM OF ELEC: 305 MOSM/KG (ref 273–304)
POTASSIUM SERPL-SCNC: 4.5 MMOL/L (ref 3.5–5.3)
SODIUM SERPL-SCNC: 144 MMOL/L (ref 135–147)
TRIGL SERPL-MCNC: 95 MG/DL (ref 40–150)
VLDLC SERPL-MCNC: 19 MG/DL (ref 5–37)

## 2019-11-12 ENCOUNTER — HOSPITAL ENCOUNTER (OUTPATIENT)
Dept: GENERAL RADIOLOGY | Facility: HOSPITAL | Age: 74
Discharge: HOME | End: 2019-11-12
Attending: FAMILY MEDICINE

## 2019-11-18 ENCOUNTER — HOSPITAL ENCOUNTER (OUTPATIENT)
Dept: ULTRASOUND IMAGING | Facility: HOSPITAL | Age: 74
Discharge: HOME | End: 2019-11-18
Attending: INTERNAL MEDICINE

## 2019-12-04 ENCOUNTER — OFFICE VISIT CONVERTED (OUTPATIENT)
Dept: NEUROLOGY | Facility: CLINIC | Age: 74
End: 2019-12-04
Attending: PSYCHIATRY & NEUROLOGY

## 2020-01-10 ENCOUNTER — CONSULT (OUTPATIENT)
Dept: ORTHOPEDIC SURGERY | Facility: CLINIC | Age: 75
End: 2020-01-10

## 2020-01-10 VITALS — BODY MASS INDEX: 28 KG/M2 | TEMPERATURE: 98.3 F | HEIGHT: 71 IN | WEIGHT: 200 LBS

## 2020-01-10 DIAGNOSIS — G56.02 LEFT CARPAL TUNNEL SYNDROME: ICD-10-CM

## 2020-01-10 DIAGNOSIS — M79.642 LEFT HAND PAIN: Primary | ICD-10-CM

## 2020-01-10 PROCEDURE — 99214 OFFICE O/P EST MOD 30 MIN: CPT | Performed by: ORTHOPAEDIC SURGERY

## 2020-01-10 PROCEDURE — 73130 X-RAY EXAM OF HAND: CPT | Performed by: ORTHOPAEDIC SURGERY

## 2020-01-10 RX ORDER — PEN NEEDLE, DIABETIC 29 G X1/2"
NEEDLE, DISPOSABLE MISCELLANEOUS
COMMUNITY
Start: 2019-11-01

## 2020-01-13 RX ORDER — CEFAZOLIN SODIUM 2 G/100ML
2 INJECTION, SOLUTION INTRAVENOUS ONCE
Status: CANCELLED | OUTPATIENT
Start: 2020-02-25 | End: 2020-01-13

## 2020-01-14 ENCOUNTER — HOSPITAL ENCOUNTER (OUTPATIENT)
Dept: SLEEP MEDICINE | Facility: HOSPITAL | Age: 75
Discharge: HOME | End: 2020-01-14
Attending: PSYCHIATRY & NEUROLOGY

## 2020-01-14 NOTE — PROGRESS NOTES
Patient: Robin Karimi    YOB: 1945    Medical Record Number: [id number]    Chief Complaints:  Left hand pain and numbness    History of Present Illness:     74 y.o. male patient who presents for evaluation of the left hand.  His symptoms started over a year ago.  He has a history of diabetic neuropathy in both hands but he reports worsening symptoms over the past year.  He reports moderate to severe constant aching and/or burning pain.  He reports diminished sensation in his thumb, index and middle finger.  He also feels like the hand is weak.  The symptoms are constant at this point.  Both bother him but the left is far worse.  He reports trouble sleeping due to the pain.  He has tried wearing a brace.  It has not helped.    Allergies:   Allergies   Allergen Reactions   • Benazepril-Hydrochlorothiazide Cough   • Zocor [Simvastatin] Other (See Comments)     MUSCLE PAIN       Home Medications:      Current Outpatient Medications:   •  amLODIPine (NORVASC) 10 MG tablet, Take 5 mg by mouth 2 (Two) Times a Day., Disp: , Rfl:   •  aspirin 81 MG chewable tablet, Chew 81 mg Daily., Disp: , Rfl:   •  cetirizine (ZyrTEC) 10 MG tablet, Take 10 mg by mouth Daily As Needed., Disp: , Rfl:   •  cholecalciferol (VITAMIN D3) 1000 units tablet, Take 1,000 Units by mouth Daily., Disp: , Rfl:   •  glipiZIDE (GLUCOTROL) 10 MG tablet, Take 10 mg by mouth 2 (Two) Times a Day Before Meals., Disp: , Rfl:   •  HYDROcodone-acetaminophen (NORCO) 5-325 MG per tablet, Take 1 tablet by mouth Every 6 (Six) Hours As Needed for Moderate Pain ., Disp: 30 tablet, Rfl: 0  •  Insulin Detemir (LEVEMIR FLEXPEN SC), Inject 34 Units under the skin Every Evening. 1 time at night , Disp: , Rfl:   •  Lactobacillus (PROBIOTIC ACIDOPHILUS) capsule, Take 1 tablet/day by mouth Daily., Disp: , Rfl:   •  losartan-hydrochlorothiazide (HYZAAR) 50-12.5 MG per tablet, Take 1 tablet by mouth Daily., Disp: , Rfl:   •  loteprednol (LOTEMAX) 0.5 %  ophthalmic suspension, Administer 1 drop to both eyes Daily., Disp: , Rfl:   •  metFORMIN (GLUCOPHAGE) 500 MG tablet, Take 500 mg by mouth 2 (Two) Times a Day With Meals., Disp: , Rfl:   •  metoprolol tartrate (LOPRESSOR) 50 MG tablet, Take 25 mg by mouth Every 12 (Twelve) Hours., Disp: , Rfl:   •  nitroglycerin (NITROSTAT) 0.4 MG SL tablet, Place 0.4 mg under the tongue Every 5 (Five) Minutes As Needed for Chest Pain. Take no more than 3 doses in 15 minutes., Disp: , Rfl:   •  ondansetron (ZOFRAN) 4 MG tablet, Take 1 tablet by mouth Every 6 (Six) Hours As Needed for Nausea or Vomiting for up to 10 doses., Disp: 10 tablet, Rfl: 0  •  pantoprazole (PROTONIX) 40 MG EC tablet, Take 40 mg by mouth Every Morning., Disp: , Rfl: 1  •  promethazine (PHENERGAN) 12.5 MG tablet, Take 1 tablet by mouth Every 6 (Six) Hours As Needed for Nausea or Vomiting., Disp: 28 tablet, Rfl: 0  •  RELION PEN NEEDLE 31G/8MM 31G X 8 MM misc, USE 1 ONCE DAILY WITH INSULIN INJECTION, Disp: , Rfl:   •  rosuvastatin (CRESTOR) 20 MG tablet, Take 10 mg by mouth Every Night., Disp: , Rfl:   •  traMADol (ULTRAM) 50 MG tablet, Take 1 tablet by mouth Every 4 (Four) Hours As Needed for Moderate Pain ., Disp: 60 tablet, Rfl: 0    Past Medical History:   Diagnosis Date   • Asthma    • Coronary artery disease    • Diabetes mellitus (CMS/HCC)    • Diverticulosis of colon    • Factor 5 Leiden mutation, heterozygous (CMS/HCC)    • GERD (gastroesophageal reflux disease)    • H/O cornea transplant    • Hip pain, chronic, left    • History of skin cancer    • History of stomach ulcers    • Hypertension    • IBS (irritable bowel syndrome)    • Infectious viral hepatitis     CHILDHOOD   • Joint pain    • Knee pain, bilateral    • Numbness or tingling     LEFT HAND   • PONV (postoperative nausea and vomiting)    • Ringing in ears    • Sleep apnea     CPAP   • Stiffness in joint        Past Surgical History:   Procedure Laterality Date   • CHOLECYSTECTOMY     •  COLONOSCOPY     • CORNEAL TRANSPLANT      [facility]   • CORONARY ANGIOPLASTY  2015    and 2011   • KNEE ARTHROSCOPY Right 3/20/2018    Procedure: RIGHT KNEE ARTHROSCOPY, PARTIAL MEDIAL MENISECTOMY, ABRASSSION CHONDROPLASTY, AND LOOSE BODY REMOVAL;  Surgeon: Logan Roblero MD;  [place] [facility] [place] [facility] [place]: [place]opedics   • KNEE SURGERY  06/23/2015   • ROTATOR CUFF REPAIR Left    • TOTAL KNEE ARTHROPLASTY Left 2/12/2019    Procedure: TOTAL KNEE ARTHROPLASTY;  Surgeon: Seth Floyd MD;  Location: [facility] OR;  Service: Orthopedics       Social History     Occupational History   • Not on file   Tobacco Use   • Smoking status: Never Smoker   • Smokeless tobacco: Never Used   Substance and Sexual Activity   • Alcohol use: No   • Drug use: No   • Sexual activity: Defer      Social History     Social History Narrative   • Not on file       Family History   Problem Relation Age of Onset   • Stomach cancer Other    • Diabetes Other    • Heart disease Other    • Hypertension Other    • Malig Hyperthermia Neg Hx        Review of Systems:      Constitutional: Denies fever, shaking or chills   Eyes: Denies change in visual acuity   HEENT: Denies nasal congestion or sore throat   Respiratory: Denies cough or shortness of breath   Cardiovascular: Denies chest pain or edema  Endocrine: Denies tremors, palpitations, intolerance of heat or cold, polyuria, polydipsia.  GI: Denies abdominal pain, nausea, vomiting, bloody stools or diarrhea  : Denies frequency, urgency, incontinence, retention, or nocturia.  Musculoskeletal: Denies numbness tingling or loss of motor function except as above  Integument: Denies rash, lesion or ulceration   Neurologic: Denies headache or focal weakness, deficits  Heme: Denies epistaxis, spontaneous or excessive bleeding, epistaxis, hematuria, melena, fatigue, enlarged or tender lymph nodes.      All other pertinent positives and negatives as noted above in HPI.      Physical Exam: 74 y.o.  "male    Vitals:    01/10/20 1428   Temp: 98.3 °F (36.8 °C)   TempSrc: Temporal   Weight: 90.7 kg (200 lb)   Height: 180.3 cm (71\")       General:  Patient is awake and alert.  Appears in no acute distress or discomfort.    Psych:  Affect and demeanor are appropriate.    Eyes:  Conjunctiva and sclera appear grossly normal.  Eyes track well and EOM seem to be intact.    Ears:  No gross abnormalities.  Hearing adequate for the exam.    Cardiovascular:  Regular rate and rhythm.    Lungs:  Good chest expansion.  Breathing unlabored.    Lymph:  No palpable masses or adenopathy in the left upper extremity    Extremities:  Left upper extremity is examined.  Skin is benign about the hand and wrist.  No atrophy, swelling, or masses.  No palpable adenopathy in the extremity.  Mild tenderness over the carpal tunnel.  Mild tenderness at the base of his thumb and the snuffbox.  Negative thumb grind test.  Positive Phalen's maneuver over the carpal tunnel.  No evident instability of the wrist or thumb.  Good , pinch, finger and thumb abduction strength.  Diminished sensation in the median nerve distribution.  Palpable radial pulse.  Brisk capillary refill.  Good skin turgor.         Radiology:   AP, oblique, and lateral views of the left hand are ordered and reviewed.  No comparison films are immediately available.  He has severe STT osteoarthritis and moderate thumb carpometacarpal osteoarthritis.  There are no lesions, masses, or other concerning findings to account for the patient's symptoms.    Nerve studies of both upper extremities are reviewed along with the associated reports.  He has severe left carpal tunnel syndrome and moderate right carpal tunnel syndrome.  No other significant abnormalities.    Assessment/Plan:  Left carpal tunnel syndrome    We discussed all available treatments for this condition in detail, both surgical and non-surgical.  With regards to conservative treatment, we discussed " anti-inflammatories, injections, and use of a night splint. Given the presence of constant numbness and subjective weakness, I have recommended that he consider surgery to hopefully prevent permanent nerve damage.  He has a history of diabetic neuropathy.  There is neuropathy of the ulnar nerve noted on the nerve study but that seems to be asymptomatic on exam.  I do not recommend any intervention for the ulnar nerve at this time.       We discussed the surgery and all that entails including all risks, benefits, and alternatives.  The risks discussed included infection, wound healing problems, hematoma, persistent pain/numbness/weakness despite the surgery, iatrogenic damage to the median and/or motor recurrent branch of the median nerve which could result in permanent weakness, numbness and dysfunction.  We also talked about positioning related neuropraxia, RSD, DVT, PE and death as well.  No guarrantees were given regarding the results of the procedure.  I did explain that any symptoms that he has related to the diabetic neuropathy will persist after surgery.  The patient has acknowledged understanding and consents to proceed.     Ren March MD    01/10/2020    CC to Tim Corey MD

## 2020-01-15 PROBLEM — G56.02 LEFT CARPAL TUNNEL SYNDROME: Status: ACTIVE | Noted: 2020-01-15

## 2020-01-24 ENCOUNTER — TELEPHONE (OUTPATIENT)
Dept: ORTHOPEDIC SURGERY | Facility: CLINIC | Age: 75
End: 2020-01-24

## 2020-02-20 ENCOUNTER — OFFICE VISIT (OUTPATIENT)
Dept: ORTHOPEDIC SURGERY | Facility: CLINIC | Age: 75
End: 2020-02-20

## 2020-02-20 VITALS — TEMPERATURE: 98.2 F | WEIGHT: 211.2 LBS | BODY MASS INDEX: 29.57 KG/M2 | HEIGHT: 71 IN

## 2020-02-20 DIAGNOSIS — M25.562 ACUTE PAIN OF BOTH KNEES: Primary | ICD-10-CM

## 2020-02-20 DIAGNOSIS — M25.561 ACUTE PAIN OF BOTH KNEES: Primary | ICD-10-CM

## 2020-02-20 DIAGNOSIS — M17.11 PRIMARY OSTEOARTHRITIS OF RIGHT KNEE: ICD-10-CM

## 2020-02-20 PROCEDURE — 99214 OFFICE O/P EST MOD 30 MIN: CPT | Performed by: ORTHOPAEDIC SURGERY

## 2020-02-20 PROCEDURE — 73562 X-RAY EXAM OF KNEE 3: CPT | Performed by: ORTHOPAEDIC SURGERY

## 2020-02-20 RX ORDER — MELOXICAM 15 MG/1
15 TABLET ORAL ONCE
Status: CANCELLED | OUTPATIENT
Start: 2020-05-18 | End: 2020-02-20

## 2020-02-20 RX ORDER — CEFAZOLIN SODIUM 2 G/100ML
2 INJECTION, SOLUTION INTRAVENOUS ONCE
Status: CANCELLED | OUTPATIENT
Start: 2020-05-18 | End: 2020-02-20

## 2020-02-20 RX ORDER — PREGABALIN 75 MG/1
150 CAPSULE ORAL ONCE
Status: CANCELLED | OUTPATIENT
Start: 2020-05-18 | End: 2020-02-20

## 2020-02-20 NOTE — PROGRESS NOTES
Patient: Robin Karimi  YOB: 1945 74 y.o. male  Medical Record Number: [id number]    Chief Complaints:   Chief Complaint   Patient presents with   • Left Knee - Follow-up   • Right Knee - Pain       History of Present Illness:Robin Karimi is a 74 y.o. male who presents for follow-up of  Right medial stabbing knee pain with activity. Markedly progressed over last 6 months. Not improved with conservative measures, pain limiits ADLs.     Allergies:   Allergies   Allergen Reactions   • Benazepril-Hydrochlorothiazide Cough   • Zocor [Simvastatin] Other (See Comments)     MUSCLE PAIN       Medications:   Current Outpatient Medications   Medication Sig Dispense Refill   • amLODIPine (NORVASC) 10 MG tablet Take 5 mg by mouth 2 (Two) Times a Day.     • aspirin 81 MG chewable tablet Chew 81 mg Daily.     • cetirizine (ZyrTEC) 10 MG tablet Take 10 mg by mouth Daily As Needed.     • cholecalciferol (VITAMIN D3) 1000 units tablet Take 1,000 Units by mouth Daily.     • glipiZIDE (GLUCOTROL) 10 MG tablet Take 10 mg by mouth 2 (Two) Times a Day Before Meals.     • Insulin Detemir (LEVEMIR FLEXPEN SC) Inject 34 Units under the skin Every Evening. 1 time at night      • Lactobacillus (PROBIOTIC ACIDOPHILUS) capsule Take 1 tablet/day by mouth Daily.     • losartan-hydrochlorothiazide (HYZAAR) 50-12.5 MG per tablet Take 1 tablet by mouth Daily.     • loteprednol (LOTEMAX) 0.5 % ophthalmic suspension Administer 1 drop to both eyes Daily.     • metFORMIN (GLUCOPHAGE) 500 MG tablet Take 500 mg by mouth 2 (Two) Times a Day With Meals.     • metoprolol tartrate (LOPRESSOR) 50 MG tablet Take 25 mg by mouth Every 12 (Twelve) Hours.     • nitroglycerin (NITROSTAT) 0.4 MG SL tablet Place 0.4 mg under the tongue Every 5 (Five) Minutes As Needed for Chest Pain. Take no more than 3 doses in 15 minutes.     • pantoprazole (PROTONIX) 40 MG EC tablet Take 40 mg by mouth Every Morning.  1   • RELION PEN NEEDLE 31G/8MM 31G X 8 MM  "misc USE 1 ONCE DAILY WITH INSULIN INJECTION     • rosuvastatin (CRESTOR) 20 MG tablet Take 10 mg by mouth Every Night.       No current facility-administered medications for this visit.          The following portions of the patient's history were reviewed and updated as appropriate: allergies, current medications, past family history, past medical history, past social history, past surgical history and problem list.    Review of Systems:   A 14 point review of systems was performed. All systems negative except pertinent positives/negative listed in HPI above    Physical Exam:   Vitals:    02/20/20 1314   Temp: 98.2 °F (36.8 °C)   TempSrc: Temporal   Weight: 95.8 kg (211 lb 3.2 oz)   Height: 180.3 cm (71\")   PainSc:   4   PainLoc: Knee       General: A and O x 3, ASA, NAD    SCLERA:    Normal    DENTITION:   Normal  Knee:  right    ALIGNMENT:     Varus  ,   Patella  tracks  midline    GAIT:    Antalgic    SKIN:    No abnormality    RANGE OF MOTION:   5  -  115   DEG    STRENGTH:   4  / 5    LIGAMENTS:    No varus / valgus instability.   Negative  Lachman.    MENISCUS:     Negative   Melinda       DISTAL PULSES:    Paplable    DISTAL SENSATION :   Intact    LYMPHATICS:     No   lymphadenopathy    OTHER:          - Positive   effusion      - Crepitance with ROM       Radiology:  Xrays 3views both knees  (ap,lateral, sunrise) were ordered and reviewed for evaluation of knee pain demonstratingadvanced varus osteoarthritis with bone on bone articulation, subchondral cysts, and periarticular osteophytes and a well positioned knee replacement without evidence of wear, loosening or osteolysis  todays xrays were compared to previous xrays and show new findings as described above    Assessment/Plan:  R  Knee endstage OA. Failed conservative measures.   Continuation of conservative management vs. TKA discussed.  The patient wishes to proceed with total knee replacement.  At this point the patient has failed the full " compliment of conservative treatment and stating complete understanding of the risks/benefits/ anternatives wishes to proceed with surgical treatment.    Risk and benefits of surgery were reviewed.  Including, but not limited to, blood clots or pulmonary embolism, anesthesia risk, infection, fracture, skin/leg numbness, persistent pain/crepitance/popping/catching, failure of the implant, need for future surgeries, hematoma, possible nerve or blood vessel injury, need for transfusion, and potential risk of stroke,heart attack or death, among others.  The patient understands and wishes to proceed.     It was explained that if tissue has been repaired or reconstructed, there is also an increased chance of failure which may require further management.  Following the completion of the discussion, the patient expressed understanding of this planned course of care, all their questions were answered and consent will be obtained preoperatively.    Operative Plan: right [facility] Total Knee Replacement an overnight staywith home health rehab

## 2020-03-10 ENCOUNTER — HOSPITAL ENCOUNTER (OUTPATIENT)
Dept: FAMILY MEDICINE CLINIC | Facility: CLINIC | Age: 75
Discharge: HOME | End: 2020-03-10
Attending: FAMILY MEDICINE

## 2020-03-10 LAB
ALBUMIN SERPL-MCNC: 4.6 G/DL (ref 3.5–5)
ALBUMIN/GLOB SERPL: 1.6 {RATIO} (ref 1.4–2.6)
ALP SERPL-CCNC: 54 U/L (ref 56–155)
ALT SERPL-CCNC: 31 U/L (ref 10–40)
ANION GAP SERPL CALC-SCNC: 23 MMOL/L (ref 8–19)
AST SERPL-CCNC: 22 U/L (ref 15–50)
BILIRUB SERPL-MCNC: 1.17 MG/DL (ref 0.2–1.3)
BUN SERPL-MCNC: 21 MG/DL (ref 5–25)
BUN/CREAT SERPL: 16 {RATIO} (ref 6–20)
CALCIUM SERPL-MCNC: 9.9 MG/DL (ref 8.7–10.4)
CHLORIDE SERPL-SCNC: 100 MMOL/L (ref 99–111)
CONV CO2: 22 MMOL/L (ref 22–32)
CONV TOTAL PROTEIN: 7.5 G/DL (ref 6.3–8.2)
CREAT UR-MCNC: 1.29 MG/DL (ref 0.7–1.2)
EST. AVERAGE GLUCOSE BLD GHB EST-MCNC: 157 MG/DL
FOLATE SERPL-MCNC: 10.2 NG/ML (ref 4.8–20)
GFR SERPLBLD BASED ON 1.73 SQ M-ARVRAT: 55 ML/MIN/{1.73_M2}
GLOBULIN UR ELPH-MCNC: 2.9 G/DL (ref 2–3.5)
GLUCOSE SERPL-MCNC: 150 MG/DL (ref 70–99)
HBA1C MFR BLD: 7.1 % (ref 3.5–5.7)
OSMOLALITY SERPL CALC.SUM OF ELEC: 296 MOSM/KG (ref 273–304)
POTASSIUM SERPL-SCNC: 4.6 MMOL/L (ref 3.5–5.3)
PSA SERPL-MCNC: 1.95 NG/ML (ref 0–4)
SODIUM SERPL-SCNC: 140 MMOL/L (ref 135–147)
VIT B12 SERPL-MCNC: 841 PG/ML (ref 211–911)

## 2020-03-11 LAB — 25(OH)D3 SERPL-MCNC: 37.7 NG/ML (ref 30–100)

## 2020-09-21 ENCOUNTER — HOSPITAL ENCOUNTER (OUTPATIENT)
Dept: LAB | Facility: HOSPITAL | Age: 75
Discharge: HOME | End: 2020-09-21
Attending: FAMILY MEDICINE

## 2020-09-21 LAB
ALBUMIN SERPL-MCNC: 4.4 G/DL (ref 3.5–5)
ALBUMIN/GLOB SERPL: 1.7 {RATIO} (ref 1.4–2.6)
ALP SERPL-CCNC: 47 U/L (ref 56–155)
ALT SERPL-CCNC: 27 U/L (ref 10–40)
ANION GAP SERPL CALC-SCNC: 17 MMOL/L (ref 8–19)
AST SERPL-CCNC: 19 U/L (ref 15–50)
BILIRUB SERPL-MCNC: 1.35 MG/DL (ref 0.2–1.3)
BUN SERPL-MCNC: 22 MG/DL (ref 5–25)
BUN/CREAT SERPL: 19 {RATIO} (ref 6–20)
CALCIUM SERPL-MCNC: 9.2 MG/DL (ref 8.7–10.4)
CHLORIDE SERPL-SCNC: 102 MMOL/L (ref 99–111)
CONV CO2: 25 MMOL/L (ref 22–32)
CONV TOTAL PROTEIN: 7 G/DL (ref 6.3–8.2)
CREAT UR-MCNC: 1.13 MG/DL (ref 0.7–1.2)
EST. AVERAGE GLUCOSE BLD GHB EST-MCNC: 148 MG/DL
GFR SERPLBLD BASED ON 1.73 SQ M-ARVRAT: >60 ML/MIN/{1.73_M2}
GLOBULIN UR ELPH-MCNC: 2.6 G/DL (ref 2–3.5)
GLUCOSE SERPL-MCNC: 160 MG/DL (ref 70–99)
HBA1C MFR BLD: 6.8 % (ref 3.5–5.7)
OSMOLALITY SERPL CALC.SUM OF ELEC: 297 MOSM/KG (ref 273–304)
POTASSIUM SERPL-SCNC: 4.1 MMOL/L (ref 3.5–5.3)
SODIUM SERPL-SCNC: 140 MMOL/L (ref 135–147)

## 2020-11-04 ENCOUNTER — HOSPITAL ENCOUNTER (OUTPATIENT)
Dept: SLEEP MEDICINE | Facility: HOSPITAL | Age: 75
Discharge: HOME | End: 2020-11-04
Attending: PSYCHIATRY & NEUROLOGY

## 2021-01-28 ENCOUNTER — OFFICE VISIT (OUTPATIENT)
Dept: ORTHOPEDIC SURGERY | Facility: CLINIC | Age: 76
End: 2021-01-28

## 2021-01-28 VITALS — HEIGHT: 71 IN | WEIGHT: 211 LBS | BODY MASS INDEX: 29.54 KG/M2 | TEMPERATURE: 98.2 F

## 2021-01-28 DIAGNOSIS — R52 PAIN: Primary | ICD-10-CM

## 2021-01-28 DIAGNOSIS — M17.11 PRIMARY OSTEOARTHRITIS OF RIGHT KNEE: ICD-10-CM

## 2021-01-28 PROBLEM — M17.9 OA (OSTEOARTHRITIS) OF KNEE: Status: ACTIVE | Noted: 2021-01-28

## 2021-01-28 PROCEDURE — 99214 OFFICE O/P EST MOD 30 MIN: CPT | Performed by: ORTHOPAEDIC SURGERY

## 2021-01-28 PROCEDURE — 73562 X-RAY EXAM OF KNEE 3: CPT | Performed by: ORTHOPAEDIC SURGERY

## 2021-01-28 RX ORDER — PREGABALIN 75 MG/1
150 CAPSULE ORAL ONCE
Status: CANCELLED | OUTPATIENT
Start: 2021-06-14 | End: 2021-01-28

## 2021-01-28 RX ORDER — MELOXICAM 15 MG/1
15 TABLET ORAL ONCE
Status: CANCELLED | OUTPATIENT
Start: 2021-06-14 | End: 2021-01-28

## 2021-01-28 RX ORDER — CEFAZOLIN SODIUM 2 G/100ML
2 INJECTION, SOLUTION INTRAVENOUS ONCE
Status: CANCELLED | OUTPATIENT
Start: 2021-06-14 | End: 2021-01-28

## 2021-01-28 NOTE — PROGRESS NOTES
Patient: Robin Karimi  YOB: 1945 75 y.o. male  Medical Record Number: [id number]    Chief Complaints:   Chief Complaint   Patient presents with   • Right Knee - Follow-up, Pain       History of Present Illness:Robin Karimi is a 75 y.o. male who presents for follow-up of right knee.  He has had progressive increase in right knee pain.  It is now severe and constant and limits his basic activities of daily living.  The pain has progressed to the point where he has very limited walking distance, less than one half block.  The pain is not improved with previous injections and physical therapy exercises.    Allergies:   Allergies   Allergen Reactions   • Benazepril-Hydrochlorothiazide Cough   • Zocor [Simvastatin] Other (See Comments)     MUSCLE PAIN       Medications:   Current Outpatient Medications   Medication Sig Dispense Refill   • amLODIPine (NORVASC) 10 MG tablet Take 10 mg by mouth Daily.     • aspirin 81 MG chewable tablet Chew 81 mg Daily.     • cetirizine (ZyrTEC) 10 MG tablet Take 10 mg by mouth Daily As Needed.     • cholecalciferol (VITAMIN D3) 1000 units tablet Take 1,000 Units by mouth Daily.     • glipiZIDE (GLUCOTROL) 10 MG tablet Take 10 mg by mouth 2 (Two) Times a Day Before Meals.     • Insulin Detemir (LEVEMIR FLEXPEN SC) Inject 34 Units under the skin Every Evening. 1 time at night      • Lactobacillus (PROBIOTIC ACIDOPHILUS) capsule Take 1 tablet/day by mouth Daily.     • losartan-hydrochlorothiazide (HYZAAR) 50-12.5 MG per tablet Take 1 tablet by mouth Daily.     • loteprednol (LOTEMAX) 0.5 % ophthalmic suspension Administer 1 drop to both eyes Daily.     • metFORMIN (GLUCOPHAGE) 500 MG tablet Take 500 mg by mouth 2 (Two) Times a Day With Meals.     • metoprolol tartrate (LOPRESSOR) 50 MG tablet Take 50 mg by mouth Every 12 (Twelve) Hours.     • nitroglycerin (NITROSTAT) 0.4 MG SL tablet Place 0.4 mg under the tongue Every 5 (Five) Minutes As Needed for Chest Pain. Take no  "more than 3 doses in 15 minutes.     • pantoprazole (PROTONIX) 40 MG EC tablet Take 40 mg by mouth Every Morning.  1   • RELION PEN NEEDLE 31G/8MM 31G X 8 MM misc USE 1 ONCE DAILY WITH INSULIN INJECTION     • rosuvastatin (CRESTOR) 20 MG tablet Take 10 mg by mouth Every Night.       No current facility-administered medications for this visit.          The following portions of the patient's history were reviewed and updated as appropriate: allergies, current medications, past family history, past medical history, past social history, past surgical history and problem list.    Review of Systems:   A 14 point review of systems was performed. All systems negative except pertinent positives/negative listed in HPI above    Physical Exam:   Vitals:    01/28/21 1108   Temp: 98.2 °F (36.8 °C)   TempSrc: Temporal   Weight: 95.7 kg (211 lb)   Height: 180.3 cm (71\")   PainSc:   5   PainLoc: Knee       General: A and O x 3, ASA, NAD    SCLERA:    Normal    DENTITION:   Normal  Knee:  right    ALIGNMENT:     Varus  ,   Patella  tracks  midline    GAIT:    Antalgic    SKIN:    No abnormality    RANGE OF MOTION:   5-118   DEG    STRENGTH:   4  / 5    LIGAMENTS:    No varus / valgus instability.   Negative  Lachman.    MENISCUS:     Negative   Melinda       DISTAL PULSES:    Paplable    DISTAL SENSATION :   Intact    LYMPHATICS:     No   lymphadenopathy    OTHER:          - Positive   effusion      - Crepitance with ROM         Radiology:  Xrays 3views right knee (ap,lateral, sunrise) were ordered and reviewed for evaluation of knee pain demonstratingadvanced varus osteoarthritis with bone on bone articulation, subchondral cysts, and periarticular osteophytes  todays xrays were compared to previous xrays and show new findings as described above    Assessment/Plan:  Right knee end-stage varus osteoarthritis  Continuation of conservative management vs. TKA discussed.  The patient wishes to proceed with total knee replacement.  At " this point the patient has failed the full compliment of conservative treatment and stating complete understanding of the risks/benefits/ anternatives wishes to proceed with surgical treatment.    Risk and benefits of surgery were reviewed.  Including, but not limited to, blood clots or pulmonary embolism, anesthesia risk, infection, fracture, skin/leg numbness, persistent pain/crepitance/popping/catching, failure of the implant, need for future surgeries, hematoma, possible nerve or blood vessel injury, need for transfusion, and potential risk of stroke,heart attack or death, among others.  The patient understands and wishes to proceed.     It was explained that if tissue has been repaired or reconstructed, there is also an increased chance of failure which may require further management.  Following the completion of the discussion, the patient expressed understanding of this planned course of care, all their questions were answered and consent will be obtained preoperatively.    Operative Plan: [facility] Total Knee Replacement an overnight staywith home health rehab

## 2021-02-24 ENCOUNTER — HOSPITAL ENCOUNTER (OUTPATIENT)
Dept: VACCINE CLINIC | Facility: HOSPITAL | Age: 76
Discharge: HOME | End: 2021-02-24
Attending: INTERNAL MEDICINE

## 2021-03-18 ENCOUNTER — OFFICE VISIT CONVERTED (OUTPATIENT)
Dept: CARDIOLOGY | Facility: CLINIC | Age: 76
End: 2021-03-18
Attending: INTERNAL MEDICINE

## 2021-03-22 ENCOUNTER — OFFICE VISIT (OUTPATIENT)
Dept: ORTHOPEDIC SURGERY | Facility: CLINIC | Age: 76
End: 2021-03-22

## 2021-03-22 VITALS — WEIGHT: 200 LBS | HEIGHT: 71 IN | BODY MASS INDEX: 28 KG/M2 | TEMPERATURE: 98.6 F

## 2021-03-22 DIAGNOSIS — G56.02 LEFT CARPAL TUNNEL SYNDROME: Primary | ICD-10-CM

## 2021-03-22 PROCEDURE — 99213 OFFICE O/P EST LOW 20 MIN: CPT | Performed by: ORTHOPAEDIC SURGERY

## 2021-03-22 PROCEDURE — 73130 X-RAY EXAM OF HAND: CPT | Performed by: ORTHOPAEDIC SURGERY

## 2021-03-22 RX ORDER — CEFAZOLIN SODIUM 2 G/100ML
2 INJECTION, SOLUTION INTRAVENOUS ONCE
Status: CANCELLED | OUTPATIENT
Start: 2021-04-20 | End: 2021-03-22

## 2021-03-22 NOTE — PROGRESS NOTES
Chief complaint: Left hand weakness, numbness and tingling    Mr. Karimi follows up for his left hand.  I saw him for this issue over a year ago.  At that time, we had discussed surgery.  We had the surgery scheduled and then it ended up being canceled due to the Covid shutdown.  He says his symptoms have gotten worse over the past year.  He comes in today wanting to get the surgery rescheduled.  He reports constant numbness and tingling.  He reports burning pain at night and difficulty sleeping.  He reports weakness.    Left hand examined.  No atrophy.  Skin is benign.  Moderate tenderness over the carpal tunnel with a positive Tinel's and Phalen's.  He does not have much tenderness over the thumb CMC joint and he has a negative thumb grind test.  He has good  and pinch strength.  He does have diminished sensation in the median nerve distribution but otherwise normal sensation.  Brisk capillary refill.    His previous nerve studies from [facility] are reviewed.  He has moderate right and severe left carpal tunnel syndrome.  He does have abnormal ulnar nerve findings as well due to peripheral neuropathy    AP, oblique and lateral views of the left hand are ordered and reviewed.  These are compared to previous x-rays.  He has mild to moderate left thumb CMC and severe STT osteoarthritis.  No other significant findings noted.    Assessment: Severe left carpal tunnel syndrome    Plan:  We discussed the surgery and all that entails including all risks, benefits, and alternatives.  The risks discussed included infection, wound healing problems, hematoma, persistent pain/numbness/weakness despite the surgery, iatrogenic damage to the median and/or motor recurrent branch of the median nerve which could result in permanent weakness, numbness and dysfunction.  We also talked about positioning related neuropraxia, RSD, DVT, PE and death as well.  No guarrantees were given regarding the results of the procedure,  particularly given the severity of his disease.  He has acknowledged understanding and consents to proceed.

## 2021-03-25 ENCOUNTER — HOSPITAL ENCOUNTER (OUTPATIENT)
Dept: VACCINE CLINIC | Facility: HOSPITAL | Age: 76
Discharge: HOME | End: 2021-03-25
Attending: INTERNAL MEDICINE

## 2021-04-07 ENCOUNTER — TRANSCRIBE ORDERS (OUTPATIENT)
Dept: PREADMISSION TESTING | Facility: HOSPITAL | Age: 76
End: 2021-04-07

## 2021-04-12 ENCOUNTER — PRE-ADMISSION TESTING (OUTPATIENT)
Dept: PREADMISSION TESTING | Facility: HOSPITAL | Age: 76
End: 2021-04-12

## 2021-04-12 ENCOUNTER — TELEPHONE (OUTPATIENT)
Dept: ORTHOPEDIC SURGERY | Facility: CLINIC | Age: 76
End: 2021-04-12

## 2021-04-12 VITALS
SYSTOLIC BLOOD PRESSURE: 154 MMHG | BODY MASS INDEX: 29.12 KG/M2 | OXYGEN SATURATION: 98 % | WEIGHT: 208 LBS | RESPIRATION RATE: 16 BRPM | HEART RATE: 69 BPM | HEIGHT: 71 IN | DIASTOLIC BLOOD PRESSURE: 76 MMHG | TEMPERATURE: 98 F

## 2021-04-12 LAB
ANION GAP SERPL CALCULATED.3IONS-SCNC: 12.1 MMOL/L (ref 5–15)
BUN SERPL-MCNC: 19 MG/DL (ref 8–23)
BUN/CREAT SERPL: 17.3 (ref 7–25)
CALCIUM SPEC-SCNC: 9.4 MG/DL (ref 8.6–10.5)
CHLORIDE SERPL-SCNC: 103 MMOL/L (ref 98–107)
CO2 SERPL-SCNC: 25.9 MMOL/L (ref 22–29)
CREAT SERPL-MCNC: 1.1 MG/DL (ref 0.76–1.27)
DEPRECATED RDW RBC AUTO: 41.9 FL (ref 37–54)
ERYTHROCYTE [DISTWIDTH] IN BLOOD BY AUTOMATED COUNT: 12.9 % (ref 12.3–15.4)
GFR SERPL CREATININE-BSD FRML MDRD: 66 ML/MIN/1.73
GLUCOSE SERPL-MCNC: 204 MG/DL (ref 65–99)
HCT VFR BLD AUTO: 43 % (ref 37.5–51)
HGB BLD-MCNC: 14.4 G/DL (ref 13–17.7)
MCH RBC QN AUTO: 29.5 PG (ref 26.6–33)
MCHC RBC AUTO-ENTMCNC: 33.5 G/DL (ref 31.5–35.7)
MCV RBC AUTO: 88.1 FL (ref 79–97)
PLATELET # BLD AUTO: 185 10*3/MM3 (ref 140–450)
PMV BLD AUTO: 11.7 FL (ref 6–12)
POTASSIUM SERPL-SCNC: 4.1 MMOL/L (ref 3.5–5.2)
QT INTERVAL: 400 MS
RBC # BLD AUTO: 4.88 10*6/MM3 (ref 4.14–5.8)
SODIUM SERPL-SCNC: 141 MMOL/L (ref 136–145)
WBC # BLD AUTO: 6.27 10*3/MM3 (ref 3.4–10.8)

## 2021-04-12 PROCEDURE — 36415 COLL VENOUS BLD VENIPUNCTURE: CPT

## 2021-04-12 PROCEDURE — 85027 COMPLETE CBC AUTOMATED: CPT

## 2021-04-12 PROCEDURE — 93010 ELECTROCARDIOGRAM REPORT: CPT | Performed by: INTERNAL MEDICINE

## 2021-04-12 PROCEDURE — 93005 ELECTROCARDIOGRAM TRACING: CPT

## 2021-04-12 PROCEDURE — 80048 BASIC METABOLIC PNL TOTAL CA: CPT

## 2021-04-12 RX ORDER — SENNOSIDES 8.6 MG
1300 CAPSULE ORAL EVERY 8 HOURS PRN
COMMUNITY
End: 2021-04-20 | Stop reason: HOSPADM

## 2021-04-12 RX ORDER — CHOLECALCIFEROL (VITAMIN D3) 125 MCG
500 CAPSULE ORAL DAILY
COMMUNITY
End: 2021-06-15 | Stop reason: HOSPADM

## 2021-04-12 RX ORDER — ASPIRIN 81 MG/1
81 TABLET ORAL DAILY
COMMUNITY
End: 2021-06-15 | Stop reason: HOSPADM

## 2021-04-12 NOTE — TELEPHONE ENCOUNTER
Caller: ASHA PATTERSON    Relationship to patient: WIFE    Best call back number: [phone]    Patient is needing: PATIENT HAD PRE-TESTING DONE TODAY- NEEDED TO LET OFFICE KNOW PATIENT IS DIABETIC AND THE AFTERNOON 2 O'CLOCK SURGERY IS GOING TO BE A PROBLEM- PLEASE CALL PATIENT TO ADVISE    CALL BACK ANYTIME- MAY LEAVE MESSAGE

## 2021-04-12 NOTE — DISCHARGE INSTRUCTIONS
Take the following medications the morning of surgery:    AMLODIPINE, METOPROLOL,PROTONIX    If you are on prescription narcotic pain medication to control your pain you may also take that medication the morning of surgery.    General Instructions:  • Do not eat solid food after midnight the night before surgery.  • You may drink clear liquids day of surgery but must stop at least one hour before your hospital arrival time.  • It is beneficial for you to have a clear drink that contains carbohydrates the day of surgery.  We suggest  a 12 to 20 ounce bottle of G2 or Powerade Zero for diabetic patients.     Clear liquids are liquids you can see through.  Nothing red in color.     Plain water                               Sports drinks  Sodas                                   Gelatin (Jell-O)  Fruit juices without pulp such as white grape juice and apple juice  Popsicles that contain no fruit or yogurt  Tea or coffee (no cream or milk added)  Gatorade / Powerade  G2 / Powerade Zero    • If applicable bring your C-PAP   machine.  • Bring any papers given to you in the doctor’s office.  • Wear clean comfortable clothes.  • Do not wear contact lenses, false eyelashes or make-up.  Bring a case for your glasses.   • Remove all piercings.  Leave jewelry and any other valuables at home.  • The Pre-Admission Testing nurse will instruct you to bring medications if unable to obtain an accurate list in Pre-Admission Testing.   • REPORT TO MAIN AND THEN OVER TO OUTPATIENT SURGERY ON 4-           Preventing a Surgical Site Infection:  • For 2 to 3 days before surgery, avoid shaving with a razor because the razor can irritate skin and make it easier to develop an infection.    • Any areas of open skin can increase the risk of a post-operative wound infection by allowing bacteria to enter and travel throughout the body.  Notify your surgeon if you have any skin wounds / rashes even if it is not near the expected surgical site.   The area will need assessed to determine if surgery should be delayed until it is healed.  • The night prior to surgery shower using a fresh bar of anti-bacterial soap (such as Dial) and clean washcloth.  Sleep in a clean bed with clean clothing.  Do not allow pets to sleep with you.  • Shower on the morning of surgery using a fresh bar of anti-bacterial soap (such as Dial) and clean washcloth.  Dry with a clean towel and dress in clean clothing.  • Ask your surgeon if you will be receiving antibiotics prior to surgery.  • Make sure you, your family, and all healthcare providers clean their hands with soap and water or an alcohol based hand  before caring for you or your wound.    Day of surgery:  Your arrival time is approximately two hours before your scheduled surgery time.  Upon arrival, a Pre-op nurse and Anesthesiologist will review your health history, obtain vital signs, and answer questions you may have.  The only belongings needed at this time will be a list of your home medications and if applicable your C-PAP/BI-PAP machine.  A Pre-op nurse will start an IV and you may receive medication in preparation for surgery, including something to help you relax.     Please be aware that surgery does come with discomfort.  We want to make every effort to control your discomfort so please discuss any uncontrolled symptoms with your nurse.   Your doctor will most likely have prescribed pain medications.      If you are going home after surgery you will receive individualized written care instructions before being discharged.  A responsible adult must drive you to and from the hospital on the day of your surgery and stay with you for 24 hours.  Discharge prescriptions can be filled by the hospital pharmacy during regular pharmacy hours.  If you are having surgery late in the day/evening your prescription may be e-prescribed to your pharmacy.  Please verify your pharmacy hours or chose a 24 hour pharmacy to  avoid not having access to your prescription because your pharmacy has closed for the day.    If you are staying overnight following surgery, you will be transported to your hospital room following the recovery period.  [facility] has all private rooms.    If you have any questions please call Pre-Admission Testing at [phone].  Deductibles and co-payments are collected on the day of service. Please be prepared to pay the required co-pay, deductible or deposit on the day of service as defined by your plan.    Patient Education for Self-Quarantine Process    Following your COVID testing, we strongly recommend that you do not leave your home after you have been tested for COVID except to get medical care. This includes not going to work, school or to public areas.  If this is not possible for you to do please limit your activities to only required outings.  Be sure to wear a mask when you are with other people, practice social distancing and wash your hands frequently.      The following items provide additional details to keep you safe.  • Wash your hands with soap and water frequently for at least 20 seconds.   • Avoid touching your eyes, nose and mouth with unwashed hands.  • Do not share anything - utensils, towels, food from the same bowl.   • Have your own utensils, drinking glass, dishes, towels and bedding.   • Do not have visitors.   • Do use FaceTime to stay in touch with family and friends.  • You should stay in a specific room away from others if possible.   • Stay at least 6 feet away from others in the home if you cannot have a dedicated room to yourself.   • Do not snuggle with your pet. While the CDC says there is no evidence that pets can spread COVID-19 or be infected from humans, it is probably best to avoid “petting, snuggling, being kissed or licked and sharing food (during self-quarantine)”, according to the CDC.   • Sanitize household surfaces daily. Include all high touch  areas (door handles, light switches, phones, countertops, etc.)  • Do not share a bathroom with others, if possible.   • Wear a mask around others in your home if you are unable to stay in a separate room or 6 feet apart. If  you are unable to wear a mask, have your family member wear a mask if they must be within 6 feet of you.   Call your surgeon immediately if you experience any of the following symptoms:  • Sore Throat  • Shortness of Breath or difficulty breathing  • Cough  • Chills  • Body soreness or muscle pain  • Headache  • Fever  • New loss of taste or smell  • Do not arrive for your surgery ill.  Your procedure will need to be rescheduled to another time.  You will need to call your physician before the day of surgery to avoid any unnecessary exposure to hospital staff as well as other patients.

## 2021-04-17 ENCOUNTER — LAB (OUTPATIENT)
Dept: LAB | Facility: HOSPITAL | Age: 76
End: 2021-04-17

## 2021-04-17 PROCEDURE — U0004 COV-19 TEST NON-CDC HGH THRU: HCPCS

## 2021-04-17 PROCEDURE — C9803 HOPD COVID-19 SPEC COLLECT: HCPCS

## 2021-04-19 LAB — SARS-COV-2 RNA RESP QL NAA+PROBE: NOT DETECTED

## 2021-04-20 ENCOUNTER — ANESTHESIA (OUTPATIENT)
Dept: PERIOP | Facility: HOSPITAL | Age: 76
End: 2021-04-20

## 2021-04-20 ENCOUNTER — ANESTHESIA EVENT (OUTPATIENT)
Dept: PERIOP | Facility: HOSPITAL | Age: 76
End: 2021-04-20

## 2021-04-20 ENCOUNTER — HOSPITAL ENCOUNTER (OUTPATIENT)
Facility: HOSPITAL | Age: 76
Setting detail: HOSPITAL OUTPATIENT SURGERY
Discharge: HOME | End: 2021-04-20
Attending: ORTHOPAEDIC SURGERY | Admitting: ORTHOPAEDIC SURGERY

## 2021-04-20 VITALS
OXYGEN SATURATION: 96 % | SYSTOLIC BLOOD PRESSURE: 128 MMHG | TEMPERATURE: 98.4 F | HEART RATE: 64 BPM | RESPIRATION RATE: 16 BRPM | DIASTOLIC BLOOD PRESSURE: 79 MMHG

## 2021-04-20 DIAGNOSIS — G56.02 LEFT CARPAL TUNNEL SYNDROME: ICD-10-CM

## 2021-04-20 LAB
GLUCOSE BLDC GLUCOMTR-MCNC: 133 MG/DL (ref 70–130)
GLUCOSE BLDC GLUCOMTR-MCNC: 173 MG/DL (ref 70–130)

## 2021-04-20 PROCEDURE — 82962 GLUCOSE BLOOD TEST: CPT

## 2021-04-20 PROCEDURE — 25010000003 CEFAZOLIN IN DEXTROSE 2-4 GM/100ML-% SOLUTION: Performed by: ORTHOPAEDIC SURGERY

## 2021-04-20 PROCEDURE — 25010000002 DEXAMETHASONE PER 1 MG: Performed by: NURSE ANESTHETIST, CERTIFIED REGISTERED

## 2021-04-20 PROCEDURE — 25010000002 PROPOFOL 10 MG/ML EMULSION: Performed by: NURSE ANESTHETIST, CERTIFIED REGISTERED

## 2021-04-20 PROCEDURE — 25010000002 MIDAZOLAM PER 1 MG: Performed by: ANESTHESIOLOGY

## 2021-04-20 PROCEDURE — 25010000002 ONDANSETRON PER 1 MG: Performed by: NURSE ANESTHETIST, CERTIFIED REGISTERED

## 2021-04-20 PROCEDURE — 25010000002 FENTANYL CITRATE (PF) 100 MCG/2ML SOLUTION: Performed by: NURSE ANESTHETIST, CERTIFIED REGISTERED

## 2021-04-20 PROCEDURE — 64721 CARPAL TUNNEL SURGERY: CPT | Performed by: ORTHOPAEDIC SURGERY

## 2021-04-20 RX ORDER — HYDROCODONE BITARTRATE AND ACETAMINOPHEN 7.5; 325 MG/1; MG/1
1-2 TABLET ORAL EVERY 4 HOURS PRN
Qty: 42 TABLET | Refills: 0 | Status: SHIPPED | OUTPATIENT
Start: 2021-04-20 | End: 2021-06-03

## 2021-04-20 RX ORDER — PROMETHAZINE HYDROCHLORIDE 25 MG/1
25 SUPPOSITORY RECTAL ONCE AS NEEDED
Status: DISCONTINUED | OUTPATIENT
Start: 2021-04-20 | End: 2021-04-20 | Stop reason: HOSPADM

## 2021-04-20 RX ORDER — PROPOFOL 10 MG/ML
VIAL (ML) INTRAVENOUS AS NEEDED
Status: DISCONTINUED | OUTPATIENT
Start: 2021-04-20 | End: 2021-04-20 | Stop reason: SURG

## 2021-04-20 RX ORDER — SODIUM CHLORIDE 0.9 % (FLUSH) 0.9 %
3 SYRINGE (ML) INJECTION EVERY 12 HOURS SCHEDULED
Status: DISCONTINUED | OUTPATIENT
Start: 2021-04-20 | End: 2021-04-20 | Stop reason: HOSPADM

## 2021-04-20 RX ORDER — ISOPROPYL ALCOHOL 70 ML/100ML
LIQUID TOPICAL AS NEEDED
Status: DISCONTINUED | OUTPATIENT
Start: 2021-04-20 | End: 2021-04-20 | Stop reason: HOSPADM

## 2021-04-20 RX ORDER — DIPHENHYDRAMINE HCL 25 MG
25 CAPSULE ORAL
Status: DISCONTINUED | OUTPATIENT
Start: 2021-04-20 | End: 2021-04-20 | Stop reason: HOSPADM

## 2021-04-20 RX ORDER — FENTANYL CITRATE 50 UG/ML
INJECTION, SOLUTION INTRAMUSCULAR; INTRAVENOUS AS NEEDED
Status: DISCONTINUED | OUTPATIENT
Start: 2021-04-20 | End: 2021-04-20 | Stop reason: SURG

## 2021-04-20 RX ORDER — HYDROMORPHONE HYDROCHLORIDE 1 MG/ML
0.5 INJECTION, SOLUTION INTRAMUSCULAR; INTRAVENOUS; SUBCUTANEOUS
Status: DISCONTINUED | OUTPATIENT
Start: 2021-04-20 | End: 2021-04-20 | Stop reason: HOSPADM

## 2021-04-20 RX ORDER — DIPHENHYDRAMINE HYDROCHLORIDE 50 MG/ML
12.5 INJECTION INTRAMUSCULAR; INTRAVENOUS
Status: DISCONTINUED | OUTPATIENT
Start: 2021-04-20 | End: 2021-04-20 | Stop reason: HOSPADM

## 2021-04-20 RX ORDER — FENTANYL CITRATE 50 UG/ML
50 INJECTION, SOLUTION INTRAMUSCULAR; INTRAVENOUS
Status: DISCONTINUED | OUTPATIENT
Start: 2021-04-20 | End: 2021-04-20 | Stop reason: HOSPADM

## 2021-04-20 RX ORDER — PROMETHAZINE HYDROCHLORIDE 25 MG/1
25 TABLET ORAL ONCE AS NEEDED
Status: DISCONTINUED | OUTPATIENT
Start: 2021-04-20 | End: 2021-04-20 | Stop reason: HOSPADM

## 2021-04-20 RX ORDER — DOCUSATE SODIUM 100 MG/1
100 CAPSULE, LIQUID FILLED ORAL 2 TIMES DAILY
Qty: 60 CAPSULE | Refills: 0 | Status: SHIPPED | OUTPATIENT
Start: 2021-04-20

## 2021-04-20 RX ORDER — LIDOCAINE HYDROCHLORIDE 20 MG/ML
INJECTION, SOLUTION INFILTRATION; PERINEURAL AS NEEDED
Status: DISCONTINUED | OUTPATIENT
Start: 2021-04-20 | End: 2021-04-20 | Stop reason: SURG

## 2021-04-20 RX ORDER — ONDANSETRON 2 MG/ML
INJECTION INTRAMUSCULAR; INTRAVENOUS AS NEEDED
Status: DISCONTINUED | OUTPATIENT
Start: 2021-04-20 | End: 2021-04-20 | Stop reason: SURG

## 2021-04-20 RX ORDER — SODIUM CHLORIDE, SODIUM LACTATE, POTASSIUM CHLORIDE, CALCIUM CHLORIDE 600; 310; 30; 20 MG/100ML; MG/100ML; MG/100ML; MG/100ML
9 INJECTION, SOLUTION INTRAVENOUS CONTINUOUS
Status: DISCONTINUED | OUTPATIENT
Start: 2021-04-20 | End: 2021-04-20 | Stop reason: HOSPADM

## 2021-04-20 RX ORDER — SODIUM CHLORIDE 0.9 % (FLUSH) 0.9 %
3-10 SYRINGE (ML) INJECTION AS NEEDED
Status: DISCONTINUED | OUTPATIENT
Start: 2021-04-20 | End: 2021-04-20 | Stop reason: HOSPADM

## 2021-04-20 RX ORDER — MIDAZOLAM HYDROCHLORIDE 1 MG/ML
1 INJECTION INTRAMUSCULAR; INTRAVENOUS
Status: DISCONTINUED | OUTPATIENT
Start: 2021-04-20 | End: 2021-04-20 | Stop reason: HOSPADM

## 2021-04-20 RX ORDER — DEXAMETHASONE SODIUM PHOSPHATE 10 MG/ML
INJECTION INTRAMUSCULAR; INTRAVENOUS AS NEEDED
Status: DISCONTINUED | OUTPATIENT
Start: 2021-04-20 | End: 2021-04-20 | Stop reason: SURG

## 2021-04-20 RX ORDER — FAMOTIDINE 10 MG/ML
20 INJECTION, SOLUTION INTRAVENOUS ONCE
Status: COMPLETED | OUTPATIENT
Start: 2021-04-20 | End: 2021-04-20

## 2021-04-20 RX ORDER — BUPIVACAINE HYDROCHLORIDE AND EPINEPHRINE 5; 5 MG/ML; UG/ML
INJECTION, SOLUTION PERINEURAL AS NEEDED
Status: DISCONTINUED | OUTPATIENT
Start: 2021-04-20 | End: 2021-04-20 | Stop reason: HOSPADM

## 2021-04-20 RX ORDER — CEFAZOLIN SODIUM 2 G/100ML
2 INJECTION, SOLUTION INTRAVENOUS ONCE
Status: COMPLETED | OUTPATIENT
Start: 2021-04-20 | End: 2021-04-20

## 2021-04-20 RX ORDER — OXYCODONE AND ACETAMINOPHEN 7.5; 325 MG/1; MG/1
1 TABLET ORAL ONCE AS NEEDED
Status: DISCONTINUED | OUTPATIENT
Start: 2021-04-20 | End: 2021-04-20 | Stop reason: HOSPADM

## 2021-04-20 RX ORDER — FLUMAZENIL 0.1 MG/ML
0.2 INJECTION INTRAVENOUS AS NEEDED
Status: DISCONTINUED | OUTPATIENT
Start: 2021-04-20 | End: 2021-04-20 | Stop reason: HOSPADM

## 2021-04-20 RX ORDER — HYDROCODONE BITARTRATE AND ACETAMINOPHEN 7.5; 325 MG/1; MG/1
1 TABLET ORAL ONCE AS NEEDED
Status: COMPLETED | OUTPATIENT
Start: 2021-04-20 | End: 2021-04-20

## 2021-04-20 RX ORDER — ONDANSETRON 4 MG/1
4 TABLET, FILM COATED ORAL EVERY 8 HOURS PRN
Qty: 30 TABLET | Refills: 0 | Status: SHIPPED | OUTPATIENT
Start: 2021-04-20 | End: 2021-06-03

## 2021-04-20 RX ORDER — LABETALOL HYDROCHLORIDE 5 MG/ML
5 INJECTION, SOLUTION INTRAVENOUS
Status: DISCONTINUED | OUTPATIENT
Start: 2021-04-20 | End: 2021-04-20 | Stop reason: HOSPADM

## 2021-04-20 RX ORDER — EPHEDRINE SULFATE 50 MG/ML
5 INJECTION, SOLUTION INTRAVENOUS ONCE AS NEEDED
Status: DISCONTINUED | OUTPATIENT
Start: 2021-04-20 | End: 2021-04-20 | Stop reason: HOSPADM

## 2021-04-20 RX ORDER — ONDANSETRON 2 MG/ML
4 INJECTION INTRAMUSCULAR; INTRAVENOUS ONCE AS NEEDED
Status: DISCONTINUED | OUTPATIENT
Start: 2021-04-20 | End: 2021-04-20 | Stop reason: HOSPADM

## 2021-04-20 RX ORDER — NALOXONE HCL 0.4 MG/ML
0.2 VIAL (ML) INJECTION AS NEEDED
Status: DISCONTINUED | OUTPATIENT
Start: 2021-04-20 | End: 2021-04-20 | Stop reason: HOSPADM

## 2021-04-20 RX ORDER — MIDAZOLAM HYDROCHLORIDE 1 MG/ML
0.5 INJECTION INTRAMUSCULAR; INTRAVENOUS
Status: DISCONTINUED | OUTPATIENT
Start: 2021-04-20 | End: 2021-04-20 | Stop reason: HOSPADM

## 2021-04-20 RX ORDER — HYDRALAZINE HYDROCHLORIDE 20 MG/ML
5 INJECTION INTRAMUSCULAR; INTRAVENOUS
Status: DISCONTINUED | OUTPATIENT
Start: 2021-04-20 | End: 2021-04-20 | Stop reason: HOSPADM

## 2021-04-20 RX ORDER — LIDOCAINE HYDROCHLORIDE 10 MG/ML
0.5 INJECTION, SOLUTION EPIDURAL; INFILTRATION; INTRACAUDAL; PERINEURAL ONCE AS NEEDED
Status: DISCONTINUED | OUTPATIENT
Start: 2021-04-20 | End: 2021-04-20 | Stop reason: HOSPADM

## 2021-04-20 RX ORDER — MAGNESIUM HYDROXIDE 1200 MG/15ML
LIQUID ORAL AS NEEDED
Status: DISCONTINUED | OUTPATIENT
Start: 2021-04-20 | End: 2021-04-20 | Stop reason: HOSPADM

## 2021-04-20 RX ADMIN — ONDANSETRON 4 MG: 2 INJECTION INTRAMUSCULAR; INTRAVENOUS at 13:54

## 2021-04-20 RX ADMIN — FENTANYL CITRATE 25 MCG: 50 INJECTION INTRAMUSCULAR; INTRAVENOUS at 14:17

## 2021-04-20 RX ADMIN — DEXAMETHASONE SODIUM PHOSPHATE 6 MG: 10 INJECTION INTRAMUSCULAR; INTRAVENOUS at 13:54

## 2021-04-20 RX ADMIN — LIDOCAINE HYDROCHLORIDE 60 MG: 20 INJECTION, SOLUTION INFILTRATION; PERINEURAL at 13:44

## 2021-04-20 RX ADMIN — HYDROCODONE BITARTRATE AND ACETAMINOPHEN 1 TABLET: 7.5; 325 TABLET ORAL at 14:50

## 2021-04-20 RX ADMIN — FENTANYL CITRATE 25 MCG: 50 INJECTION INTRAMUSCULAR; INTRAVENOUS at 14:08

## 2021-04-20 RX ADMIN — PROPOFOL 50 MG: 10 INJECTION, EMULSION INTRAVENOUS at 14:01

## 2021-04-20 RX ADMIN — FAMOTIDINE 20 MG: 10 INJECTION INTRAVENOUS at 12:36

## 2021-04-20 RX ADMIN — PROPOFOL 200 MG: 10 INJECTION, EMULSION INTRAVENOUS at 13:44

## 2021-04-20 RX ADMIN — FENTANYL CITRATE 25 MCG: 50 INJECTION INTRAMUSCULAR; INTRAVENOUS at 14:22

## 2021-04-20 RX ADMIN — SODIUM CHLORIDE, POTASSIUM CHLORIDE, SODIUM LACTATE AND CALCIUM CHLORIDE 9 ML/HR: 600; 310; 30; 20 INJECTION, SOLUTION INTRAVENOUS at 12:27

## 2021-04-20 RX ADMIN — MIDAZOLAM 1 MG: 1 INJECTION INTRAMUSCULAR; INTRAVENOUS at 12:36

## 2021-04-20 RX ADMIN — CEFAZOLIN SODIUM 2 G: 2 INJECTION, SOLUTION INTRAVENOUS at 13:47

## 2021-04-20 RX ADMIN — FENTANYL CITRATE 25 MCG: 50 INJECTION INTRAMUSCULAR; INTRAVENOUS at 14:11

## 2021-04-20 NOTE — ANESTHESIA POSTPROCEDURE EVALUATION
Patient: Robin Karimi    Procedure Summary     Date: 04/20/21 Room / [facility]:  [facility]    Anesthesia Start: 1337 Anesthesia Stop: 1433    Procedure: CARPAL TUNNEL RELEASE (Left Wrist) Diagnosis:       Left carpal tunnel syndrome      (Left carpal tunnel syndrome [G56.02])    Surgeons: Ren March MD Provider: Hunter Murcia MD    Anesthesia Type: general ASA Status: 3          Anesthesia Type: general    Vitals  Vitals Value Taken Time   /82 04/20/21 1455   Temp 36.9 °C (98.4 °F) 04/20/21 1430   Pulse 62 04/20/21 1500   Resp 18 04/20/21 1450   SpO2 96 % 04/20/21 1501   Vitals shown include unvalidated device data.        Post Anesthesia Care and Evaluation    Patient location during evaluation: bedside  Patient participation: complete - patient participated  Level of consciousness: awake and alert  Pain management: adequate  Airway patency: patent  Anesthetic complications: No anesthetic complications  PONV Status: controlled  Cardiovascular status: blood pressure returned to baseline and acceptable  Respiratory status: acceptable  Hydration status: acceptable

## 2021-04-20 NOTE — BRIEF OP NOTE
CARPAL TUNNEL RELEASE  Progress Note    Robin Karimi  4/20/2021    Pre-op Diagnosis:   Left carpal tunnel syndrome [G56.02]       Post-Op Diagnosis Codes:     * Left carpal tunnel syndrome [G56.02]    Procedure/CPT® Codes:        Procedure(s):  CARPAL TUNNEL RELEASE    Surgeon(s):  Ren March MD    Anesthesia: Choice    Staff:   Circulator: Vinicio Lowe RN; Rachel Smith RN  Scrub Person: Emely Daniel  Assistant: Tana Amin  Assistant: Tana Amin      Estimated Blood Loss: minimal    Urine Voided: * No values recorded between 4/20/2021  1:37 PM and 4/20/2021  2:21 PM *    Specimens:                None          Drains: * No LDAs found *    Findings: see dictation    Complications: none    Assistant: Tana Amin  was responsible for performing the following activities: Retraction and their skilled assistance was necessary for the success of this case.    Ren March MD     Date: 4/20/2021  Time: 14:26 EDT

## 2021-04-20 NOTE — OP NOTE
Orthopaedic Operative Note    Facility: [facility]    Patient: Robin Karimi    Medical Record Number: [id number]    YOB: 1945    Dictating Surgeon: Ren March M.D.    Primary Care Physician: Tim Corey MD    Date of Operation: 04/20/21    PREOPERATIVE DIAGNOSIS: Left carpal tunnel syndrome    POSTOPERATIVE DIAGNOSIS: Left carpal tunnel syndrome    PROCEDURE PERFORMED:  Left carpal tunnel release    SURGEON: Ren March MD     ASSISTANT: Tana Amin whose assistance was critical for help with patient positioning, suctioning and irrigation, retraction, manipulation of the extremity for exposure, wound closure and application of the bandages.  Her assistance was critical to the success of this case.      ANESTHESIA: General    COMPLICATIONS: None.     ESTIMATED BLOOD LOSS: Less than 25 mL.     Brief Operative Indication:  The patient had a long history of left carpal tunnel syndrome which had been progressively nonresponsive to conservative treatment.  We talked about surgical and non-surgical treatment options.  The patient was felt to be a candidate for a carpal tunnel release.   I explained that surgical risks include infection, hematoma, persistent pain, loss of motion, iatrogenic nerve and/or blood vessel injury resulting in permanent weakness, numbness or dysfunction (particularly the motor recurrent branch of the median nerve), DVT, PE, positioning related neuropraxia, and anesthesia related complications resulting in death.     Description of Procedure in Detail:  The patient and operative site were identified in the preoperative holding area.  The surgical site was marked.  Preoperative antibiotics were administered.  The patient was then taken to the operating room where general anesthesia administered.  The patient was repositioned on the operating table.  The left upper extremity was prepped and draped in the standard sterile fashion.  I cleaned  the extremity with an alcohol solution.  The extremity was then prepped with Hibiclens followed by 2 ChloraPreps.  I allowed the ChloraPreps to dry for 3 minutes before the draping procedure was carried out.    A timeout was taken prior to surgical incision.  I began the procedure by fashioning an approximately 3 cm incision over the volar aspect of the wrist centered over the carpal tunnel and transverse carpal ligament.  Full-thickness skin flaps were developed.  The palmar cutaneous fascia was divided and the transverse carpal ligament exposed.  Under direct visualization, taking care to watch out for any aberrant anatomy of the motor recurrent branch of the median nerve, the midportion of the ligament was sharply divided down to the point I could visualize the nerve.  Retractors were then repositioned distally.  The distal extent of the ligament was then released sharply under direct visualization.  I repositioned the retractors proximally.  Once again, under direct visualization, I released the proximal extent of the ligament.  I did exercise great caution to avoid iatrogenic injury to the motor recurrent branch of the median nerve throughout the case.  No aberrant anatomy was identified.  I confirmed that the ligament was completely released and the nerve completely decompressed.  I then directed my attention to closure.    The wound was copiously irrigated out with sterile saline and closed in a layered fashion using Vicryl for the deep tissues and nylon for the skin.  Sterile dressings were applied to the wounds and the drapes withdrawn.  The patient was awakened and transferred to the recovery room.  The patient tolerated the procedure well.  There were no complications.    Ren March MD    04/20/21

## 2021-04-20 NOTE — ANESTHESIA PROCEDURE NOTES
Airway  Urgency: elective    Date/Time: 4/20/2021 1:46 PM  Airway not difficult    General Information and Staff    Patient location during procedure: OR  CRNA: Kristi Faye CRNA    Indications and Patient Condition  Indications for airway management: airway protection    Preoxygenated: yes  Mask difficulty assessment: 1 - vent by mask    Final Airway Details  Final airway type: supraglottic airway      Successful airway: classic  Size 5    Number of attempts at approach: 1    Additional Comments  Smooth IV induction. LMA inserted with ease. Cuff up. Lma secured. BEBS

## 2021-04-26 NOTE — TELEPHONE ENCOUNTER
They can remove the dressings and it is fine for him to shower starting tomorrow.  I still recommend that he not soak the wound in a bathtub, dishwater, etc.  I recommend that he put an Ace wrap over the hand after showering.  I recommend that he keep the wound covered with an Ace wrap until his follow-up.

## 2021-04-26 NOTE — TELEPHONE ENCOUNTER
Caller: ASHA PATTERSON    Relationship to patient: SPOUSE    Best call back number: [phone]    Patient is needing: THEY NEED MEDICAL ADVICE ABOUT WHEN ITS OK TO CHANGE BANDAGES AND WRAPS.  UNABLE TO WARM TRANSFER

## 2021-05-05 ENCOUNTER — OFFICE VISIT (OUTPATIENT)
Dept: ORTHOPEDIC SURGERY | Facility: CLINIC | Age: 76
End: 2021-05-05

## 2021-05-05 VITALS — WEIGHT: 200 LBS | BODY MASS INDEX: 28 KG/M2 | HEIGHT: 71 IN | TEMPERATURE: 97.7 F

## 2021-05-05 DIAGNOSIS — Z09 SURGERY FOLLOW-UP: Primary | ICD-10-CM

## 2021-05-05 PROCEDURE — 99024 POSTOP FOLLOW-UP VISIT: CPT | Performed by: ORTHOPAEDIC SURGERY

## 2021-05-05 NOTE — PROGRESS NOTES
Robin Karimi : 1945 MRN: [id number] DATE: 2021    CC: 2 weeks s/p left carpal tunnel release    HPI: Patient returns to clinic today stating pain is improving.  Denies fevers, chills, sweats.  No complaints.  Pain is well-controlled.    Vitals:    21 1250   Temp: 97.7 °F (36.5 °C)        Exam: Dressings were in place and subsequently removed.  Incision appears well approximated and benign.  No erythema, drainage or increased warmth.  Able to flex and extend wrist with minimal discomfort.  Good motor function in hand.  Brisk cap refill.    Impression:  2 weeks s/p left carpal tunnel release    Plan:    1.  Sutures removed and replaced with steri strips.  2.  Counseled patient about appropriate activity modifications and restrictions, including no soaking wound.  3.  Follow up in 4 weeks for final recheck.    Ren March MD

## 2021-05-10 NOTE — H&P
History and Physical      Patient Name: Robin Karimi   Patient ID: [id number]   Sex: Male   YOB: 1945    Primary Care Provider: Tim Corey MD   Referring Provider: Tim Corey MD    Visit Date: March 18, 2021    Provider: Ahsan Dewitt MD   [facility] [facility]   Location Address: [address] [place] [address], [facility]   [place]  [id number]   Location Phone: [phone]          Chief Complaint     CAD and hypertension.       History Of Present Illness  Consult requested by: Tim Corey MD   Robin Karimi is a 75 year old /White male with nonocclusive coronary artery disease, hypertension, and obstructive sleep apnea who is compliant with his CPAP. He is transferring care. The patient has been having some issues with blood pressure control recently, at times ranging in the low 140s. Denies any ongoing chest pain problems. He has chronic mild edema and shortness of breath with activities but no other complaints.   PAST MEDICAL HISTORY: Significant for CAD nonocclusive with heart catheterization done many years ago showing 20-40% disease, diabetes type 2, hypertension, obstructive sleep apnea, osteoarthritis.   FAMILY MEDICAL HISTORY: Positive for diabetes; hypertension; heart disease.   PSYCHOSOCIAL HISTORY: Denies alcohol use. Denies tobacco use.   CURRENT MEDICATIONS: Amlodipine 10 mg 1/2 tab b.i.d.; aspirin 81 mg daily; cetirizine 10 mg daily as needed; cholecalciferol 1000 units daily; glipizide 10 mg-2 b.i.d.; Levemir Flexpen SC q.h.s.; Losartan-hydrochlorothiazide 50-12.5 mg daily; loteprednol 0.5% one drop to both eyes t.i.d.; metformin 500 mg b.i.d.; metoprolol tartrate 50 mg every 12 hours; Adult multivitamin daily; nitroglycerin 0.4 mg SL p.r.n.; pantoprazole 40 mg every morning; probiotic acidophilus daily; rosuvastatin 20 mg every night; B12 500 mg daily.   ALLERGIES: Zocor.       Review of Systems  · Constitutional  o Admits  o : good general health lately, recent weight  "changes   o Denies  o : fatigue  · Eyes  o Denies  o : double vision  · HENT  o Admits  o : hearing loss or ringing  o Denies  o : chronic sinus problem, swollen glands in neck  · Cardiovascular  o Admits  o : swelling (feet, ankles, hands), shortness of breath while walking or lying flat  o Denies  o : chest pain, palpitations (fast, fluttering, or skipping beats)  · Respiratory  o Admits  o : chronic or frequent cough, COPD  o Denies  o : asthma or wheezing  · Gastrointestinal  o Admits  o : ulcers  o Denies  o : nausea or vomiting  · Neurologic  o Denies  o : lightheaded or dizzy, stroke, headaches  · Musculoskeletal  o Admits  o : joint pain  o Denies  o : back pain  · Endocrine  o Admits  o : diabetes  o Denies  o : thyroid disease, heat or cold intolerance, excessive thirst or urination  · Heme-Lymph  o Admits  o : bleeding or bruising tendency  o Denies  o : anemia      Vitals  Date Time BP Position Site L\R Cuff Size HR RR TEMP (F) WT  HT  BMI kg/m2 BSA m2 O2 Sat FR L/min FiO2 HC       03/18/2021 09:51 /62 Sitting    66 - R   210lbs 0oz 5'  11\" 29.29 2.18       03/18/2021 09:51 /60 Sitting                       Physical Examination  · Constitutional  o Appearance  o : Awake, alert, in no acute distress.   · Head and Face  o HEENT  o : PERRLA.  · Eyes  o Conjunctivae  o : Normal.  · Ears, Nose, Mouth and Throat  o Oral Cavity  o :   § Oral Mucosa  § : Normal.  · Neck  o Inspection/Palpation  o : No JVD.  · Respiratory  o Respiratory  o : Chest is symmetrical. Lung fields are clear. No rhonchi or wheezes heard.  · Cardiovascular  o Heart  o :   § Auscultation of Heart  § : S1, S2 are normal. Regular rate and rhythm without murmurs, gallops, or rubs.  o Peripheral Vascular System  o :   § Extremities  § : Nails normal. No clubbing or cyanosis. Femoral pulses adequate. Pedal pulses adequate. No peripheral edema.  · Gastrointestinal  o Abdominal Examination  o : Abdomen soft. No masses. No guarding or " rigidity. No hepatosplenomegaly. Bowel sounds normal.  · Musculoskeletal  o General  o :   § General Musculoskeletal  § : Muscle tone and strength were normal.  · Skin and Subcutaneous Tissue  o General Inspection  o : Unremarkable.  · EKG  o EKG  o : Was performed in the office today.  o Indications  o : CAD.  o Results  o : Showed normal sinus rhythm with borderline T-wave abnormalities.  · Labs  o Labs  o : Craetinine 1.3, potassium 4.5, hgb 13.7, LDL cholesterol was 66.          Assessment     1.  Coronary artery disease, nonocclusive, no angina. Continue with chronic aspirin 81 mg once a day.  2.  Hyperlipidemia. On statin, keep LDL below 70, tolerating well.  3.  Hypertension, mild elevation. Counseled on low sodium diet. Will increase his losartan to 100/25. Repeat a renal panel in 2 weeks to evaluate for hypokalemia.         Ahsan Dewitt MD  /               Electronically Signed by: Janet Madrid-, OT -Author on March 29, 2021 07:22:30 AM  Electronically Co-signed by: Ahsan Dewitt MD -Reviewer on March 29, 2021 11:41:28 AM

## 2021-05-14 VITALS
DIASTOLIC BLOOD PRESSURE: 62 MMHG | BODY MASS INDEX: 29.4 KG/M2 | SYSTOLIC BLOOD PRESSURE: 142 MMHG | WEIGHT: 210 LBS | HEIGHT: 71 IN | HEART RATE: 66 BPM

## 2021-05-15 VITALS
BODY MASS INDEX: 29.12 KG/M2 | HEIGHT: 71 IN | SYSTOLIC BLOOD PRESSURE: 145 MMHG | HEART RATE: 61 BPM | DIASTOLIC BLOOD PRESSURE: 65 MMHG | WEIGHT: 208 LBS

## 2021-05-15 VITALS — OXYGEN SATURATION: 99 % | HEART RATE: 66 BPM | HEIGHT: 71 IN | BODY MASS INDEX: 28.48 KG/M2

## 2021-05-28 ENCOUNTER — TRANSCRIBE ORDERS (OUTPATIENT)
Dept: PREADMISSION TESTING | Facility: HOSPITAL | Age: 76
End: 2021-05-28

## 2021-06-02 ENCOUNTER — OFFICE VISIT (OUTPATIENT)
Dept: ORTHOPEDIC SURGERY | Facility: CLINIC | Age: 76
End: 2021-06-02

## 2021-06-02 VITALS — BODY MASS INDEX: 28 KG/M2 | HEIGHT: 71 IN | TEMPERATURE: 97.1 F | WEIGHT: 200 LBS

## 2021-06-02 DIAGNOSIS — Z09 SURGERY FOLLOW-UP: Primary | ICD-10-CM

## 2021-06-02 PROCEDURE — 99024 POSTOP FOLLOW-UP VISIT: CPT | Performed by: NURSE PRACTITIONER

## 2021-06-02 NOTE — PROGRESS NOTES
Robin Karimi : 1945 MRN: [id number] DATE: 2021    CC: 6 weeks s/p left carpal tunnel release    HPI: Pt. returns to clinic today stating pain is resolved.  Pre-op symptoms are significantly improved.  No complaints.    Vitals:    21 1501   Temp: 97.1 °F (36.2 °C)       Exam:  Incision is healed and benign.  Good wrist and hand motion.  Good motor and sensory function throughout hand.  Brisk cap refill    Imaging    none    Impression:  6 weeks s/p left carpal tunnel release    Plan:    Patient is doing well and can follow up as needed.    BUFFY Arauz    2021

## 2021-06-03 ENCOUNTER — PRE-ADMISSION TESTING (OUTPATIENT)
Dept: PREADMISSION TESTING | Facility: HOSPITAL | Age: 76
End: 2021-06-03

## 2021-06-03 ENCOUNTER — TELEPHONE (OUTPATIENT)
Dept: ORTHOPEDIC SURGERY | Facility: CLINIC | Age: 76
End: 2021-06-03

## 2021-06-03 VITALS
OXYGEN SATURATION: 98 % | TEMPERATURE: 97.8 F | WEIGHT: 209 LBS | SYSTOLIC BLOOD PRESSURE: 142 MMHG | HEIGHT: 71 IN | HEART RATE: 64 BPM | RESPIRATION RATE: 16 BRPM | BODY MASS INDEX: 29.26 KG/M2 | DIASTOLIC BLOOD PRESSURE: 72 MMHG

## 2021-06-03 DIAGNOSIS — M17.11 PRIMARY OSTEOARTHRITIS OF RIGHT KNEE: ICD-10-CM

## 2021-06-03 LAB
ANION GAP SERPL CALCULATED.3IONS-SCNC: 14 MMOL/L (ref 5–15)
BILIRUB UR QL STRIP: NEGATIVE
BUN SERPL-MCNC: 18 MG/DL (ref 8–23)
BUN/CREAT SERPL: 15.1 (ref 7–25)
CALCIUM SPEC-SCNC: 9.3 MG/DL (ref 8.6–10.5)
CHLORIDE SERPL-SCNC: 100 MMOL/L (ref 98–107)
CLARITY UR: CLEAR
CO2 SERPL-SCNC: 27 MMOL/L (ref 22–29)
COLOR UR: YELLOW
CREAT SERPL-MCNC: 1.19 MG/DL (ref 0.76–1.27)
DEPRECATED RDW RBC AUTO: 42.1 FL (ref 37–54)
ERYTHROCYTE [DISTWIDTH] IN BLOOD BY AUTOMATED COUNT: 12.8 % (ref 12.3–15.4)
GFR SERPL CREATININE-BSD FRML MDRD: 60 ML/MIN/1.73
GLUCOSE SERPL-MCNC: 201 MG/DL (ref 65–99)
GLUCOSE UR STRIP-MCNC: NEGATIVE MG/DL
HCT VFR BLD AUTO: 43.7 % (ref 37.5–51)
HGB BLD-MCNC: 14.2 G/DL (ref 13–17.7)
HGB UR QL STRIP.AUTO: NEGATIVE
KETONES UR QL STRIP: NEGATIVE
LEUKOCYTE ESTERASE UR QL STRIP.AUTO: NEGATIVE
MCH RBC QN AUTO: 29.3 PG (ref 26.6–33)
MCHC RBC AUTO-ENTMCNC: 32.5 G/DL (ref 31.5–35.7)
MCV RBC AUTO: 90.1 FL (ref 79–97)
NITRITE UR QL STRIP: NEGATIVE
PH UR STRIP.AUTO: 5.5 [PH] (ref 5–8)
PLATELET # BLD AUTO: 175 10*3/MM3 (ref 140–450)
PMV BLD AUTO: 12.1 FL (ref 6–12)
POTASSIUM SERPL-SCNC: 3.9 MMOL/L (ref 3.5–5.2)
PROT UR QL STRIP: NEGATIVE
RBC # BLD AUTO: 4.85 10*6/MM3 (ref 4.14–5.8)
SODIUM SERPL-SCNC: 141 MMOL/L (ref 136–145)
SP GR UR STRIP: 1.01 (ref 1–1.03)
UROBILINOGEN UR QL STRIP: NORMAL
WBC # BLD AUTO: 6.63 10*3/MM3 (ref 3.4–10.8)

## 2021-06-03 PROCEDURE — A9270 NON-COVERED ITEM OR SERVICE: HCPCS | Performed by: ORTHOPAEDIC SURGERY

## 2021-06-03 PROCEDURE — 81003 URINALYSIS AUTO W/O SCOPE: CPT

## 2021-06-03 PROCEDURE — 80048 BASIC METABOLIC PNL TOTAL CA: CPT

## 2021-06-03 PROCEDURE — 85027 COMPLETE CBC AUTOMATED: CPT

## 2021-06-03 PROCEDURE — 63710000001 MUPIROCIN 2 % OINTMENT: Performed by: ORTHOPAEDIC SURGERY

## 2021-06-03 PROCEDURE — 36415 COLL VENOUS BLD VENIPUNCTURE: CPT

## 2021-06-03 ASSESSMENT — KOOS JR
KOOS JR SCORE: 19
KOOS JR SCORE: 39.625

## 2021-06-03 NOTE — DISCHARGE INSTRUCTIONS
Take the following medications the morning of surgery:    AMLODIPINE,  METOPROLOL AND PROTONIX    TIME OF ARRIVAL WILL BE GIVEN TO YOU BY DR MCKEON'S OFFICE    If you are on prescription narcotic pain medication to control your pain you may also take that medication the morning of surgery.    General Instructions:  • Do not eat solid food after midnight the night before surgery.  • You may drink clear liquids day of surgery but must stop at least one hour before your hospital arrival time.  • It is beneficial for you to have a clear drink that contains carbohydrates the day of surgery.  We suggest a 12 to 20 ounce bottle of Gatorade or Powerade for non-diabetic patients or a 12 to 20 ounce bottle of G2 or Powerade Zero for diabetic patients. (Pediatric patients, are not advised to drink a 12 to 20 ounce carbohydrate drink)    Clear liquids are liquids you can see through.  Nothing red in color.     Plain water                               Sports drinks  Sodas                                   Gelatin (Jell-O)  Fruit juices without pulp such as white grape juice and apple juice  Popsicles that contain no fruit or yogurt  Tea or coffee (no cream or milk added)  Gatorade / Powerade  G2 / Powerade Zero    • Infants may have breast milk up to four hours before surgery.  • Infants drinking formula may drink formula up to six hours before surgery.   • Patients who avoid smoking, chewing tobacco and alcohol for 4 weeks prior to surgery have a reduced risk of post-operative complications.  Quit smoking as many days before surgery as you can.  • Do not smoke, use chewing tobacco or drink alcohol the day of surgery.   • If applicable bring your C-PAP/ BI-PAP machine.  • Bring any papers given to you in the doctor’s office.  • Wear clean comfortable clothes.  • Do not wear contact lenses, false eyelashes or make-up.  Bring a case for your glasses.   • Bring crutches or walker if applicable.  • Remove all piercings.  Leave  jewelry and any other valuables at home.  • Hair extensions with metal clips must be removed prior to surgery.  • The Pre-Admission Testing nurse will instruct you to bring medications if unable to obtain an accurate list in Pre-Admission Testing.        If you were given a blood bank ID arm band remember to bring it with you the day of surgery.    Preventing a Surgical Site Infection:  • For 2 to 3 days before surgery, avoid shaving with a razor because the razor can irritate skin and make it easier to develop an infection.    • Any areas of open skin can increase the risk of a post-operative wound infection by allowing bacteria to enter and travel throughout the body.  Notify your surgeon if you have any skin wounds / rashes even if it is not near the expected surgical site.  The area will need assessed to determine if surgery should be delayed until it is healed.  • The night prior to surgery shower using a fresh bar of anti-bacterial soap (such as Dial) and clean washcloth.  Sleep in a clean bed with clean clothing.  Do not allow pets to sleep with you.  • Shower on the morning of surgery using a fresh bar of anti-bacterial soap (such as Dial) and clean washcloth.  Dry with a clean towel and dress in clean clothing.  • Ask your surgeon if you will be receiving antibiotics prior to surgery.  • Make sure you, your family, and all healthcare providers clean their hands with soap and water or an alcohol based hand  before caring for you or your wound.    Day of surgery:  Your arrival time is approximately two hours before your scheduled surgery time.  Upon arrival, a Pre-op nurse and Anesthesiologist will review your health history, obtain vital signs, and answer questions you may have.  The only belongings needed at this time will be a list of your home medications and if applicable your C-PAP/BI-PAP machine.  A Pre-op nurse will start an IV and you may receive medication in preparation for surgery,  including something to help you relax.     Please be aware that surgery does come with discomfort.  We want to make every effort to control your discomfort so please discuss any uncontrolled symptoms with your nurse.   Your doctor will most likely have prescribed pain medications.      If you are going home after surgery you will receive individualized written care instructions before being discharged.  A responsible adult must drive you to and from the hospital on the day of your surgery and stay with you for 24 hours.  Discharge prescriptions can be filled by the hospital pharmacy during regular pharmacy hours.  If you are having surgery late in the day/evening your prescription may be e-prescribed to your pharmacy.  Please verify your pharmacy hours or chose a 24 hour pharmacy to avoid not having access to your prescription because your pharmacy has closed for the day.    If you are staying overnight following surgery, you will be transported to your hospital room following the recovery period.  [facility] has all private rooms.    If you have any questions please call Pre-Admission Testing at [phone].  Deductibles and co-payments are collected on the day of service. Please be prepared to pay the required co-pay, deductible or deposit on the day of service as defined by your plan.    Patient Education for Self-Quarantine Process    Following your COVID testing, we strongly recommend that you do not leave your home after you have been tested for COVID except to get medical care. This includes not going to work, school or to public areas.  If this is not possible for you to do please limit your activities to only required outings.  Be sure to wear a mask when you are with other people, practice social distancing and wash your hands frequently.      The following items provide additional details to keep you safe.  • Wash your hands with soap and water frequently for at least 20 seconds.    • Avoid touching your eyes, nose and mouth with unwashed hands.  • Do not share anything - utensils, towels, food from the same bowl.   • Have your own utensils, drinking glass, dishes, towels and bedding.   • Do not have visitors.   • Do use FaceTime to stay in touch with family and friends.  • You should stay in a specific room away from others if possible.   • Stay at least 6 feet away from others in the home if you cannot have a dedicated room to yourself.   • Do not snuggle with your pet. While the CDC says there is no evidence that pets can spread COVID-19 or be infected from humans, it is probably best to avoid “petting, snuggling, being kissed or licked and sharing food (during self-quarantine)”, according to the CDC.   • Sanitize household surfaces daily. Include all high touch areas (door handles, light switches, phones, countertops, etc.)  • Do not share a bathroom with others, if possible.   • Wear a mask around others in your home if you are unable to stay in a separate room or 6 feet apart. If  you are unable to wear a mask, have your family member wear a mask if they must be within 6 feet of you.   Call your surgeon immediately if you experience any of the following symptoms:  • Sore Throat  • Shortness of Breath or difficulty breathing  • Cough  • Chills  • Body soreness or muscle pain  • Headache  • Fever  • New loss of taste or smell  • Do not arrive for your surgery ill.  Your procedure will need to be rescheduled to another time.  You will need to call your physician before the day of surgery to avoid any unnecessary exposure to hospital staff as well as other patients.    CHLORHEXIDINE CLOTH INSTRUCTIONS  The morning of surgery follow these instructions using the Chlorhexidine cloths you've been given.  These steps reduce bacteria on the body.  Do not use the cloths near your eyes, ears mouth, genitalia or on open wounds.  Throw the cloths away after use but do not try to flush them down a  toilet.      • Open and remove one cloth at a time from the package.    • Leave the cloth unfolded and begin the bathing.  • Massage the skin with the cloths using gentle pressure to remove bacteria.  Do not scrub harshly.   • Follow the steps below with one 2% CHG cloth per area (6 total cloths).  • One cloth for neck, shoulders and chest.  • One cloth for both arms, hands, fingers and underarms (do underarms last).  • One cloth for the abdomen followed by groin.  • One cloth for right leg and foot including between the toes.  • One cloth for left leg and foot including between the toes.  • The last cloth is to be used for the back of the neck, back and buttocks.    Allow the CHG to air dry 3 minutes on the skin which will give it time to work and decrease the chance of irritation.  The skin may feel sticky until it is dry.  Do not rinse with water or any other liquid or you will lose the beneficial effects of the CHG.  If mild skin irritation occurs, do rinse the skin to remove the CHG.  Report this to the nurse at time of admission.  Do not apply lotions, creams, ointments, deodorants or perfumes after using the clothes. Dress in clean clothes before coming to the hospital.    BACTROBAN NASAL OINTMENT  There are many germs normally in your nose. Bactroban is an ointment that will help reduce these germs. Please follow these instructions for Bactroban use:      ____The day before surgery in the morning  Date________    ____The day before surgery in the evening              Date________    ____The day of surgery in the morning    Date________    **Squirt ½ package of Bactroban Ointment onto a cotton applicator and apply to inside of 1st nostril.  Squirt the remaining Bactroban and apply to the inside of the other nostril.    PERIDEX- ORAL:  Use only if your surgeon has ordered  Use the night before and morning of surgery - Swish, gargle, and spit - do not swallow.

## 2021-06-03 NOTE — TELEPHONE ENCOUNTER
Received a call from the nurse practitioner in [facility].  Patient is there for his preadmission testing today however he lives in [place] and would prefer not to drive back up for his Covid test.  He would like to have his Covid test done at [facility].  Covid test order has already been placed by the hospital.  Have printed off and faxed a copy to [id number] for them to give to the patient to take to [facility]

## 2021-06-04 ENCOUNTER — TRANSCRIBE ORDERS (OUTPATIENT)
Dept: LAB | Facility: HOSPITAL | Age: 76
End: 2021-06-04

## 2021-06-04 DIAGNOSIS — Z01.818 PRE-OP TESTING: Primary | ICD-10-CM

## 2021-06-10 ENCOUNTER — OFFICE VISIT (OUTPATIENT)
Dept: ORTHOPEDIC SURGERY | Facility: CLINIC | Age: 76
End: 2021-06-10

## 2021-06-10 ENCOUNTER — TRANSCRIBE ORDERS (OUTPATIENT)
Dept: PREADMISSION TESTING | Facility: HOSPITAL | Age: 76
End: 2021-06-10

## 2021-06-10 VITALS
TEMPERATURE: 96.6 F | WEIGHT: 209 LBS | BODY MASS INDEX: 29.26 KG/M2 | DIASTOLIC BLOOD PRESSURE: 66 MMHG | HEIGHT: 71 IN | SYSTOLIC BLOOD PRESSURE: 144 MMHG

## 2021-06-10 DIAGNOSIS — Z01.818 OTHER SPECIFIED PRE-OPERATIVE EXAMINATION: Primary | ICD-10-CM

## 2021-06-10 DIAGNOSIS — R52 PAIN: Primary | ICD-10-CM

## 2021-06-10 PROCEDURE — 77077 JOINT SURVEY SINGLE VIEW: CPT | Performed by: ORTHOPAEDIC SURGERY

## 2021-06-10 PROCEDURE — S0260 H&P FOR SURGERY: HCPCS | Performed by: NURSE PRACTITIONER

## 2021-06-10 RX ORDER — HYDROCHLOROTHIAZIDE 25 MG/1
12.5 TABLET ORAL DAILY
COMMUNITY
End: 2022-07-18

## 2021-06-10 NOTE — H&P (VIEW-ONLY)
Patient: Robin Karimi    Date of Admission: 6/14/2021    YOB: 1945    Medical Record Number: [id number]    Admitting Physician: Dr. Seth Floyd    Reason for Admission: End Stage Right Knee OA    History of Present Illness: 76 y.o. male presents with severe end stage knee osteoarthritis which has not been responsive to the full compliment of conservative measures. Despite conservative attempts, there is still severe, constant activity limiting pain. Given the severity of the pain, the patient has elected to proceed with knee replacement.    Allergies:   Allergies   Allergen Reactions   • Benazepril-Hydrochlorothiazide Cough   • Zocor [Simvastatin] Other (See Comments)     MUSCLE PAIN         Current Medications:  Home Medications:    Current Outpatient Medications on File Prior to Visit   Medication Sig   • amLODIPine (NORVASC) 10 MG tablet Take 10 mg by mouth Daily.   • aspirin 81 MG EC tablet Take 81 mg by mouth Daily. TO HOLD 1 WEEK BEFORE SURGERY   • cetirizine (ZyrTEC) 10 MG tablet Take 10 mg by mouth Daily As Needed.   • docusate sodium (COLACE) 100 MG capsule Take 1 capsule by mouth 2 (Two) Times a Day. (Patient taking differently: Take 100 mg by mouth Daily As Needed.)   • glipiZIDE (GLUCOTROL) 10 MG tablet Take 10 mg by mouth 2 (Two) Times a Day Before Meals.   • hydroCHLOROthiazide (HYDRODIURIL) 25 MG tablet hydrochlorothiazide 25 mg oral tablet take one-half tablet (12.5 mg) by oral route 2 times per day   Active   • Insulin Detemir (LEVEMIR FLEXPEN SC) Inject 50 Units under the skin into the appropriate area as directed Every Evening.   • Lactobacillus (PROBIOTIC ACIDOPHILUS) capsule Take 1 tablet/day by mouth Take As Directed. TAKES 4 X A WEEK   • losartan-hydrochlorothiazide (HYZAAR) 50-12.5 MG per tablet Take 1 tablet by mouth Every Morning.   • loteprednol (LOTEMAX) 0.5 % ophthalmic suspension Administer 1 drop to both eyes Every Morning.   • metFORMIN (GLUCOPHAGE) 500 MG tablet Take  500 mg by mouth 2 (Two) Times a Day With Meals.   • metoprolol tartrate (LOPRESSOR) 50 MG tablet Take 50 mg by mouth 2 (Two) Times a Day.   • nitroglycerin (NITROSTAT) 0.4 MG SL tablet Place 0.4 mg under the tongue Every 5 (Five) Minutes As Needed for Chest Pain. Take no more than 3 doses in 15 minutes.   • pantoprazole (PROTONIX) 40 MG EC tablet Take 40 mg by mouth Every Morning.   • RELION PEN NEEDLE 31G/8MM 31G X 8 MM misc USE 1 ONCE DAILY WITH INSULIN INJECTION   • rosuvastatin (CRESTOR) 20 MG tablet Take 10 mg by mouth Every Night.   • cholecalciferol (VITAMIN D3) 1000 units tablet Take 1,000 Units by mouth Daily.   • vitamin B-12 (CYANOCOBALAMIN) 500 MCG tablet Take 500 mcg by mouth Daily.     Current Facility-Administered Medications on File Prior to Visit   Medication   • Chlorhexidine Gluconate 2 % pads 2 each     PRN Meds:.    PMH:     Past Medical History:   Diagnosis Date   • Asthma    • Cancer (CMS/HCC)     MELANOMA REMOVED FROM NOSE AND LT SHOULDER   • Carpal tunnel syndrome     LT HAND   • Coronary artery disease    • Diabetes mellitus (CMS/HCC)     TYPE 2   • Diverticulosis of colon    • Factor 5 Leiden mutation, heterozygous (CMS/HCC)    • GERD (gastroesophageal reflux disease)    • H/O cornea transplant    • Hip pain, chronic, left    • History of melanoma excision     LT SHOULDER AND NOSE   • History of skin cancer    • History of stomach ulcers    • Hypertension    • IBS (irritable bowel syndrome)    • Infectious viral hepatitis     CHILDHOOD   • Joint pain    • Neuropathy     IN FEET AND HANDS   • Numbness or tingling     LEFT HAND   • PONV (postoperative nausea and vomiting)    • Right knee pain    • Ringing in ears    • Sleep apnea     CPAP   • Stiffness in joint        PF/Surg/Soc Hx:     Past Surgical History:   Procedure Laterality Date   • CARDIAC CATHETERIZATION      X2   • CARPAL TUNNEL RELEASE Left 4/20/2021    Procedure: CARPAL TUNNEL RELEASE;  Surgeon: Ren March MD;  Location:  [facility];  Service: Orthopedics;  Laterality: Left;   • CHOLECYSTECTOMY     • COLONOSCOPY     • CORNEAL TRANSPLANT      GARETT   • CORONARY ANGIOPLASTY  2015    and 2011   • EXCISION LESION Left     LT SHOULDER/  MELANOMA REMOVED   • KNEE ARTHROSCOPY Right 3/20/2018    Procedure: RIGHT KNEE ARTHROSCOPY, PARTIAL MEDIAL MENISECTOMY, ABRASSSION CHONDROPLASTY, AND LOOSE BODY REMOVAL;  Surgeon: Logan Roblero MD;  [place] [facility] [place] [facility]  Service: Orthopedics   • KNEE SURGERY Right 06/23/2015    SCOPE   • NASAL MASS EXCISION  2016    MELANOMA   • ROTATOR CUFF REPAIR Left 2016   • TOTAL KNEE ARTHROPLASTY Left 2/12/2019    Procedure: TOTAL KNEE ARTHROPLASTY;  Surgeon: Seth Floyd MD;  Location: [facility] OR;  Service: Orthopedics        Social History     Occupational History   • Not on file   Tobacco Use   • Smoking status: Never Smoker   • Smokeless tobacco: Never Used   Vaping Use   • Vaping Use: Never used   Substance and Sexual Activity   • Alcohol use: No   • Drug use: No   • Sexual activity: Defer      Social History     Social History Narrative   • Not on file        Family History   Problem Relation Age of Onset   • Stomach cancer Other    • Diabetes Other    • Heart disease Other    • Hypertension Other    • Malig Hyperthermia Neg Hx          Review of Systems:   A 14 point review of systems was performed, pertinent positives discussed above, all other systems are negative    Physical Exam: 76 y.o. male  Vital Signs :   Vitals:    06/10/21 1254   BP: 144/66   Temp: 96.6 °F (35.9 °C)   Weight: 94.8 kg (209 lb)   Height: 180.3 cm (71\")   PainSc:   4     General: Alert and Oriented x 3, No acute distress.  Psych: mood and affect appropriate; recent and remote memory intact  Eyes: conjunctiva clear; pupils equally round and reactive, sclera nonicteric  CV: no peripheral edema  Resp: normal respiratory effort  Skin: no rashes or wounds; normal turgor  Musculosketetal; pain and crepitance with knee " range of motion  Vascular: palpable distal pulses    Xrays:  -3 views (AP, lateral, and sunrise) were reviewed demonstrating end-stage OA with bone on bone articulation.  -A full length AP xray was ordered and reviewed today for purposes of operative alignment demonstrating end stage arthritic findings. There are no previous full length films for review    Assessment:  End-stage Right knee osteoarthritis. Conservative measures have failed.      Plan:  The plan is to proceed with Right Total Knee Replacement. The patient voiced understanding of the risks, benefits, and alternative forms of treatment that were discussed with Dr Floyd at the time of scheduling. 23     Nel Oliveira, APRN  6/10/2021

## 2021-06-10 NOTE — H&P
Patient: Robin Karimi    Date of Admission: 6/14/2021    YOB: 1945    Medical Record Number: [id number]    Admitting Physician: Dr. Seth Floyd    Reason for Admission: End Stage Right Knee OA    History of Present Illness: 76 y.o. male presents with severe end stage knee osteoarthritis which has not been responsive to the full compliment of conservative measures. Despite conservative attempts, there is still severe, constant activity limiting pain. Given the severity of the pain, the patient has elected to proceed with knee replacement.    Allergies:   Allergies   Allergen Reactions   • Benazepril-Hydrochlorothiazide Cough   • Zocor [Simvastatin] Other (See Comments)     MUSCLE PAIN         Current Medications:  Home Medications:    Current Outpatient Medications on File Prior to Visit   Medication Sig   • amLODIPine (NORVASC) 10 MG tablet Take 10 mg by mouth Daily.   • aspirin 81 MG EC tablet Take 81 mg by mouth Daily. TO HOLD 1 WEEK BEFORE SURGERY   • cetirizine (ZyrTEC) 10 MG tablet Take 10 mg by mouth Daily As Needed.   • docusate sodium (COLACE) 100 MG capsule Take 1 capsule by mouth 2 (Two) Times a Day. (Patient taking differently: Take 100 mg by mouth Daily As Needed.)   • glipiZIDE (GLUCOTROL) 10 MG tablet Take 10 mg by mouth 2 (Two) Times a Day Before Meals.   • hydroCHLOROthiazide (HYDRODIURIL) 25 MG tablet hydrochlorothiazide 25 mg oral tablet take one-half tablet (12.5 mg) by oral route 2 times per day   Active   • Insulin Detemir (LEVEMIR FLEXPEN SC) Inject 50 Units under the skin into the appropriate area as directed Every Evening.   • Lactobacillus (PROBIOTIC ACIDOPHILUS) capsule Take 1 tablet/day by mouth Take As Directed. TAKES 4 X A WEEK   • losartan-hydrochlorothiazide (HYZAAR) 50-12.5 MG per tablet Take 1 tablet by mouth Every Morning.   • loteprednol (LOTEMAX) 0.5 % ophthalmic suspension Administer 1 drop to both eyes Every Morning.   • metFORMIN (GLUCOPHAGE) 500 MG tablet Take  500 mg by mouth 2 (Two) Times a Day With Meals.   • metoprolol tartrate (LOPRESSOR) 50 MG tablet Take 50 mg by mouth 2 (Two) Times a Day.   • nitroglycerin (NITROSTAT) 0.4 MG SL tablet Place 0.4 mg under the tongue Every 5 (Five) Minutes As Needed for Chest Pain. Take no more than 3 doses in 15 minutes.   • pantoprazole (PROTONIX) 40 MG EC tablet Take 40 mg by mouth Every Morning.   • RELION PEN NEEDLE 31G/8MM 31G X 8 MM misc USE 1 ONCE DAILY WITH INSULIN INJECTION   • rosuvastatin (CRESTOR) 20 MG tablet Take 10 mg by mouth Every Night.   • cholecalciferol (VITAMIN D3) 1000 units tablet Take 1,000 Units by mouth Daily.   • vitamin B-12 (CYANOCOBALAMIN) 500 MCG tablet Take 500 mcg by mouth Daily.     Current Facility-Administered Medications on File Prior to Visit   Medication   • Chlorhexidine Gluconate 2 % pads 2 each     PRN Meds:.    PMH:     Past Medical History:   Diagnosis Date   • Asthma    • Cancer (CMS/HCC)     MELANOMA REMOVED FROM NOSE AND LT SHOULDER   • Carpal tunnel syndrome     LT HAND   • Coronary artery disease    • Diabetes mellitus (CMS/HCC)     TYPE 2   • Diverticulosis of colon    • Factor 5 Leiden mutation, heterozygous (CMS/HCC)    • GERD (gastroesophageal reflux disease)    • H/O cornea transplant    • Hip pain, chronic, left    • History of melanoma excision     LT SHOULDER AND NOSE   • History of skin cancer    • History of stomach ulcers    • Hypertension    • IBS (irritable bowel syndrome)    • Infectious viral hepatitis     CHILDHOOD   • Joint pain    • Neuropathy     IN FEET AND HANDS   • Numbness or tingling     LEFT HAND   • PONV (postoperative nausea and vomiting)    • Right knee pain    • Ringing in ears    • Sleep apnea     CPAP   • Stiffness in joint        PF/Surg/Soc Hx:     Past Surgical History:   Procedure Laterality Date   • CARDIAC CATHETERIZATION      X2   • CARPAL TUNNEL RELEASE Left 4/20/2021    Procedure: CARPAL TUNNEL RELEASE;  Surgeon: Ren March MD;  Location:  [facility];  Service: Orthopedics;  Laterality: Left;   • CHOLECYSTECTOMY     • COLONOSCOPY     • CORNEAL TRANSPLANT      GARETT   • CORONARY ANGIOPLASTY  2015    and 2011   • EXCISION LESION Left     LT SHOULDER/  MELANOMA REMOVED   • KNEE ARTHROSCOPY Right 3/20/2018    Procedure: RIGHT KNEE ARTHROSCOPY, PARTIAL MEDIAL MENISECTOMY, ABRASSSION CHONDROPLASTY, AND LOOSE BODY REMOVAL;  Surgeon: Logan Roblero MD;  [place] [facility] [place] [facility]  Service: Orthopedics   • KNEE SURGERY Right 06/23/2015    SCOPE   • NASAL MASS EXCISION  2016    MELANOMA   • ROTATOR CUFF REPAIR Left 2016   • TOTAL KNEE ARTHROPLASTY Left 2/12/2019    Procedure: TOTAL KNEE ARTHROPLASTY;  Surgeon: Seth Floyd MD;  Location: [facility] OR;  Service: Orthopedics        Social History     Occupational History   • Not on file   Tobacco Use   • Smoking status: Never Smoker   • Smokeless tobacco: Never Used   Vaping Use   • Vaping Use: Never used   Substance and Sexual Activity   • Alcohol use: No   • Drug use: No   • Sexual activity: Defer      Social History     Social History Narrative   • Not on file        Family History   Problem Relation Age of Onset   • Stomach cancer Other    • Diabetes Other    • Heart disease Other    • Hypertension Other    • Malig Hyperthermia Neg Hx          Review of Systems:   A 14 point review of systems was performed, pertinent positives discussed above, all other systems are negative    Physical Exam: 76 y.o. male  Vital Signs :   Vitals:    06/10/21 1254   BP: 144/66   Temp: 96.6 °F (35.9 °C)   Weight: 94.8 kg (209 lb)   Height: 180.3 cm (71\")   PainSc:   4     General: Alert and Oriented x 3, No acute distress.  Psych: mood and affect appropriate; recent and remote memory intact  Eyes: conjunctiva clear; pupils equally round and reactive, sclera nonicteric  CV: no peripheral edema  Resp: normal respiratory effort  Skin: no rashes or wounds; normal turgor  Musculosketetal; pain and crepitance with knee " range of motion  Vascular: palpable distal pulses    Xrays:  -3 views (AP, lateral, and sunrise) were reviewed demonstrating end-stage OA with bone on bone articulation.  -A full length AP xray was ordered and reviewed today for purposes of operative alignment demonstrating end stage arthritic findings. There are no previous full length films for review    Assessment:  End-stage Right knee osteoarthritis. Conservative measures have failed.      Plan:  The plan is to proceed with Right Total Knee Replacement. The patient voiced understanding of the risks, benefits, and alternative forms of treatment that were discussed with Dr Floyd at the time of scheduling. 23     Nel Oliveira, APRN  6/10/2021

## 2021-06-11 ENCOUNTER — APPOINTMENT (OUTPATIENT)
Dept: LAB | Facility: HOSPITAL | Age: 76
End: 2021-06-11

## 2021-06-11 ENCOUNTER — LAB (OUTPATIENT)
Dept: LAB | Facility: HOSPITAL | Age: 76
End: 2021-06-11

## 2021-06-11 DIAGNOSIS — Z01.818 PRE-OP TESTING: ICD-10-CM

## 2021-06-11 DIAGNOSIS — Z01.818 OTHER SPECIFIED PRE-OPERATIVE EXAMINATION: ICD-10-CM

## 2021-06-11 LAB — SARS-COV-2 ORF1AB RESP QL NAA+PROBE: NOT DETECTED

## 2021-06-11 PROCEDURE — C9803 HOPD COVID-19 SPEC COLLECT: HCPCS

## 2021-06-11 PROCEDURE — U0004 COV-19 TEST NON-CDC HGH THRU: HCPCS

## 2021-06-14 ENCOUNTER — ANESTHESIA (OUTPATIENT)
Dept: PERIOP | Facility: HOSPITAL | Age: 76
End: 2021-06-14

## 2021-06-14 ENCOUNTER — APPOINTMENT (OUTPATIENT)
Dept: GENERAL RADIOLOGY | Facility: HOSPITAL | Age: 76
End: 2021-06-14

## 2021-06-14 ENCOUNTER — ANESTHESIA EVENT (OUTPATIENT)
Dept: PERIOP | Facility: HOSPITAL | Age: 76
End: 2021-06-14

## 2021-06-14 ENCOUNTER — HOSPITAL ENCOUNTER (OUTPATIENT)
Facility: HOSPITAL | Age: 76
Setting detail: OBSERVATION
Discharge: HOME HEALTH | End: 2021-06-15
Attending: ORTHOPAEDIC SURGERY | Admitting: ORTHOPAEDIC SURGERY

## 2021-06-14 DIAGNOSIS — Z96.651 S/P TKR (TOTAL KNEE REPLACEMENT), RIGHT: Primary | ICD-10-CM

## 2021-06-14 DIAGNOSIS — M17.11 PRIMARY OSTEOARTHRITIS OF RIGHT KNEE: ICD-10-CM

## 2021-06-14 LAB
GLUCOSE BLDC GLUCOMTR-MCNC: 154 MG/DL (ref 70–130)
GLUCOSE BLDC GLUCOMTR-MCNC: 180 MG/DL (ref 70–130)
GLUCOSE BLDC GLUCOMTR-MCNC: 374 MG/DL (ref 70–130)

## 2021-06-14 PROCEDURE — 97110 THERAPEUTIC EXERCISES: CPT | Performed by: PHYSICAL THERAPIST

## 2021-06-14 PROCEDURE — G0378 HOSPITAL OBSERVATION PER HR: HCPCS

## 2021-06-14 PROCEDURE — 73560 X-RAY EXAM OF KNEE 1 OR 2: CPT

## 2021-06-14 PROCEDURE — 25010000002 PROPOFOL 10 MG/ML EMULSION: Performed by: NURSE ANESTHETIST, CERTIFIED REGISTERED

## 2021-06-14 PROCEDURE — C1713 ANCHOR/SCREW BN/BN,TIS/BN: HCPCS | Performed by: ORTHOPAEDIC SURGERY

## 2021-06-14 PROCEDURE — 63710000001 INSULIN LISPRO (HUMAN) PER 5 UNITS: Performed by: NURSE PRACTITIONER

## 2021-06-14 PROCEDURE — C1776 JOINT DEVICE (IMPLANTABLE): HCPCS | Performed by: ORTHOPAEDIC SURGERY

## 2021-06-14 PROCEDURE — 25010000003 CEFAZOLIN IN DEXTROSE 2-4 GM/100ML-% SOLUTION: Performed by: ORTHOPAEDIC SURGERY

## 2021-06-14 PROCEDURE — 25010000002 FENTANYL CITRATE (PF) 50 MCG/ML SOLUTION: Performed by: NURSE ANESTHETIST, CERTIFIED REGISTERED

## 2021-06-14 PROCEDURE — 27447 TOTAL KNEE ARTHROPLASTY: CPT | Performed by: NURSE PRACTITIONER

## 2021-06-14 PROCEDURE — C1889 IMPLANT/INSERT DEVICE, NOC: HCPCS | Performed by: ORTHOPAEDIC SURGERY

## 2021-06-14 PROCEDURE — C9290 INJ, BUPIVACAINE LIPOSOME: HCPCS | Performed by: ORTHOPAEDIC SURGERY

## 2021-06-14 PROCEDURE — 97162 PT EVAL MOD COMPLEX 30 MIN: CPT | Performed by: PHYSICAL THERAPIST

## 2021-06-14 PROCEDURE — 25010000003 BUPIVACAINE LIPOSOME 1.3 % SUSPENSION 20 ML VIAL: Performed by: ORTHOPAEDIC SURGERY

## 2021-06-14 PROCEDURE — 82962 GLUCOSE BLOOD TEST: CPT

## 2021-06-14 PROCEDURE — 25010000003 CEFAZOLIN IN DEXTROSE 2-4 GM/100ML-% SOLUTION: Performed by: NURSE PRACTITIONER

## 2021-06-14 PROCEDURE — 25010000002 DEXAMETHASONE PER 1 MG: Performed by: NURSE ANESTHETIST, CERTIFIED REGISTERED

## 2021-06-14 PROCEDURE — 25010000002 ONDANSETRON PER 1 MG: Performed by: NURSE ANESTHETIST, CERTIFIED REGISTERED

## 2021-06-14 PROCEDURE — 25010000002 VANCOMYCIN 10 G RECONSTITUTED SOLUTION: Performed by: ORTHOPAEDIC SURGERY

## 2021-06-14 PROCEDURE — 63710000001 INSULIN GLARGINE PER 5 UNITS: Performed by: NURSE PRACTITIONER

## 2021-06-14 PROCEDURE — 27447 TOTAL KNEE ARTHROPLASTY: CPT | Performed by: ORTHOPAEDIC SURGERY

## 2021-06-14 DEVICE — IMPLANTABLE DEVICE: Type: IMPLANTABLE DEVICE | Site: KNEE | Status: FUNCTIONAL

## 2021-06-14 DEVICE — VIOLET ANTIBACTERIAL POLYDIOXANONE, KNOTLESS TISSUE CONTROL DEVICE
Type: IMPLANTABLE DEVICE | Site: KNEE | Status: FUNCTIONAL
Brand: STRATAFIX

## 2021-06-14 DEVICE — LEGION CR HIGH FLEX XLPE SZ 5-6 9MM
Type: IMPLANTABLE DEVICE | Site: KNEE | Status: FUNCTIONAL
Brand: LEGION

## 2021-06-14 DEVICE — LEGION CRUCIATE RETAINING OXINIUM                                    FEMORAL SIZE 5 RIGHT
Type: IMPLANTABLE DEVICE | Site: KNEE | Status: FUNCTIONAL
Brand: LEGION

## 2021-06-14 DEVICE — GENESIS II BICONVEX PATELLA 32MM
Type: IMPLANTABLE DEVICE | Site: KNEE | Status: FUNCTIONAL
Brand: GENESIS II

## 2021-06-14 DEVICE — KNOTLESS TISSUE CONTROL DEVICE, UNDYED UNIDIRECTIONAL (ANTIBACTERIAL) SYNTHETIC ABSORBABLE DEVICE
Type: IMPLANTABLE DEVICE | Site: KNEE | Status: FUNCTIONAL
Brand: STRATAFIX

## 2021-06-14 DEVICE — GENESIS II NON-POROUS TIBIAL                                    BASEPLATE SIZE 5 RIGHT
Type: IMPLANTABLE DEVICE | Site: KNEE | Status: FUNCTIONAL
Brand: GENESIS II

## 2021-06-14 DEVICE — PALACOS® R IS A FAST-CURING, RADIOPAQUE, POLY(METHYL METHACRYLATE)-BASED BONE CEMENT.PALACOS ® R CONTAINS THE X-RAY CONTRAST MEDIUM ZIRCONIUM DIOXIDE. TO IMPROVE VISIBILITY IN THE SURGICAL FIELD PALACOS ® R HAS BEEN COLOURED WITH CHLOROPHYLL (E141). THE BONE CEMENT IS PREPARED DIRECTLY BEFORE USE BY MIXING A POLYMER POWDER COMPONENT WITH A LIQUID MONOMER COMPONENT. A DUCTILE DOUGH FORMS WHICH CURES WITHIN A FEW MINUTES.
Type: IMPLANTABLE DEVICE | Site: KNEE | Status: FUNCTIONAL
Brand: PALACOS®

## 2021-06-14 RX ORDER — HYDROCODONE BITARTRATE AND ACETAMINOPHEN 7.5; 325 MG/1; MG/1
1 TABLET ORAL EVERY 4 HOURS PRN
Status: DISCONTINUED | OUTPATIENT
Start: 2021-06-14 | End: 2021-06-15 | Stop reason: HOSPADM

## 2021-06-14 RX ORDER — HYDROMORPHONE HYDROCHLORIDE 1 MG/ML
0.5 INJECTION, SOLUTION INTRAMUSCULAR; INTRAVENOUS; SUBCUTANEOUS
Status: DISCONTINUED | OUTPATIENT
Start: 2021-06-14 | End: 2021-06-14 | Stop reason: HOSPADM

## 2021-06-14 RX ORDER — FENTANYL CITRATE 50 UG/ML
50 INJECTION, SOLUTION INTRAMUSCULAR; INTRAVENOUS
Status: DISCONTINUED | OUTPATIENT
Start: 2021-06-14 | End: 2021-06-14 | Stop reason: HOSPADM

## 2021-06-14 RX ORDER — INSULIN LISPRO 100 [IU]/ML
0-14 INJECTION, SOLUTION INTRAVENOUS; SUBCUTANEOUS
Status: DISCONTINUED | OUTPATIENT
Start: 2021-06-15 | End: 2021-06-15 | Stop reason: HOSPADM

## 2021-06-14 RX ORDER — NITROGLYCERIN 0.4 MG/1
0.4 TABLET SUBLINGUAL
Status: DISCONTINUED | OUTPATIENT
Start: 2021-06-14 | End: 2021-06-15 | Stop reason: HOSPADM

## 2021-06-14 RX ORDER — LIDOCAINE HYDROCHLORIDE 10 MG/ML
0.5 INJECTION, SOLUTION EPIDURAL; INFILTRATION; INTRACAUDAL; PERINEURAL ONCE AS NEEDED
Status: DISCONTINUED | OUTPATIENT
Start: 2021-06-14 | End: 2021-06-14 | Stop reason: HOSPADM

## 2021-06-14 RX ORDER — LABETALOL HYDROCHLORIDE 5 MG/ML
5 INJECTION, SOLUTION INTRAVENOUS
Status: DISCONTINUED | OUTPATIENT
Start: 2021-06-14 | End: 2021-06-14 | Stop reason: HOSPADM

## 2021-06-14 RX ORDER — CEFAZOLIN SODIUM 2 G/100ML
2 INJECTION, SOLUTION INTRAVENOUS EVERY 8 HOURS
Status: COMPLETED | OUTPATIENT
Start: 2021-06-14 | End: 2021-06-15

## 2021-06-14 RX ORDER — FLUMAZENIL 0.1 MG/ML
0.2 INJECTION INTRAVENOUS AS NEEDED
Status: DISCONTINUED | OUTPATIENT
Start: 2021-06-14 | End: 2021-06-14 | Stop reason: HOSPADM

## 2021-06-14 RX ORDER — DIPHENHYDRAMINE HYDROCHLORIDE 50 MG/ML
12.5 INJECTION INTRAMUSCULAR; INTRAVENOUS
Status: DISCONTINUED | OUTPATIENT
Start: 2021-06-14 | End: 2021-06-14 | Stop reason: HOSPADM

## 2021-06-14 RX ORDER — TRANEXAMIC ACID 100 MG/ML
INJECTION, SOLUTION INTRAVENOUS AS NEEDED
Status: DISCONTINUED | OUTPATIENT
Start: 2021-06-14 | End: 2021-06-14 | Stop reason: SURG

## 2021-06-14 RX ORDER — INSULIN LISPRO 100 [IU]/ML
8 INJECTION, SOLUTION INTRAVENOUS; SUBCUTANEOUS ONCE
Status: COMPLETED | OUTPATIENT
Start: 2021-06-14 | End: 2021-06-14

## 2021-06-14 RX ORDER — DEXAMETHASONE SODIUM PHOSPHATE 4 MG/ML
INJECTION, SOLUTION INTRA-ARTICULAR; INTRALESIONAL; INTRAMUSCULAR; INTRAVENOUS; SOFT TISSUE AS NEEDED
Status: DISCONTINUED | OUTPATIENT
Start: 2021-06-14 | End: 2021-06-14 | Stop reason: SURG

## 2021-06-14 RX ORDER — NALOXONE HCL 0.4 MG/ML
0.2 VIAL (ML) INJECTION AS NEEDED
Status: DISCONTINUED | OUTPATIENT
Start: 2021-06-14 | End: 2021-06-14 | Stop reason: HOSPADM

## 2021-06-14 RX ORDER — INSULIN GLARGINE 100 [IU]/ML
50 INJECTION, SOLUTION SUBCUTANEOUS NIGHTLY
Status: DISCONTINUED | OUTPATIENT
Start: 2021-06-14 | End: 2021-06-15 | Stop reason: HOSPADM

## 2021-06-14 RX ORDER — SODIUM CHLORIDE 0.9 % (FLUSH) 0.9 %
3 SYRINGE (ML) INJECTION EVERY 12 HOURS SCHEDULED
Status: DISCONTINUED | OUTPATIENT
Start: 2021-06-14 | End: 2021-06-14 | Stop reason: HOSPADM

## 2021-06-14 RX ORDER — SODIUM CHLORIDE 0.9 % (FLUSH) 0.9 %
3-10 SYRINGE (ML) INJECTION AS NEEDED
Status: DISCONTINUED | OUTPATIENT
Start: 2021-06-14 | End: 2021-06-14 | Stop reason: HOSPADM

## 2021-06-14 RX ORDER — NICOTINE POLACRILEX 4 MG
15 LOZENGE BUCCAL
Status: DISCONTINUED | OUTPATIENT
Start: 2021-06-14 | End: 2021-06-15 | Stop reason: HOSPADM

## 2021-06-14 RX ORDER — SODIUM CHLORIDE, SODIUM LACTATE, POTASSIUM CHLORIDE, CALCIUM CHLORIDE 600; 310; 30; 20 MG/100ML; MG/100ML; MG/100ML; MG/100ML
9 INJECTION, SOLUTION INTRAVENOUS CONTINUOUS
Status: DISCONTINUED | OUTPATIENT
Start: 2021-06-14 | End: 2021-06-14 | Stop reason: HOSPADM

## 2021-06-14 RX ORDER — HYDROCODONE BITARTRATE AND ACETAMINOPHEN 7.5; 325 MG/1; MG/1
2 TABLET ORAL EVERY 4 HOURS PRN
Status: DISCONTINUED | OUTPATIENT
Start: 2021-06-14 | End: 2021-06-15 | Stop reason: HOSPADM

## 2021-06-14 RX ORDER — MELOXICAM 15 MG/1
15 TABLET ORAL DAILY
Status: DISCONTINUED | OUTPATIENT
Start: 2021-06-15 | End: 2021-06-15 | Stop reason: HOSPADM

## 2021-06-14 RX ORDER — DIPHENHYDRAMINE HCL 25 MG
25 CAPSULE ORAL
Status: DISCONTINUED | OUTPATIENT
Start: 2021-06-14 | End: 2021-06-14 | Stop reason: HOSPADM

## 2021-06-14 RX ORDER — INSULIN LISPRO 100 [IU]/ML
0-14 INJECTION, SOLUTION INTRAVENOUS; SUBCUTANEOUS ONCE
Status: COMPLETED | OUTPATIENT
Start: 2021-06-15 | End: 2021-06-15

## 2021-06-14 RX ORDER — HYDROCODONE BITARTRATE AND ACETAMINOPHEN 5; 325 MG/1; MG/1
1 TABLET ORAL ONCE AS NEEDED
Status: DISCONTINUED | OUTPATIENT
Start: 2021-06-14 | End: 2021-06-14 | Stop reason: HOSPADM

## 2021-06-14 RX ORDER — MAGNESIUM HYDROXIDE 1200 MG/15ML
LIQUID ORAL AS NEEDED
Status: DISCONTINUED | OUTPATIENT
Start: 2021-06-14 | End: 2021-06-14 | Stop reason: HOSPADM

## 2021-06-14 RX ORDER — GLIPIZIDE 10 MG/1
10 TABLET ORAL
Status: DISCONTINUED | OUTPATIENT
Start: 2021-06-14 | End: 2021-06-14

## 2021-06-14 RX ORDER — FAMOTIDINE 10 MG/ML
20 INJECTION, SOLUTION INTRAVENOUS ONCE
Status: COMPLETED | OUTPATIENT
Start: 2021-06-14 | End: 2021-06-14

## 2021-06-14 RX ORDER — ASPIRIN 81 MG/1
81 TABLET ORAL EVERY 12 HOURS SCHEDULED
Status: DISCONTINUED | OUTPATIENT
Start: 2021-06-15 | End: 2021-06-15 | Stop reason: HOSPADM

## 2021-06-14 RX ORDER — INSULIN LISPRO 100 [IU]/ML
0-9 INJECTION, SOLUTION INTRAVENOUS; SUBCUTANEOUS
Status: DISCONTINUED | OUTPATIENT
Start: 2021-06-15 | End: 2021-06-14

## 2021-06-14 RX ORDER — PROMETHAZINE HYDROCHLORIDE 25 MG/1
25 SUPPOSITORY RECTAL ONCE AS NEEDED
Status: DISCONTINUED | OUTPATIENT
Start: 2021-06-14 | End: 2021-06-14 | Stop reason: HOSPADM

## 2021-06-14 RX ORDER — POLYETHYLENE GLYCOL 3350 17 G/17G
17 POWDER, FOR SOLUTION ORAL 2 TIMES DAILY
Status: DISCONTINUED | OUTPATIENT
Start: 2021-06-14 | End: 2021-06-15 | Stop reason: HOSPADM

## 2021-06-14 RX ORDER — PROPOFOL 10 MG/ML
VIAL (ML) INTRAVENOUS CONTINUOUS PRN
Status: DISCONTINUED | OUTPATIENT
Start: 2021-06-14 | End: 2021-06-14

## 2021-06-14 RX ORDER — AMLODIPINE BESYLATE 10 MG/1
10 TABLET ORAL DAILY
Status: DISCONTINUED | OUTPATIENT
Start: 2021-06-15 | End: 2021-06-15 | Stop reason: HOSPADM

## 2021-06-14 RX ORDER — LIDOCAINE HYDROCHLORIDE 20 MG/ML
INJECTION, SOLUTION INFILTRATION; PERINEURAL AS NEEDED
Status: DISCONTINUED | OUTPATIENT
Start: 2021-06-14 | End: 2021-06-14 | Stop reason: SURG

## 2021-06-14 RX ORDER — ONDANSETRON 4 MG/1
4 TABLET, FILM COATED ORAL EVERY 6 HOURS PRN
Status: DISCONTINUED | OUTPATIENT
Start: 2021-06-14 | End: 2021-06-15 | Stop reason: HOSPADM

## 2021-06-14 RX ORDER — ONDANSETRON 2 MG/ML
INJECTION INTRAMUSCULAR; INTRAVENOUS AS NEEDED
Status: DISCONTINUED | OUTPATIENT
Start: 2021-06-14 | End: 2021-06-14 | Stop reason: SURG

## 2021-06-14 RX ORDER — ONDANSETRON 2 MG/ML
4 INJECTION INTRAMUSCULAR; INTRAVENOUS ONCE AS NEEDED
Status: DISCONTINUED | OUTPATIENT
Start: 2021-06-14 | End: 2021-06-14 | Stop reason: HOSPADM

## 2021-06-14 RX ORDER — DOCUSATE SODIUM 100 MG/1
100 CAPSULE, LIQUID FILLED ORAL DAILY PRN
Status: DISCONTINUED | OUTPATIENT
Start: 2021-06-14 | End: 2021-06-15 | Stop reason: HOSPADM

## 2021-06-14 RX ORDER — ONDANSETRON 2 MG/ML
4 INJECTION INTRAMUSCULAR; INTRAVENOUS EVERY 6 HOURS PRN
Status: DISCONTINUED | OUTPATIENT
Start: 2021-06-14 | End: 2021-06-15 | Stop reason: HOSPADM

## 2021-06-14 RX ORDER — MIDAZOLAM HYDROCHLORIDE 1 MG/ML
0.5 INJECTION INTRAMUSCULAR; INTRAVENOUS
Status: DISCONTINUED | OUTPATIENT
Start: 2021-06-14 | End: 2021-06-14 | Stop reason: HOSPADM

## 2021-06-14 RX ORDER — PROPOFOL 10 MG/ML
VIAL (ML) INTRAVENOUS AS NEEDED
Status: DISCONTINUED | OUTPATIENT
Start: 2021-06-14 | End: 2021-06-14 | Stop reason: SURG

## 2021-06-14 RX ORDER — METOPROLOL TARTRATE 50 MG/1
50 TABLET, FILM COATED ORAL 2 TIMES DAILY
Status: DISCONTINUED | OUTPATIENT
Start: 2021-06-14 | End: 2021-06-15 | Stop reason: HOSPADM

## 2021-06-14 RX ORDER — PANTOPRAZOLE SODIUM 40 MG/1
40 TABLET, DELAYED RELEASE ORAL EVERY MORNING
Status: DISCONTINUED | OUTPATIENT
Start: 2021-06-15 | End: 2021-06-15 | Stop reason: HOSPADM

## 2021-06-14 RX ORDER — EPHEDRINE SULFATE 50 MG/ML
5 INJECTION, SOLUTION INTRAVENOUS ONCE AS NEEDED
Status: DISCONTINUED | OUTPATIENT
Start: 2021-06-14 | End: 2021-06-14 | Stop reason: HOSPADM

## 2021-06-14 RX ORDER — MELOXICAM 15 MG/1
15 TABLET ORAL ONCE
Status: COMPLETED | OUTPATIENT
Start: 2021-06-14 | End: 2021-06-14

## 2021-06-14 RX ORDER — BUPIVACAINE HYDROCHLORIDE 7.5 MG/ML
INJECTION, SOLUTION EPIDURAL; RETROBULBAR
Status: COMPLETED | OUTPATIENT
Start: 2021-06-14 | End: 2021-06-14

## 2021-06-14 RX ORDER — UREA 10 %
3 LOTION (ML) TOPICAL NIGHTLY PRN
Status: DISCONTINUED | OUTPATIENT
Start: 2021-06-14 | End: 2021-06-15 | Stop reason: HOSPADM

## 2021-06-14 RX ORDER — PROMETHAZINE HYDROCHLORIDE 25 MG/1
25 TABLET ORAL ONCE AS NEEDED
Status: DISCONTINUED | OUTPATIENT
Start: 2021-06-14 | End: 2021-06-14 | Stop reason: HOSPADM

## 2021-06-14 RX ORDER — OXYCODONE AND ACETAMINOPHEN 7.5; 325 MG/1; MG/1
2 TABLET ORAL EVERY 4 HOURS PRN
Status: DISCONTINUED | OUTPATIENT
Start: 2021-06-14 | End: 2021-06-14 | Stop reason: HOSPADM

## 2021-06-14 RX ORDER — CEFAZOLIN SODIUM 2 G/100ML
2 INJECTION, SOLUTION INTRAVENOUS ONCE
Status: COMPLETED | OUTPATIENT
Start: 2021-06-14 | End: 2021-06-14

## 2021-06-14 RX ORDER — DEXTROSE MONOHYDRATE 25 G/50ML
25 INJECTION, SOLUTION INTRAVENOUS
Status: DISCONTINUED | OUTPATIENT
Start: 2021-06-14 | End: 2021-06-15 | Stop reason: HOSPADM

## 2021-06-14 RX ORDER — PREGABALIN 75 MG/1
150 CAPSULE ORAL ONCE
Status: COMPLETED | OUTPATIENT
Start: 2021-06-14 | End: 2021-06-14

## 2021-06-14 RX ADMIN — CEFAZOLIN SODIUM 2 G: 2 INJECTION, SOLUTION INTRAVENOUS at 16:50

## 2021-06-14 RX ADMIN — VANCOMYCIN HYDROCHLORIDE 1500 MG: 10 INJECTION, POWDER, LYOPHILIZED, FOR SOLUTION INTRAVENOUS at 08:07

## 2021-06-14 RX ADMIN — FENTANYL CITRATE 50 MCG: 50 INJECTION, SOLUTION INTRAMUSCULAR; INTRAVENOUS at 12:10

## 2021-06-14 RX ADMIN — PROPOFOL 180 MCG/KG/MIN: 10 INJECTION, EMULSION INTRAVENOUS at 09:52

## 2021-06-14 RX ADMIN — LOSARTAN POTASSIUM: 50 TABLET, FILM COATED ORAL at 14:38

## 2021-06-14 RX ADMIN — HYDROCODONE BITARTRATE AND ACETAMINOPHEN 1 TABLET: 7.5; 325 TABLET ORAL at 21:28

## 2021-06-14 RX ADMIN — GLIPIZIDE 10 MG: 10 TABLET ORAL at 16:50

## 2021-06-14 RX ADMIN — BUPIVACAINE HYDROCHLORIDE 1.8 ML: 7.5 INJECTION, SOLUTION EPIDURAL; RETROBULBAR at 09:46

## 2021-06-14 RX ADMIN — CEFAZOLIN SODIUM 2 G: 2 INJECTION, SOLUTION INTRAVENOUS at 09:28

## 2021-06-14 RX ADMIN — DEXAMETHASONE SODIUM PHOSPHATE 8 MG: 4 INJECTION, SOLUTION INTRAMUSCULAR; INTRAVENOUS at 10:18

## 2021-06-14 RX ADMIN — MELOXICAM 15 MG: 15 TABLET ORAL at 08:10

## 2021-06-14 RX ADMIN — POLYETHYLENE GLYCOL 3350 17 G: 17 POWDER, FOR SOLUTION ORAL at 22:10

## 2021-06-14 RX ADMIN — INSULIN LISPRO 8 UNITS: 100 INJECTION, SOLUTION INTRAVENOUS; SUBCUTANEOUS at 22:10

## 2021-06-14 RX ADMIN — ONDANSETRON 4 MG: 2 INJECTION INTRAMUSCULAR; INTRAVENOUS at 11:10

## 2021-06-14 RX ADMIN — PREGABALIN 150 MG: 75 CAPSULE ORAL at 08:10

## 2021-06-14 RX ADMIN — FAMOTIDINE 20 MG: 10 INJECTION INTRAVENOUS at 08:10

## 2021-06-14 RX ADMIN — SODIUM CHLORIDE, POTASSIUM CHLORIDE, SODIUM LACTATE AND CALCIUM CHLORIDE 9 ML/HR: 600; 310; 30; 20 INJECTION, SOLUTION INTRAVENOUS at 08:01

## 2021-06-14 RX ADMIN — INSULIN GLARGINE 50 UNITS: 100 INJECTION, SOLUTION SUBCUTANEOUS at 22:17

## 2021-06-14 RX ADMIN — PROPOFOL 70 MG: 10 INJECTION, EMULSION INTRAVENOUS at 09:51

## 2021-06-14 RX ADMIN — TRANEXAMIC ACID 1000 MG: 1 INJECTION, SOLUTION INTRAVENOUS at 10:49

## 2021-06-14 RX ADMIN — LIDOCAINE HYDROCHLORIDE 100 MG: 20 INJECTION, SOLUTION INFILTRATION; PERINEURAL at 09:50

## 2021-06-14 RX ADMIN — MUPIROCIN 1 APPLICATION: 20 OINTMENT TOPICAL at 22:10

## 2021-06-14 RX ADMIN — METOPROLOL TARTRATE 50 MG: 50 TABLET, FILM COATED ORAL at 22:09

## 2021-06-14 RX ADMIN — HYDROCODONE BITARTRATE AND ACETAMINOPHEN 1 TABLET: 7.5; 325 TABLET ORAL at 15:53

## 2021-06-14 RX ADMIN — METFORMIN HYDROCHLORIDE 500 MG: 500 TABLET ORAL at 16:50

## 2021-06-14 NOTE — ANESTHESIA PROCEDURE NOTES
Spinal Block      Patient reassessed immediately prior to procedure    Patient location during procedure: OR  Start Time: 6/14/2021 9:42 AM  Stop Time: 6/14/2021 9:46 AM  Indication:at surgeon's request  Performed By  Anesthesiologist: Parminder Dorman MD  CRNA: Shanae Rodriguez CRNA  Preanesthetic Checklist  Completed: patient identified, IV checked, site marked, risks and benefits discussed, surgical consent, monitors and equipment checked, pre-op evaluation and timeout performed  Spinal Block Prep:  Patient Position:sitting  Sterile Tech:gloves, cap, mask and sterile barriers  Prep:Chloraprep  Patient Monitoring:blood pressure monitoring, continuous pulse oximetry and EKG  Spinal Block Procedure  Approach:midline  Guidance:landmark technique and palpation technique  Location:L4-L5  Needle Type:Pencan  Needle Gauge:24 G  Placement of Spinal needle event:cerebrospinal fluid aspirated  Paresthesia: no  Fluid Appearance:clear  Medications: bupivacaine PF (MARCAINE) 0.75 % injection, 1.8 mL  Med Administered at 6/14/2021 9:46 AM   Post Assessment  Patient Tolerance:patient tolerated the procedure well with no apparent complications  Complications no

## 2021-06-14 NOTE — PLAN OF CARE
Goal Outcome Evaluation:  Plan of Care Reviewed With: patient        Progress: improving  Outcome Summary: pod 0 RTKA with HV intact and KRYSTINA. vss. dsg cdi. ambulates with x2 assist and walker dt spinal. voiding per urinal with x1 incontinent episode today. pain tolerated with po prn meds. plan to dc home tomorrow if appropriate. educated on bp monitoring.

## 2021-06-14 NOTE — ANESTHESIA POSTPROCEDURE EVALUATION
Patient: Robin Karimi    Procedure Summary     Date: 06/14/21 Room / Location: [facility] OR [address] [place] [address] MAIN OR    Anesthesia Start: 0933 Anesthesia Stop: 1147    Procedure: TOTAL KNEE ARTHROPLASTY (Right Knee) Diagnosis:       Primary osteoarthritis of right knee      (Primary osteoarthritis of right knee [M17.11])    Surgeons: Seth Floyd MD Provider: Parminder Dorman MD    Anesthesia Type: spinal ASA Status: 3          Anesthesia Type: spinal    Vitals  Vitals Value Taken Time   /58 06/14/21 1245   Temp 36.6 °C (97.8 °F) 06/14/21 1230   Pulse 54 06/14/21 1246   Resp 16 06/14/21 1230   SpO2 97 % 06/14/21 1247   Vitals shown include unvalidated device data.        Post Anesthesia Care and Evaluation    Patient location during evaluation: PACU  Patient participation: complete - patient participated  Level of consciousness: lethargic  Pain management: adequate  Airway patency: patent  Anesthetic complications: No anesthetic complications    Cardiovascular status: acceptable  Respiratory status: acceptable  Hydration status: acceptable    Comments: --------------------            06/14/21               1245     --------------------   BP:       121/58     Pulse:      51       Resp:                Temp:                SpO2:      98%      --------------------

## 2021-06-14 NOTE — ANESTHESIA PREPROCEDURE EVALUATION
Anesthesia Evaluation     Patient summary reviewed and Nursing notes reviewed   history of anesthetic complications: PONV  NPO Solid Status: > 8 hours             Airway   Mallampati: II  Neck ROM: full  No difficulty expected  Dental - normal exam     Pulmonary     breath sounds clear to auscultation  (+) asthma,sleep apnea,   Cardiovascular     Rhythm: regular    (+) hypertension, CAD,       Neuro/Psych  (+) numbness,     GI/Hepatic/Renal/Endo    (+)  GERD,  hepatitis, liver disease, diabetes mellitus,     Musculoskeletal     Abdominal    Substance History      OB/GYN          Other                        Anesthesia Plan    ASA 3     spinal     intravenous induction     Anesthetic plan, all risks, benefits, and alternatives have been provided, discussed and informed consent has been obtained with: patient.

## 2021-06-14 NOTE — THERAPY EVALUATION
Patient Name: Robin Karimi  : 1945    MRN: [id number]                              Today's Date: 2021       Admit Date: 2021    Visit Dx:     ICD-10-CM ICD-9-CM   1. Primary osteoarthritis of right knee  M17.11 715.16     Patient Active Problem List   Diagnosis   • Complete tear of left rotator cuff   • Impingement syndrome, shoulder   • Primary localized osteoarthrosis of right lower leg   • Tear of medial meniscus of right knee, current   • Chondromalacia of right knee   • Tear of medial meniscus of right knee   • Chondromalacia, left knee   • Arthritis of both knees   • Primary osteoarthritis of right knee   • Primary osteoarthritis of left knee   • Sleep apnea- CPAP   • Hypertension   • Factor 5 Leiden mutation, heterozygous   • Diabetes mellitus type 2, controlled    • Left carpal tunnel syndrome   • Acute pain of both knees   • OA (osteoarthritis) of knee     Past Medical History:   Diagnosis Date   • Asthma    • Cancer (CMS/HCC)     MELANOMA REMOVED FROM NOSE AND LT SHOULDER   • Carpal tunnel syndrome     LT HAND   • Coronary artery disease    • Diabetes mellitus (CMS/HCC)     TYPE 2   • Diverticulosis of colon    • Factor 5 Leiden mutation, heterozygous (CMS/HCC)    • GERD (gastroesophageal reflux disease)    • H/O cornea transplant    • Hip pain, chronic, left    • History of melanoma excision     LT SHOULDER AND NOSE   • History of skin cancer    • History of stomach ulcers    • Hypertension    • IBS (irritable bowel syndrome)    • Infectious viral hepatitis     CHILDHOOD   • Joint pain    • Neuropathy     IN FEET AND HANDS   • Numbness or tingling     LEFT HAND   • PONV (postoperative nausea and vomiting)    • Right knee pain    • Ringing in ears    • Sleep apnea     CPAP   • Stiffness in joint      Past Surgical History:   Procedure Laterality Date   • CARDIAC CATHETERIZATION      X2   • CARPAL TUNNEL RELEASE Left 2021    Procedure: CARPAL TUNNEL RELEASE;  Surgeon: Ren March  MD ELIZABETH;  Location:  [facility];  Service: Orthopedics;  Laterality: Left;   • CHOLECYSTECTOMY     • COLONOSCOPY     • CORNEAL TRANSPLANT      GARETT   • CORONARY ANGIOPLASTY  2015    and 2011   • EXCISION LESION Left     LT SHOULDER/  MELANOMA REMOVED   • KNEE ARTHROSCOPY Right 3/20/2018    Procedure: RIGHT KNEE ARTHROSCOPY, PARTIAL MEDIAL MENISECTOMY, ABRASSSION CHONDROPLASTY, AND LOOSE BODY REMOVAL;  Surgeon: Logan Roblero MD;  Location:  [facility];  Service: Orthopedics   • KNEE SURGERY Right 06/23/2015    SCOPE   • NASAL MASS EXCISION  2016    MELANOMA   • ROTATOR CUFF REPAIR Left 2016   • TOTAL KNEE ARTHROPLASTY Left 2/12/2019    Procedure: TOTAL KNEE ARTHROPLASTY;  Surgeon: Seth Floyd MD;  Location: [facility] MAIN OR;  Service: Orthopedics     General Information     Row Name 06/14/21 1643          Physical Therapy Time and Intention    Document Type  evaluation  -     Mode of Treatment  individual therapy;physical therapy  -     Row Name 06/14/21 1643          General Information    Prior Level of Function  independent:  -     Existing Precautions/Restrictions  fall  -     Barriers to Rehab  none identified  -[facility] Name 06/14/21 1643          Living Environment    Lives With  spouse  -     Row Name 06/14/21 1643          Home Main Entrance    Number of Stairs, Main Entrance  none  -     Row Name 06/14/21 1643          Cognition    Orientation Status (Cognition)  oriented to;person;place  -[facility] Name 06/14/21 1643          Safety Issues, Functional Mobility    Impairments Affecting Function (Mobility)  endurance/activity tolerance;range of motion (ROM);pain;balance  -       User Key  (r) = Recorded By, (t) = Taken By, (c) = Cosigned By    Initials Name Provider Type     Dejah Alcantar, PT Physical Therapist        Mobility     Row Name 06/14/21 1643          Bed Mobility    Bed Mobility  supine-sit  -     Supine-Sit [place] (Bed Mobility)  minimum assist  (75% patient effort)  -     Assistive Device (Bed Mobility)  bed rails  -[facility] Name 06/14/21 1643          Transfers    Comment (Transfers)  forward lean, unsteady, pivoting to the chair, c/o dizziness  -[facility] Name 06/14/21 1643          Bed-Chair Transfer    Bed-Chair [place] (Transfers)  moderate assist (50% patient effort)  -     Assistive Device (Bed-Chair Transfers)  walker, front-wheeled  -[facility] Name 06/14/21 1643          Sit-Stand Transfer    Sit-Stand [place] (Transfers)  minimum assist (75% patient effort)  -     Assistive Device (Sit-Stand Transfers)  walker, front-wheeled  -     Row Name 06/14/21 1643          Gait/Stairs (Locomotion)    Comment (Gait/Stairs)  very unsteady pivoting to the chair, not safe to ambulate secondary to dizziness  -       User Key  (r) = Recorded By, (t) = Taken By, (c) = Cosigned By    Initials Name Provider Type    Dejah Manzano, PT Physical Therapist        Obj/Interventions     [facility] Name 06/14/21 1644          Range of Motion Comprehensive    Comment, General Range of Motion  R knee 15-70  -KH     Row Name 06/14/21 1644          Strength Comprehensive (MMT)    Comment, General Manual Muscle Testing (MMT) Assessment  BLE WFL  -KH     Row Name 06/14/21 1644          Motor Skills    Therapeutic Exercise  knee R TKA protocol x 10 reps  -       User Key  (r) = Recorded By, (t) = Taken By, (c) = Cosigned By    Initials Name Provider Type    Dejah Manzano, PT Physical Therapist        Goals/Plan     Row Name 06/14/21 1646          Bed Mobility Goal 1 (PT)    Activity/Assistive Device (Bed Mobility Goal 1, PT)  bed mobility activities, all  -     [place] Level/Cues Needed (Bed Mobility Goal 1, PT)  standby assist  -     Time Frame (Bed Mobility Goal 1, PT)  3 days  -KH     Row Name 06/14/21 1646          Transfer Goal 1 (PT)    Activity/Assistive Device (Transfer Goal 1, PT)  transfers, all;walker, rolling  -      [place] Level/Cues Needed (Transfer Goal 1, PT)  standby assist  -     Time Frame (Transfer Goal 1, PT)  3 days  -     Row Name 06/14/21 1646          Gait Training Goal 1 (PT)    Activity/Assistive Device (Gait Training Goal 1, PT)  gait (walking locomotion);walker, rolling  -     [place] Level (Gait Training Goal 1, PT)  standby assist  -     Distance (Gait Training Goal 1, PT)  75  -KH     Time Frame (Gait Training Goal 1, PT)  3 days  -     Row Name 06/14/21 1646          ROM Goal 1 (PT)    ROM Goal 1 (PT)  R knee 0-90  -     Row Name 06/14/21 1646          Patient Education Goal (PT)    Activity (Patient Education Goal, PT)  HEP  -     [place]/Cues/Accuracy (Memory Goal 2, PT)  demonstrates adequately  -KH     Time Frame (Patient Education Goal, PT)  3 days  -       User Key  (r) = Recorded By, (t) = Taken By, (c) = Cosigned By    Initials Name Provider Type    Dejah Manzano, PT Physical Therapist        Clinical Impression     Row Name 06/14/21 1644          Pain    Additional Documentation  Pain Scale: Numbers Pre/Post-Treatment (Group)  -KH     Row Name 06/14/21 1644          Pain Scale: Numbers Pre/Post-Treatment    Pretreatment Pain Rating  3/10  -     Posttreatment Pain Rating  4/10  -     Pain Location - Side  Right  -     Pain Location  knee  -     Pain Intervention(s)  Repositioned;Cold applied;Ambulation/increased activity  -[facility] Name 06/14/21 1644          Plan of Care Review    Plan of Care Reviewed With  patient  -     Outcome Summary  Pt is s/p R TKA and presents with limited ROM , impaired balance and  antalgic gait. Pt plans to d/c home and has 0 steps to enter. Pt was unsteady when standing and pivoting to the chair with mod A. He completed ROM with assist and heavy cueing. Pt was veryletharigc and had difficulty staying awake. PT  will follow for ROM, gait and stair training.  -     Row Name 06/14/21 5265          Therapy  Assessment/Plan (PT)    Patient/Family Therapy Goals Statement (PT)  homwe with spouse  -MARCO     Rehab Potential (PT)  good, to achieve stated therapy goals  -     Criteria for Skilled Interventions Met (PT)  yes  -     Row Name 06/14/21 1644          Positioning and Restraints    Pre-Treatment Position  in bed  -KH     Post Treatment Position  chair  -KH     In Chair  reclined;call light within reach;encouraged to call for assist;exit alarm on;with family/caregiver;notified nsg  -       User Key  (r) = Recorded By, (t) = Taken By, (c) = Cosigned By    Initials Name Provider Type    Dejah Manzano, PT Physical Therapist        Outcome Measures     Row Name 06/14/21 1647          How much help from another person do you currently need...    Turning from your back to your side while in flat bed without using bedrails?  4  -KH     Moving from lying on back to sitting on the side of a flat bed without bedrails?  3  -KH     Moving to and from a bed to a chair (including a wheelchair)?  2  -KH     Standing up from a chair using your arms (e.g., wheelchair, bedside chair)?  2  -KH     Climbing 3-5 steps with a railing?  1  -KH     To walk in hospital room?  1  -KH     AM-PAC 6 Clicks Score (PT)  13  -     Row Name 06/14/21 1647          Functional Assessment    Outcome Measure Options  AM-PAC 6 Clicks Basic Mobility (PT)  -       User Key  (r) = Recorded By, (t) = Taken By, (c) = Cosigned By    Initials Name Provider Type    Dejah Manzano, PT Physical Therapist        Physical Therapy Education                 Title: PT OT SLP Therapies (In Progress)     Topic: Physical Therapy (In Progress)     Point: Mobility training (In Progress)     Learning Progress Summary           Patient Acceptance, E,D, NR by MARCO at 6/14/2021 1647                   Point: Home exercise program (In Progress)     Learning Progress Summary           Patient Acceptance, E,D, NR by MARCO at 6/14/2021 1647                    Point: Body mechanics (In Progress)     Learning Progress Summary           Patient Acceptance, E,D, NR by  at 6/14/2021 1647                   Point: Precautions (In Progress)     Learning Progress Summary           Patient Acceptance, E,D, NR by  at 6/14/2021 1647                               User Key     Initials Effective Dates Name Provider Type Discipline     02/05/19 -  Dejah Alcantar PT Physical Therapist PT              PT Recommendation and Plan     Plan of Care Reviewed With: patient  Outcome Summary: Pt is s/p R TKA and presents with limited ROM , impaired balance and  antalgic gait. Pt plans to d/c home and has 0 steps to enter. Pt was unsteady when standing and pivoting to the chair with mod A. He completed ROM with assist and heavy cueing. Pt was veryletharigc and had difficulty staying awake. PT  will follow for ROM, gait and stair training.     Time Calculation:   PT Charges     Row Name 06/14/21 1648             Time Calculation    Start Time  1530  -      Stop Time  1550  -KH      Time Calculation (min)  20 min  -KH         Time Calculation- PT    Total Timed Code Minutes- PT  12 minute(s)  -KH         Timed Charges    43428 - PT Therapeutic Exercise Minutes  12  -KH         Untimed Charges    PT Eval/Re-eval Minutes  8  -KH         Total Minutes    Timed Charges Total Minutes  12  -KH      Untimed Charges Total Minutes  8  -KH       Total Minutes  20  -KH        User Key  (r) = Recorded By, (t) = Taken By, (c) = Cosigned By    Initials Name Provider Type     Dejah Alcantar PT Physical Therapist        Therapy Charges for Today     Code Description Service Date Service Provider Modifiers Qty    11266447929 HC PT THER PROC EA 15 MIN 6/14/2021 Dejah Alcantar, PT GP 1    36969775276 HC PT EVAL MOD COMPLEXITY 2 6/14/2021 Dejah Alcantar, PT GP 1          PT G-Codes  Outcome Measure Options: AM-PAC 6 Clicks Basic Mobility (PT)  AM-PAC 6 Clicks Score  (PT): 13    Dejah Alcantar, PT  6/14/2021

## 2021-06-14 NOTE — PLAN OF CARE
Goal Outcome Evaluation:  Plan of Care Reviewed With: patient           Outcome Summary: Pt is s/p R TKA and presents with limited ROM , impaired balance and  antalgic gait. Pt plans to d/c home and has 0 steps to enter. Pt was unsteady when standing and pivoting to the chair with mod A. He completed ROM with assist and heavy cueing. Pt was veryletharigc and had difficulty staying awake. PT  will follow for ROM, gait and stair training.

## 2021-06-15 VITALS
WEIGHT: 206.1 LBS | TEMPERATURE: 96.8 F | BODY MASS INDEX: 28.85 KG/M2 | SYSTOLIC BLOOD PRESSURE: 124 MMHG | DIASTOLIC BLOOD PRESSURE: 64 MMHG | OXYGEN SATURATION: 98 % | RESPIRATION RATE: 16 BRPM | HEART RATE: 59 BPM | HEIGHT: 71 IN

## 2021-06-15 LAB
ALBUMIN SERPL-MCNC: 4 G/DL (ref 3.5–5.2)
ALBUMIN/GLOB SERPL: 1.7 G/DL
ALP SERPL-CCNC: 48 U/L (ref 39–117)
ALT SERPL W P-5'-P-CCNC: 18 U/L (ref 1–41)
ANION GAP SERPL CALCULATED.3IONS-SCNC: 13.4 MMOL/L (ref 5–15)
AST SERPL-CCNC: 13 U/L (ref 1–40)
BASOPHILS # BLD AUTO: 0.02 10*3/MM3 (ref 0–0.2)
BASOPHILS NFR BLD AUTO: 0.1 % (ref 0–1.5)
BILIRUB SERPL-MCNC: 1 MG/DL (ref 0–1.2)
BUN SERPL-MCNC: 21 MG/DL (ref 8–23)
BUN/CREAT SERPL: 18.3 (ref 7–25)
CALCIUM SPEC-SCNC: 8.6 MG/DL (ref 8.6–10.5)
CHLORIDE SERPL-SCNC: 97 MMOL/L (ref 98–107)
CO2 SERPL-SCNC: 26.6 MMOL/L (ref 22–29)
CREAT SERPL-MCNC: 1.15 MG/DL (ref 0.76–1.27)
DEPRECATED RDW RBC AUTO: 42.3 FL (ref 37–54)
EOSINOPHIL # BLD AUTO: 0 10*3/MM3 (ref 0–0.4)
EOSINOPHIL NFR BLD AUTO: 0 % (ref 0.3–6.2)
ERYTHROCYTE [DISTWIDTH] IN BLOOD BY AUTOMATED COUNT: 13 % (ref 12.3–15.4)
GFR SERPL CREATININE-BSD FRML MDRD: 62 ML/MIN/1.73
GLOBULIN UR ELPH-MCNC: 2.3 GM/DL
GLUCOSE BLDC GLUCOMTR-MCNC: 167 MG/DL (ref 70–130)
GLUCOSE BLDC GLUCOMTR-MCNC: 209 MG/DL (ref 70–130)
GLUCOSE BLDC GLUCOMTR-MCNC: 250 MG/DL (ref 70–130)
GLUCOSE BLDC GLUCOMTR-MCNC: 260 MG/DL (ref 70–130)
GLUCOSE SERPL-MCNC: 240 MG/DL (ref 65–99)
HBA1C MFR BLD: 7.1 % (ref 4.8–5.6)
HCT VFR BLD AUTO: 40.1 % (ref 37.5–51)
HGB BLD-MCNC: 13.3 G/DL (ref 13–17.7)
IMM GRANULOCYTES # BLD AUTO: 0.05 10*3/MM3 (ref 0–0.05)
IMM GRANULOCYTES NFR BLD AUTO: 0.4 % (ref 0–0.5)
LYMPHOCYTES # BLD AUTO: 1.09 10*3/MM3 (ref 0.7–3.1)
LYMPHOCYTES NFR BLD AUTO: 8.2 % (ref 19.6–45.3)
MAGNESIUM SERPL-MCNC: 1.9 MG/DL (ref 1.6–2.4)
MCH RBC QN AUTO: 29.4 PG (ref 26.6–33)
MCHC RBC AUTO-ENTMCNC: 33.2 G/DL (ref 31.5–35.7)
MCV RBC AUTO: 88.7 FL (ref 79–97)
MONOCYTES # BLD AUTO: 0.78 10*3/MM3 (ref 0.1–0.9)
MONOCYTES NFR BLD AUTO: 5.8 % (ref 5–12)
NEUTROPHILS NFR BLD AUTO: 11.42 10*3/MM3 (ref 1.7–7)
NEUTROPHILS NFR BLD AUTO: 85.5 % (ref 42.7–76)
NRBC BLD AUTO-RTO: 0 /100 WBC (ref 0–0.2)
PLATELET # BLD AUTO: 175 10*3/MM3 (ref 140–450)
PMV BLD AUTO: 12.6 FL (ref 6–12)
POTASSIUM SERPL-SCNC: 3.9 MMOL/L (ref 3.5–5.2)
PROT SERPL-MCNC: 6.3 G/DL (ref 6–8.5)
RBC # BLD AUTO: 4.52 10*6/MM3 (ref 4.14–5.8)
SODIUM SERPL-SCNC: 137 MMOL/L (ref 136–145)
WBC # BLD AUTO: 13.36 10*3/MM3 (ref 3.4–10.8)

## 2021-06-15 PROCEDURE — 63710000001 INSULIN LISPRO (HUMAN) PER 5 UNITS: Performed by: HOSPITALIST

## 2021-06-15 PROCEDURE — 97110 THERAPEUTIC EXERCISES: CPT

## 2021-06-15 PROCEDURE — 85025 COMPLETE CBC W/AUTO DIFF WBC: CPT | Performed by: HOSPITALIST

## 2021-06-15 PROCEDURE — 82962 GLUCOSE BLOOD TEST: CPT

## 2021-06-15 PROCEDURE — G0378 HOSPITAL OBSERVATION PER HR: HCPCS

## 2021-06-15 PROCEDURE — 25010000003 CEFAZOLIN IN DEXTROSE 2-4 GM/100ML-% SOLUTION: Performed by: NURSE PRACTITIONER

## 2021-06-15 PROCEDURE — 83036 HEMOGLOBIN GLYCOSYLATED A1C: CPT | Performed by: NURSE PRACTITIONER

## 2021-06-15 PROCEDURE — 83735 ASSAY OF MAGNESIUM: CPT | Performed by: HOSPITALIST

## 2021-06-15 PROCEDURE — 97530 THERAPEUTIC ACTIVITIES: CPT

## 2021-06-15 PROCEDURE — 80053 COMPREHEN METABOLIC PANEL: CPT | Performed by: HOSPITALIST

## 2021-06-15 RX ORDER — ONDANSETRON 4 MG/1
4 TABLET, FILM COATED ORAL EVERY 8 HOURS PRN
Qty: 10 TABLET | Refills: 0 | Status: SHIPPED | OUTPATIENT
Start: 2021-06-15 | End: 2022-07-18

## 2021-06-15 RX ORDER — ASPIRIN 81 MG/1
TABLET ORAL
Qty: 56 TABLET | Refills: 0 | Status: SHIPPED | OUTPATIENT
Start: 2021-06-16 | End: 2021-07-28

## 2021-06-15 RX ORDER — PROMETHAZINE HYDROCHLORIDE 12.5 MG/1
12.5 TABLET ORAL EVERY 4 HOURS PRN
Status: CANCELLED | OUTPATIENT
Start: 2021-06-15

## 2021-06-15 RX ORDER — MELOXICAM 15 MG/1
15 TABLET ORAL DAILY
Qty: 14 TABLET | Refills: 0 | Status: SHIPPED | OUTPATIENT
Start: 2021-06-15 | End: 2021-06-29

## 2021-06-15 RX ORDER — PANTOPRAZOLE SODIUM 40 MG/1
40 TABLET, DELAYED RELEASE ORAL DAILY
Qty: 14 TABLET | Refills: 0 | Status: SHIPPED | OUTPATIENT
Start: 2021-06-15 | End: 2021-06-29

## 2021-06-15 RX ORDER — POLYETHYLENE GLYCOL 3350 17 G/17G
17 POWDER, FOR SOLUTION ORAL 2 TIMES DAILY
Qty: 238 G | Refills: 0 | Status: SHIPPED | OUTPATIENT
Start: 2021-06-15 | End: 2021-06-22

## 2021-06-15 RX ORDER — HYDROCODONE BITARTRATE AND ACETAMINOPHEN 7.5; 325 MG/1; MG/1
1-2 TABLET ORAL EVERY 4 HOURS
Qty: 60 TABLET | Refills: 0 | Status: SHIPPED | OUTPATIENT
Start: 2021-06-15 | End: 2021-06-29 | Stop reason: SDUPTHER

## 2021-06-15 RX ADMIN — HYDROCODONE BITARTRATE AND ACETAMINOPHEN 1 TABLET: 7.5; 325 TABLET ORAL at 02:20

## 2021-06-15 RX ADMIN — MELOXICAM 15 MG: 15 TABLET ORAL at 07:32

## 2021-06-15 RX ADMIN — CEFAZOLIN SODIUM 2 G: 2 INJECTION, SOLUTION INTRAVENOUS at 02:25

## 2021-06-15 RX ADMIN — HYDROCODONE BITARTRATE AND ACETAMINOPHEN 2 TABLET: 7.5; 325 TABLET ORAL at 10:26

## 2021-06-15 RX ADMIN — MUPIROCIN 1 APPLICATION: 20 OINTMENT TOPICAL at 07:33

## 2021-06-15 RX ADMIN — AMLODIPINE BESYLATE 10 MG: 10 TABLET ORAL at 07:32

## 2021-06-15 RX ADMIN — INSULIN LISPRO 8 UNITS: 100 INJECTION, SOLUTION INTRAVENOUS; SUBCUTANEOUS at 07:31

## 2021-06-15 RX ADMIN — METOPROLOL TARTRATE 50 MG: 50 TABLET, FILM COATED ORAL at 07:32

## 2021-06-15 RX ADMIN — INSULIN LISPRO 8 UNITS: 100 INJECTION, SOLUTION INTRAVENOUS; SUBCUTANEOUS at 02:25

## 2021-06-15 RX ADMIN — HYDROCODONE BITARTRATE AND ACETAMINOPHEN 1 TABLET: 7.5; 325 TABLET ORAL at 06:32

## 2021-06-15 RX ADMIN — ASPIRIN 81 MG: 81 TABLET, COATED ORAL at 07:32

## 2021-06-15 RX ADMIN — PANTOPRAZOLE SODIUM 40 MG: 40 TABLET, DELAYED RELEASE ORAL at 06:32

## 2021-06-15 RX ADMIN — LOSARTAN POTASSIUM: 50 TABLET, FILM COATED ORAL at 07:32

## 2021-06-15 RX ADMIN — POLYETHYLENE GLYCOL 3350 17 G: 17 POWDER, FOR SOLUTION ORAL at 07:33

## 2021-06-15 NOTE — CASE MANAGEMENT/SOCIAL WORK
Case Management Discharge Note      Final Note: Home with family/friend support & [facility] .         Selected Continued Care - Discharged on 6/15/2021 Admission date: 6/14/2021 - Discharge disposition: Home-Health Care Svc    Destination    No services have been selected for the patient.              Durable Medical Equipment    No services have been selected for the patient.              Dialysis/Infusion    No services have been selected for the patient.              Home Medical Care Coordination complete    Service Provider Selected Services Address Phone Fax Patient Preferred    [facility]  Home Health Services [address] [place] [address] [phone] --          Therapy    No services have been selected for the patient.              Community Resources    No services have been selected for the patient.              Community & DME    No services have been selected for the patient.                       Final Discharge Disposition Code: 01 - home or self-care

## 2021-06-15 NOTE — CASE MANAGEMENT/SOCIAL WORK
Discharge Planning Assessment  [facility]     Patient Name: Robin Karimi  MRN: [id number]  Today's Date: 6/15/2021    Admit Date: 6/14/2021    Discharge Needs Assessment     Row Name 06/15/21 0803       Living Environment    Lives With  spouse    Current Living Arrangements  home/apartment/condo    Primary Care Provided by  self    Provides Primary Care For  no one    Family Caregiver if Needed  spouse    Quality of Family Relationships  helpful;involved    Able to Return to Prior Arrangements  yes       Resource/Environmental Concerns    Resource/Environmental Concerns  none       Transition Planning    Patient/Family Anticipates Transition to  home with family    Patient/Family Anticipated Services at Transition  home health care       Discharge Needs Assessment    Readmission Within the Last 30 Days  no previous admission in last 30 days    Current Outpatient/Agency/Support Group  homecare agency    Equipment Currently Used at Home  cpap;cane, straight    Concerns to be Addressed  discharge planning    Equipment Needed After Discharge  commode;walker, rolling    Patient's Choice of Community Agency(s)  [facility]    Discharge Coordination/Progress  home with [facility]        Discharge Plan     Row Name 06/15/21 0804       Plan    Plan  home with [facility]    Patient/Family in Agreement with Plan  yes    Plan Comments  Spoke with pt and spouse. Confirmed facesheet correct. Explained role of CCP. Pt reports he lives with wife. He is IADLs has a cane and CPAP at home. Will need walker and commode, PT will provide. Pt reports no HH or SNF in past. He would like to use [facility], referral to [facility]. Verified PCP and pharmacy. Pt’s wife can transport home. CCP to follow.        Continued Care and Services - Admitted Since 6/14/2021     Home Medical Care     Service Provider Request Status Selected Services Address Phone Fax Patient Preferred    [facility]  Pending - Request Sent N/A [phone]  [facility] [address] [phone] --                Demographic Summary     Row Name 06/15/21 0803       General Information    Admission Type  inpatient    Arrived From  home    Required Notices Provided  Important Message from Medicare    Reason for Consult  discharge planning    Preferred Language  English        Functional Status    No documentation.       Psychosocial    No documentation.       Abuse/Neglect    No documentation.       Legal    No documentation.       Substance Abuse    No documentation.       Patient Forms    No documentation.           CONSUELO Nguyen

## 2021-06-15 NOTE — THERAPY TREATMENT NOTE
Patient Name: Robin Karimi  : 1945    MRN: [id number]                              Today's Date: 6/15/2021       Admit Date: 2021    Visit Dx:     ICD-10-CM ICD-9-CM   1. S/P TKR (total knee replacement), right  Z96.651 V43.65   2. Primary osteoarthritis of right knee  M17.11 715.16     Patient Active Problem List   Diagnosis   • Complete tear of left rotator cuff   • Impingement syndrome, shoulder   • Primary localized osteoarthrosis of right lower leg   • Tear of medial meniscus of right knee, current   • Chondromalacia of right knee   • Tear of medial meniscus of right knee   • Chondromalacia, left knee   • Arthritis of both knees   • Primary osteoarthritis of right knee   • Primary osteoarthritis of left knee   • Sleep apnea- CPAP   • Hypertension   • Factor 5 Leiden mutation, heterozygous   • Diabetes mellitus type 2, controlled    • Left carpal tunnel syndrome   • Acute pain of both knees     Past Medical History:   Diagnosis Date   • Asthma    • Cancer (CMS/HCC)     MELANOMA REMOVED FROM NOSE AND LT SHOULDER   • Carpal tunnel syndrome     LT HAND   • Coronary artery disease    • Diabetes mellitus (CMS/HCC)     TYPE 2   • Diverticulosis of colon    • Factor 5 Leiden mutation, heterozygous (CMS/HCC)    • GERD (gastroesophageal reflux disease)    • H/O cornea transplant    • Hip pain, chronic, left    • History of melanoma excision     LT SHOULDER AND NOSE   • History of skin cancer    • History of stomach ulcers    • Hypertension    • IBS (irritable bowel syndrome)    • Infectious viral hepatitis     CHILDHOOD   • Joint pain    • Neuropathy     IN FEET AND HANDS   • Numbness or tingling     LEFT HAND   • PONV (postoperative nausea and vomiting)    • Right knee pain    • Ringing in ears    • Sleep apnea     CPAP   • Stiffness in joint      Past Surgical History:   Procedure Laterality Date   • CARDIAC CATHETERIZATION      X2   • CARPAL TUNNEL RELEASE Left 2021    Procedure: CARPAL TUNNEL  RELEASE;  Surgeon: Ren March MD;  Location:  [facility];  Service: Orthopedics;  Laterality: Left;   • CHOLECYSTECTOMY     • COLONOSCOPY     • CORNEAL TRANSPLANT      GARETT   • CORONARY ANGIOPLASTY  2015    and 2011   • EXCISION LESION Left     LT SHOULDER/  MELANOMA REMOVED   • KNEE ARTHROSCOPY Right 3/20/2018    Procedure: RIGHT KNEE ARTHROSCOPY, PARTIAL MEDIAL MENISECTOMY, ABRASSSION CHONDROPLASTY, AND LOOSE BODY REMOVAL;  Surgeon: Logan Roblero MD;  Location:  [facility];  Service: Orthopedics   • KNEE SURGERY Right 06/23/2015    SCOPE   • NASAL MASS EXCISION  2016    MELANOMA   • ROTATOR CUFF REPAIR Left 2016   • TOTAL KNEE ARTHROPLASTY Left 2/12/2019    Procedure: TOTAL KNEE ARTHROPLASTY;  Surgeon: Seth Floyd MD;  Location: [facility] MAIN OR;  Service: Orthopedics   • TOTAL KNEE ARTHROPLASTY Right 6/14/2021    Procedure: TOTAL KNEE ARTHROPLASTY;  Surgeon: Seth Floyd MD;  Location: [facility] MAIN OR;  Service: Orthopedics;  Laterality: Right;     General Information     Row Name 06/15/21 0913          Physical Therapy Time and Intention    Document Type  therapy note (daily note)  -PH     Mode of Treatment  physical therapy  -PH     Row Name 06/15/21 0913          Cognition    Orientation Status (Cognition)  oriented x 4  -PH     Row Name 06/15/21 0913          Safety Issues, Functional Mobility    Impairments Affecting Function (Mobility)  endurance/activity tolerance;pain;balance  -PH       User Key  (r) = Recorded By, (t) = Taken By, (c) = Cosigned By    Initials Name Provider Type    PH Sirena Sanchez, PTA Physical Therapy Assistant        Mobility     Row Name 06/15/21 0914          Bed Mobility    Comment (Bed Mobility)  UI w/ wife; approp set up  -PH     Row Name 06/15/21 0914          Sit-Stand Transfer    Sit-Stand [place] (Transfers)  supervision;contact guard  -PH     Assistive Device (Sit-Stand Transfers)  walker, front-wheeled  -PH     Row Name 06/15/21 0914           Gait/Stairs (Locomotion)    [place] (Gait)  contact guard;standby assist  -PH     Assistive Device (Gait)  walker, front-wheeled  -PH     Distance in Feet (Gait)  220  -PH     Deviations/Abnormal Patterns (Gait)  teo decreased;gait speed decreased;stride length decreased  -PH     Bilateral Gait Deviations  forward flexed posture;heel strike decreased  -PH     Comment (Gait/Stairs)  Pt denies having stairs to asc to get into home; vc for upright posture.No overt safety issues noted  -PH     Row Name 06/15/21 0914          Mobility    Extremity Weight-bearing Status  right lower extremity  -PH     Right Lower Extremity (Weight-bearing Status)  weight-bearing as tolerated (WBAT)  -PH       User Key  (r) = Recorded By, (t) = Taken By, (c) = Cosigned By    Initials Name Provider Type     Sirena Sanchez PTA Physical Therapy Assistant        Obj/Interventions     Row Name 06/15/21 0915          Range of Motion Comprehensive    Comment, General Range of Motion  R knee -5/90  -PH     Row Name 06/15/21 0915          Motor Skills    Therapeutic Exercise  other (see comments) R TKA protocol x 15 reps; vc for alignment of ankle/knee/hip  -PH       User Key  (r) = Recorded By, (t) = Taken By, (c) = Cosigned By    Initials Name Provider Type     Sirena Sanchez PTA Physical Therapy Assistant        Goals/Plan    No documentation.       Clinical Impression     Row Name 06/15/21 0916          Pain    Additional Documentation  Pain Scale: Numbers Pre/Post-Treatment (Group)  -PH     [facility] Name 06/15/21 0916          Pain Scale: Numbers Pre/Post-Treatment    Pretreatment Pain Rating  2/10  -PH     Posttreatment Pain Rating  6/10  -PH     Pain Location - Side  Right  -PH     Pain Location - Orientation  incisional  -PH     Pain Location  knee  -PH     Pain Intervention(s)  Cold pack;Repositioned;Ambulation/increased activity;Rest  -PH     Row Name 06/15/21 0916          Plan of Care Review    Plan  of Care Reviewed With  patient;spouse  -PH     Progress  improving  -PH     Outcome Summary  Pt showing prog w/ amb of 220' req SBA and use of fww. Pt denies having stairs to asc to enter home or otherwise. Pt safe to return home w/ assist of spouse and use of fww during amb.  -PH     Row Name 06/15/21 0916          Positioning and Restraints    Pre-Treatment Position  sitting in chair/recliner  -PH     Post Treatment Position  chair  -PH     In Chair  reclined;call light within reach;encouraged to call for assist;with family/caregiver;exit alarm on  -PH       User Key  (r) = Recorded By, (t) = Taken By, (c) = Cosigned By    Initials Name Provider Type     Sirena Sanchez PTA Physical Therapy Assistant        Outcome Measures     Row Name 06/15/21 0918          How much help from another person do you currently need...    Turning from your back to your side while in flat bed without using bedrails?  4  -PH     Moving from lying on back to sitting on the side of a flat bed without bedrails?  3  -PH     Moving to and from a bed to a chair (including a wheelchair)?  4  -PH     Standing up from a chair using your arms (e.g., wheelchair, bedside chair)?  4  -PH     Climbing 3-5 steps with a railing?  3  -PH     To walk in hospital room?  3  -PH     AM-PAC 6 Clicks Score (PT)  21  -PH     Row Name 06/15/21 0918          Functional Assessment    Outcome Measure Options  AM-PAC 6 Clicks Basic Mobility (PT)  -PH       User Key  (r) = Recorded By, (t) = Taken By, (c) = Cosigned By    Initials Name Provider Type     Sirena Sanchez PTA Physical Therapy Assistant        Physical Therapy Education                 Title: PT OT SLP Therapies (Done)     Topic: Physical Therapy (Done)     Point: Mobility training (Done)     Learning Progress Summary           Patient Acceptance, E, VU,DU by  at 6/15/2021 0918    Acceptance, E,D, NR by  at 6/14/2021 1647                   Point: Home exercise program (Done)      Learning Progress Summary           Patient Acceptance, E, VU,DU by  at 6/15/2021 0918    Acceptance, E,D, NR by  at 6/14/2021 1647                   Point: Body mechanics (Done)     Learning Progress Summary           Patient Acceptance, E, VU,DU by  at 6/15/2021 0918    Acceptance, E,D, NR by  at 6/14/2021 1647                   Point: Precautions (Done)     Learning Progress Summary           Patient Acceptance, E, VU,DU by  at 6/15/2021 0918    Acceptance, E,D, NR by  at 6/14/2021 1647                               User Key     Initials Effective Dates Name Provider Type Discipline     02/05/19 -  Dejah Alcantar, PT Physical Therapist PT     08/20/19 -  Sirena Sanchez PTA Physical Therapy Assistant PT              PT Recommendation and Plan     Plan of Care Reviewed With: patient, spouse  Progress: improving  Outcome Summary: Pt showing prog w/ amb of 220' req SBA and use of fww. Pt denies having stairs to asc to enter home or otherwise. Pt safe to return home w/ assist of spouse and use of fww during amb.     Time Calculation:   PT Charges     Row Name 06/15/21 0919             Time Calculation    Start Time  0835  -PH      Stop Time  0908  -PH      Time Calculation (min)  33 min  -PH      PT Received On  06/15/21  -PH      PT - Next Appointment  06/16/21  -PH         Timed Charges    39771 - PT Therapeutic Exercise Minutes  15  -PH      83352 - PT Therapeutic Activity Minutes  16  -PH         Total Minutes    Timed Charges Total Minutes  31  -PH       Total Minutes  31  -PH        User Key  (r) = Recorded By, (t) = Taken By, (c) = Cosigned By    Initials Name Provider Type     Sirena Sanchez PTA Physical Therapy Assistant        Therapy Charges for Today     Code Description Service Date Service Provider Modifiers Qty    87738662690 HC PT THER PROC EA 15 MIN 6/15/2021 Sirena Sanchez PTA GP 1    05096218481 HC PT THERAPEUTIC ACT EA 15 MIN 6/15/2021  Sirena Sanchez, PTA GP 1          PT G-Codes  Outcome Measure Options: AM-PAC 6 Clicks Basic Mobility (PT)  AM-PAC 6 Clicks Score (PT): 21    Sirena Sanchez, PTA  6/15/2021

## 2021-06-15 NOTE — OP NOTE
Name: Robin Karimi  YOB: 1945    DATE OF SURGERY: 6/14/21    PREOPERATIVE DIAGNOSIS: Right knee end-stage osteoarthritis    POSTOPERATIVE DIAGNOSIS: Right knee end-stage osteoarthritis    PROCEDURE PERFORMED: Right total knee replacement    SURGEON: Seth Floyd M.D.    ASSISTANT: CHARLI JARRETT    IMPLANTS: [employer]:     Implant Name Type Inv. Item Serial No.  Lot No. LRB No. Used Action   CMT BONE PALACOS R HI/VISC 1X40 - [id number] Implant CMT BONE PALACOS R HI/VISC 1X40  [facility] [id number] Right 1 Implanted   SUT CONTRL TISS STRATAFIX SPIRAL MNCRYL UD 3/0 PLS 30CM - [id number] Implant SUT CONTRL TISS STRATAFIX SPIRAL MNCRYL UD 3/0 PLS 30CM  [employer]  DIV OF J AND J [id number] Right 1 Implanted   SUT CONTRL TISS STRATAFIX SYMM PDS PLUS JEEVAN CT-1 60CM - [id number] Implant SUT CONTRL TISS STRATAFIX SYMM PDS PLUS JEEVAN CT-1 60CM  [employer]  DIV [employer] J AND J [id number] Right 1 Implanted   CMT BONE PALACOS R HI/VISC 1X40 - [id number] Implant CMT BONE PALACOS R HI/VISC 1X40  [facility] [id number] Right 1 Implanted   COMP FEM LEGION OXINIUM CR SZ5 RT - [id number] Implant COMP FEM LEGION OXINIUM CR SZ5 RT  [employer] [id number] Right 1 Implanted   BASE TIB/KN GEN2 NONPOR TI SZ5 RT - [id number] Implant BASE TIB/KN GEN2 NONPOR TI SZ5 RT  [employer] [id number] Right 1 Implanted   PAT GEN2 BICONVEX [id number] - [id number] Implant PAT GEN2 BICONVEX [id number]  [employer] [id number] Right 1 Implanted   INSRT ART LEGION CR HF XLPE SZ5TO6 9MM - [id number] Implant INSRT ART LEGION CR HF XLPE [id number] 9MM  [employer] [id number] Right 1 Implanted       Estimated Blood Loss: 200cc  Specimens : none  Complications: none    DESCRIPTION OF PROCEDURE: The patient was taken to the operating room and placed in the supine position. A sequential compression device was carefully placed on the non-operative leg. Preoperative antibiotics were administered. Surgical time out was  performed. After adequate induction of anesthesia, the leg was prepped and draped in the usual sterile fashion, exsanguinated with an Esmarch bandage and the tourniquet inflated to 250 mmHg. A midline incision was performed followed by a medial parapatellar arthrotomy. The patella was subluxed laterally.  A portion of the fat pad, ACL, and anterior horns of the meniscus were excised. The drill hole was placed in the distal femur and the canal was the irrigated and suctioned. The IM ligia was placed and a 5 degree distal valgus cut was performed on the femur. The femur was then sized with a sizing guide. The femoral cutting block was placed and all femoral cuts were performed. The proximal tibia was exposed. We used the extramedullary tibial cutting guide set for removal of 9mm of bone off the high side. The tibial cut was performed. The posterior horns of the menisci were excised. The posterior osteophytes were removed. Flexion extension blocks were then used to balance the knee. The tibial cut surface was then sized with the sizing templates and the tibial and femoral trial were then placed. The knee was placed in full extension and then the tibial tray rotation was then matched to the femoral rotation and marked.    Attention was then placed to the patella. The patella was noted to track centrally through range of motion. The patella was then sized with the trials. The thickness of the patella was then measured. The patella was resurfaced and the surrounding osteophytes were removed. The preoperative thickness was reproduced. The patella tracked centrally through range of motion.   At this point all trial components were removed, the knee was copiously irrigated with pulsed lavage, and the knee was injected with anesthetic cocktail solution. The cut surfaces were then dried with clean lap sponges, and the components were cemented tibia, followed by femur, then patella. The knee was held in full extension and all  excess cement was removed. The knee was held still until the cement had completely hardened. We then placed the trial polyethylene spacer which resulted in full extension and excellent flexion-extension balance. We placed the final polyethylene spacer.   The knee was then copiously irrigated. The tourniquet was then released. There was excellent hemostasis. We placed a one-eighth inch Hemovac drain. We closed the knee in multiple layers in standard fashion. Sterile dressing were applied. At the end of the case, the sponge and needle counts were reported as being correct. There were no known complications. The patient was then transported to the recovery room.      Seth Floyd M.D.

## 2021-06-15 NOTE — DISCHARGE PLACEMENT REQUEST
"Trev Robin LEXY (76 y.o. Male)     Date of Birth Social Security Number Address Home Phone MRN    1945  [address] [facility] [facility] [address] [phone] [id number]    Sikhism Marital Status          Oriental orthodox        Admission Date Admission Type Admitting Provider Attending Provider Department, Room/Bed    6/14/21 Elective Seth Floyd MD Brown, Reid B, MD [address], P779/1    Discharge Date Discharge Disposition Discharge Destination                       Attending Provider: Seth Floyd MD    Allergies: Benazepril-hydrochlorothiazide, Zocor [Simvastatin]    Isolation: None   Infection: None   Code Status: Prior    Ht: 180.3 cm (71\")   Wt: 93.5 kg (206 lb 1.6 oz)    Admission Cmt: None   Principal Problem: Primary osteoarthritis of right knee [M17.11] More...                 Active Insurance as of 6/14/2021     Primary Coverage     Payor Plan Insurance Group Employer/Plan Group    HUMANA MEDICARE REPLACEMENT HU[employer]NA MEDICARE REPLACEMENT [id number]     Payor Plan Address Payor Plan Phone Number Payor Plan Fax Number Effective Dates    [address] [address] [phone]  1/1/2018 - None Entered    [facility] [id number]       Subscriber Name Subscriber Birth Date Member ID       ROBIN PATTERSON 1945 [id number]                 Emergency Contacts      (Rel.) Home Phone Work Phone Mobile Phone    Charo Patterson (Spouse) -- -- [phone]            "

## 2021-06-15 NOTE — CONSULTS
[facility]   Consult Note    Patient Name: Robin Karimi  : 1945  MRN: [id number]  Primary Care Physician:  Yair Lyle MD  Referring Physician: Seth Floyd MD  Date of admission: 2021    Inpatient Internal Medicine Consult  Consult performed by: Ahsan Stafford MD  Consult ordered by: Kd Oliva MD  Reason for consult: Diabetes with hyperglycemia        Subjective   Subjective     Reason for Consult/ Chief Complaint: hyperglycemia    Very pleasant 75yo gentleman who underwent elective right TKA this AM by Dr. Floyd. We are asked to see for insulin-dependent Type 2 DM with some pretty significant hyperglycemia tonight. Pt is resting comfortably in chair this evening. Pain is well-controlled. He is voiding well and tolerating diet. He has received both of his OHGs (Metformin and Glipizide) this afternoon as well as his nighttime dose of long-acting insulin. In addition he received a one-time dose of 8 units short-acting Lispro. Last blood sugar 374. He has no symptoms of hyperglycemia at present.       Review of Systems   Constitutional: Negative for appetite change, chills, diaphoresis, fatigue and fever.   HENT: Negative for congestion, ear pain, nosebleeds, rhinorrhea, sore throat, trouble swallowing and voice change.    Eyes: Negative for pain and visual disturbance.   Respiratory: Negative for cough, choking, chest tightness, shortness of breath and stridor.    Cardiovascular: Negative for chest pain, palpitations and leg swelling.   Gastrointestinal: Negative for abdominal pain, blood in stool, diarrhea, nausea and vomiting.   Endocrine: Negative for cold intolerance and heat intolerance.   Genitourinary: Positive for difficulty urinating (earlier today, now resolved). Negative for decreased urine volume, dysuria and hematuria.   Musculoskeletal: Negative for back pain and neck pain.   Skin: Negative for color change, pallor and rash.   Allergic/Immunologic: Negative for  environmental allergies and food allergies.   Neurological: Negative for dizziness, tremors, seizures, syncope, facial asymmetry, speech difficulty, weakness, light-headedness, numbness and headaches.   Hematological: Negative for adenopathy. Does not bruise/bleed easily.   Psychiatric/Behavioral: Negative for behavioral problems, confusion and hallucinations.        Personal History     Past Medical History:   Diagnosis Date   • Asthma    • Cancer (CMS/HCC)     MELANOMA REMOVED FROM NOSE AND LT SHOULDER   • Carpal tunnel syndrome     LT HAND   • Coronary artery disease    • Diabetes mellitus (CMS/HCC)     TYPE 2   • Diverticulosis of colon    • Factor 5 Leiden mutation, heterozygous (CMS/HCC)    • GERD (gastroesophageal reflux disease)    • H/O cornea transplant    • Hip pain, chronic, left    • History of melanoma excision     LT SHOULDER AND NOSE   • History of skin cancer    • History of stomach ulcers    • Hypertension    • IBS (irritable bowel syndrome)    • Infectious viral hepatitis     CHILDHOOD   • Joint pain    • Neuropathy     IN FEET AND HANDS   • Numbness or tingling     LEFT HAND   • PONV (postoperative nausea and vomiting)    • Right knee pain    • Ringing in ears    • Sleep apnea     CPAP   • Stiffness in joint        Past Surgical History:   Procedure Laterality Date   • CARDIAC CATHETERIZATION      X2   • CARPAL TUNNEL RELEASE Left 4/20/2021    Procedure: CARPAL TUNNEL RELEASE;  Surgeon: Ren March MD;  Location: [facility];  Service: Orthopedics;  Laterality: Left;   • CHOLECYSTECTOMY     • COLONOSCOPY     • CORNEAL TRANSPLANT      GARETT   • CORONARY ANGIOPLASTY  2015    and 2011   • EXCISION LESION Left     LT SHOULDER/  MELANOMA REMOVED   • KNEE ARTHROSCOPY Right 3/20/2018    Procedure: RIGHT KNEE ARTHROSCOPY, PARTIAL MEDIAL MENISECTOMY, ABRASSSION CHONDROPLASTY, AND LOOSE BODY REMOVAL;  Surgeon: Logan Roblero MD;  Location: [facility];  Service: Orthopedics   • KNEE  SURGERY Right 06/23/2015    SCOPE   • NASAL MASS EXCISION  2016    MELANOMA   • ROTATOR CUFF REPAIR Left 2016   • TOTAL KNEE ARTHROPLASTY Left 2/12/2019    Procedure: TOTAL KNEE ARTHROPLASTY;  Surgeon: Seth Floyd MD;  Location: [facility];  Service: Orthopedics       Family History: family history includes Diabetes in an other family member; Heart disease in an other family member; Hypertension in an other family member; Stomach cancer in an other family member. Otherwise pertinent FHx was reviewed and not pertinent to current issue.    Social History:  reports that he has never smoked. He has never used smokeless tobacco. He reports that he does not drink alcohol and does not use drugs.    Home Medications:   Insulin Detemir, Insulin Pen Needle, Probiotic Acidophilus, amLODIPine, aspirin, cetirizine, cholecalciferol, docusate sodium, glipizide, hydroCHLOROthiazide, losartan-hydrochlorothiazide, loteprednol, metFORMIN, metoprolol tartrate, nitroglycerin, pantoprazole, rosuvastatin, and vitamin B-12    Allergies:  Allergies   Allergen Reactions   • Benazepril-Hydrochlorothiazide Cough   • Zocor [Simvastatin] Other (See Comments)     MUSCLE PAIN       Objective    Objective     Vitals:  Temp:  [97.1 °F (36.2 °C)-98 °F (36.7 °C)] 97.3 °F (36.3 °C)  Heart Rate:  [51-72] 72  Resp:  [14-18] 16  BP: (116-150)/(53-84) 141/61  Flow (L/min):  [2-5] 2    Physical Exam  Vitals and nursing note reviewed.   Constitutional:       General: He is not in acute distress.     Appearance: He is not ill-appearing, toxic-appearing or diaphoretic.   HENT:      Head: Normocephalic and atraumatic.      Right Ear: External ear normal.      Left Ear: External ear normal.      Nose: Nose normal.      Mouth/Throat:      Mouth: Mucous membranes are moist.      Pharynx: Oropharynx is clear.   Eyes:      General: No scleral icterus.        Right eye: No discharge.         Left eye: No discharge.      Extraocular Movements: Extraocular  movements intact.      Conjunctiva/sclera: Conjunctivae normal.      Pupils: Pupils are equal, round, and reactive to light.   Cardiovascular:      Rate and Rhythm: Normal rate and regular rhythm.      Pulses: Normal pulses.   Pulmonary:      Effort: Pulmonary effort is normal. No respiratory distress.      Breath sounds: Normal breath sounds.   Abdominal:      General: Bowel sounds are normal. There is no distension.      Tenderness: There is no abdominal tenderness.   Musculoskeletal:      Cervical back: Neck supple. No rigidity.      Comments: NVI in distal BLEs  Postop dressing in place RLE   Lymphadenopathy:      Cervical: No cervical adenopathy.   Skin:     General: Skin is warm and dry.      Capillary Refill: Capillary refill takes less than 2 seconds.      Coloration: Skin is not jaundiced.      Findings: No rash.   Neurological:      General: No focal deficit present.      Mental Status: He is alert and oriented to person, place, and time. Mental status is at baseline.      Cranial Nerves: No cranial nerve deficit.      Sensory: No sensory deficit.      Coordination: Coordination normal.   Psychiatric:         Mood and Affect: Mood normal.         Behavior: Behavior normal.         Thought Content: Thought content normal.         Result Review    Result Review:  I have personally reviewed the results from the time of this admission to 6/14/2021 22:52 EDT and agree with these findings:  [x]  Laboratory  []  Microbiology  [x]  Radiology  []  EKG/Telemetry   []  Cardiology/Vascular   []  Pathology  [x]  Old records  []  Other:  Most notable findings include: AccuCheck 374    Assessment/Plan   Assessment / Plan     Brief Patient Summary:  Robin Karimi is a 76 y.o. male who we are asked to see for postop hyperglycemia in Type 2 DM.    Active Hospital Problems:  Active Hospital Problems    Diagnosis    • **Primary osteoarthritis of right knee      Added automatically from request for surgery [id number]     •  Hypertension    • Factor 5 Leiden mutation, heterozygous    • Diabetes mellitus type 2, controlled     • Sleep apnea- CPAP      CPAP       Plan:   Review of chart reveals pt received 8mg IV Decadron this AM  Hyperglycemia not surprising  Continue current regimen but will increase SSI dose and have asked RN to check BG at 0200 and treat with SSI  A1c ordered for AM  This degree of hyperglycemia should not preclude his discharge tomorrow, he is well-versed in the treatment of diabetes at home and should be less of an issue if no more steroids are given  D/w pt and RN    Thank you for the opportunity to participate in the care of this very nice gentleman, we will continue to follow while he is admitted    Electronically signed by Ahsan Stafford MD, 06/14/21, 10:52 PM EDT.

## 2021-06-15 NOTE — DISCHARGE SUMMARY
Patient Name: Robin Karimi  Patient YOB: 1945    Date of Admission:  6/14/2021  Date of Discharge:  6/15/2021  Discharge Diagnosis: ME TOTAL KNEE ARTHROPLASTY [24814] (TOTAL KNEE ARTHROPLASTY)  Presenting Problem/History of Present Illness: Primary osteoarthritis of right knee [M17.11]  OA (osteoarthritis) of knee [M17.10]  Admitting Physician: Dr Seth Floyd  Consults:   Consults     Date and Time Order Name Status Description    6/14/2021  9:18 PM Inpatient Internal Medicine Consult Completed           DETAILS OF HOSPITAL STAY:  Patient is a 76 y.o. male was admitted to the floor following the above procedure and underwent an uncomplicated hospital stay.  Patient did well with physical therapy and was ambulating well without problems.  On the day of discharge the wound was clean, dry and intact and calf was soft and non tender and Homans sign was negative.  Patient was tolerating   Diet Instructions     Advance Diet as Tolerated      May advance diet as tolerated while in hospital.    Diet:      Diet Texture / Consistency: Regular       without problems.  Patient will be discharged home.    Condition on Discharge:  Stable    Vital Signs  Temp:  [96.8 °F (36 °C)-97.8 °F (36.6 °C)] 96.8 °F (36 °C)  Heart Rate:  [51-72] 59  Resp:  [14-16] 16  BP: (111-150)/(53-84) 124/64    LABS:      Admission on 06/14/2021   Component Date Value Ref Range Status   • Glucose 06/14/2021 154* 70 - 130 mg/dL Final   • Glucose 06/14/2021 180* 70 - 130 mg/dL Final   • Glucose 06/14/2021 374* 70 - 130 mg/dL Final   • Glucose 06/15/2021 240* 65 - 99 mg/dL Final   • BUN 06/15/2021 21  8 - 23 mg/dL Final   • Creatinine 06/15/2021 1.15  0.76 - 1.27 mg/dL Final   • Sodium 06/15/2021 137  136 - 145 mmol/L Final   • Potassium 06/15/2021 3.9  3.5 - 5.2 mmol/L Final   • Chloride 06/15/2021 97* 98 - 107 mmol/L Final   • CO2 06/15/2021 26.6  22.0 - 29.0 mmol/L Final   • Calcium 06/15/2021 8.6  8.6 - 10.5 mg/dL Final   • Total Protein  06/15/2021 6.3  6.0 - 8.5 g/dL Final   • Albumin 06/15/2021 4.00  3.50 - 5.20 g/dL Final   • ALT (SGPT) 06/15/2021 18  1 - 41 U/L Final   • AST (SGOT) 06/15/2021 13  1 - 40 U/L Final   • Alkaline Phosphatase 06/15/2021 48  39 - 117 U/L Final   • Total Bilirubin 06/15/2021 1.0  0.0 - 1.2 mg/dL Final   • eGFR Non  Amer 06/15/2021 62  >60 mL/min/1.73 Final   • Globulin 06/15/2021 2.3  gm/dL Final   • A/G Ratio 06/15/2021 1.7  g/dL Final   • BUN/Creatinine Ratio 06/15/2021 18.3  7.0 - 25.0 Final   • Anion Gap 06/15/2021 13.4  5.0 - 15.0 mmol/L Final   • Magnesium 06/15/2021 1.9  1.6 - 2.4 mg/dL Final   • WBC 06/15/2021 13.36* 3.40 - 10.80 10*3/mm3 Final   • RBC 06/15/2021 4.52  4.14 - 5.80 10*6/mm3 Final   • Hemoglobin 06/15/2021 13.3  13.0 - 17.7 g/dL Final   • Hematocrit 06/15/2021 40.1  37.5 - 51.0 % Final   • MCV 06/15/2021 88.7  79.0 - 97.0 fL Final   • MCH 06/15/2021 29.4  26.6 - 33.0 pg Final   • MCHC 06/15/2021 33.2  31.5 - 35.7 g/dL Final   • RDW 06/15/2021 13.0  12.3 - 15.4 % Final   • RDW-SD 06/15/2021 42.3  37.0 - 54.0 fl Final   • MPV 06/15/2021 12.6* 6.0 - 12.0 fL Final   • Platelets 06/15/2021 175  140 - 450 10*3/mm3 Final   • Neutrophil % 06/15/2021 85.5* 42.7 - 76.0 % Final   • Lymphocyte % 06/15/2021 8.2* 19.6 - 45.3 % Final   • Monocyte % 06/15/2021 5.8  5.0 - 12.0 % Final   • Eosinophil % 06/15/2021 0.0* 0.3 - 6.2 % Final   • Basophil % 06/15/2021 0.1  0.0 - 1.5 % Final   • Immature Grans % 06/15/2021 0.4  0.0 - 0.5 % Final   • Neutrophils, Absolute 06/15/2021 11.42* 1.70 - 7.00 10*3/mm3 Final   • Lymphocytes, Absolute 06/15/2021 1.09  0.70 - 3.10 10*3/mm3 Final   • Monocytes, Absolute 06/15/2021 0.78  0.10 - 0.90 10*3/mm3 Final   • Eosinophils, Absolute 06/15/2021 0.00  0.00 - 0.40 10*3/mm3 Final   • Basophils, Absolute 06/15/2021 0.02  0.00 - 0.20 10*3/mm3 Final   • Immature Grans, Absolute 06/15/2021 0.05  0.00 - 0.05 10*3/mm3 Final   • nRBC 06/15/2021 0.0  0.0 - 0.2 /100 WBC Final   •  Glucose 06/15/2021 260* 70 - 130 mg/dL Final   • Glucose 06/15/2021 250* 70 - 130 mg/dL Final       No results found.    Discharge Medications     Discharge Medications      New Medications      Instructions Start Date   HYDROcodone-acetaminophen 7.5-325 MG per tablet  Commonly known as: NORCO   1-2 po q 4-6 hr prn pain      meloxicam 15 MG tablet  Commonly known as: MOBIC   15 mg, Oral, Daily, Dispense 14 days supply      ondansetron 4 MG tablet  Commonly known as: Zofran   4 mg, Oral, Every 8 Hours PRN      polyethylene glycol 17 GM/SCOOP powder  Commonly known as: MIRALAX   17 g, Oral, 2 Times Daily, Dispense 7 day supply         Changes to Medications      Instructions Start Date   aspirin 81 MG EC tablet  What changed:   · how much to take  · how to take this  · when to take this  · additional instructions   Take 1 tab po bid x 14 days; then take 1 tab po daily x 28 days      pantoprazole 40 MG EC tablet  Commonly known as: PROTONIX  What changed: Another medication with the same name was added. Make sure you understand how and when to take each.   40 mg, Oral, Every Morning      pantoprazole 40 MG EC tablet  Commonly known as: PROTONIX  What changed: You were already taking a medication with the same name, and this prescription was added. Make sure you understand how and when to take each.   40 mg, Oral, Daily      Stool Softener Laxative 100 MG capsule  Generic drug: docusate sodium  What changed:   · when to take this  · reasons to take this   100 mg, Oral, 2 Times Daily         Continue These Medications      Instructions Start Date   amLODIPine 10 MG tablet  Commonly known as: NORVASC   10 mg, Oral, Daily      cetirizine 10 MG tablet  Commonly known as: zyrTEC   10 mg, Oral, Daily PRN      glipizide 10 MG tablet  Commonly known as: GLUCOTROL   10 mg, Oral, 2 Times Daily Before Meals      hydroCHLOROthiazide 25 MG tablet  Commonly known as: HYDRODIURIL   12.5 mg, Oral, Daily      LEVEMIR FLEXPEN SC   50  Units, Subcutaneous, Every Evening      losartan-hydrochlorothiazide 50-12.5 MG per tablet  Commonly known as: HYZAAR   1 tablet, Oral, Every Morning      loteprednol 0.5 % ophthalmic suspension  Commonly known as: LOTEMAX   1 drop, Both Eyes, Every Morning      metFORMIN 500 MG tablet  Commonly known as: GLUCOPHAGE   500 mg, Oral, 2 Times Daily With Meals      metoprolol tartrate 50 MG tablet  Commonly known as: LOPRESSOR   50 mg, Oral, 2 Times Daily      nitroglycerin 0.4 MG SL tablet  Commonly known as: NITROSTAT   0.4 mg, Sublingual, Every 5 Minutes PRN, Take no more than 3 doses in 15 minutes.       Probiotic Acidophilus capsule   1 tablet/day, Oral, Take As Directed, TAKES 4 X A WEEK      RELION PEN NEEDLE 31G/8MM 31G X 8 MM misc  Generic drug: Insulin Pen Needle   USE 1 ONCE DAILY WITH INSULIN INJECTION      rosuvastatin 20 MG tablet  Commonly known as: CRESTOR   10 mg, Oral, Nightly         Stop These Medications    cholecalciferol 25 MCG (1000 UT) tablet  Commonly known as: VITAMIN D3     vitamin B-12 500 MCG tablet  Commonly known as: CYANOCOBALAMIN            Discharge Instructions: Patient is to continue with physical therapy exercises daily and continue working with the physical therapist as ordered. Patient may weight bear as tolerated. Apply ice regularly. Patient may ice for long periods of time as long as ice is not directly on the skin. Patient instructed on frequent calf pumping exercises.  Patient also instructed on incentive spirometer during hospitalization and encouraged to continue to use at home regularly.    Dressing: The dressing is designed to be left in place until you return to the office in 2 weeks.  The suction unit should stop functioning at 7 days and the green light will switch to yellow.  At that point the suction unit and tubing can be disconnected at the port closest to the dressing.  The suction unit and tubing may be discarded.  You may shower immediately upon return home, you  will need to turn the pump off by depressing the orange button once and then you may disconnect the pump and tubing at the connection port.  After showering, shake off the excess water and reattach the tubing.  Restart the pump by depressing the orange button one time and you will notice the green light flashing again.  If the dressing becomes disloged or saturated it should be changed. Please refer to the KRYSTINA information sheet if you have any questions about the dressing.  If you have a home health nurse or therapist they can be contacted to assist with dressing change or repair. You may also call the KRYSTINA dressing hotline for questions related to the dressing (1-610.558.1468). If there still other problems or questions related to the dressing despite these measures then you can contact [facility] at our office [phone].  If for some reason the KRYSTINA dressing is removed, after 7 days the wound can be gently cleaned with antibacterial soap then allowed to dry and covered with a dry sterile dressing. The wound should be covered at all times except while showering.  Patient may change dressings daily and prn using sterile 4x4 and paper tape, and should call if any unusual drainage, redness or swelling.*  Follow up appointment in 2 weeks - patient to call the office at [phone] to schedule.  Patient will be discharged on aspirin 81mg BID x weeks, then daily x 4weeks    Discharge Diagnosis:    Patient Active Problem List   Diagnosis   • Complete tear of left rotator cuff   • Impingement syndrome, shoulder   • Primary localized osteoarthrosis of right lower leg   • Tear of medial meniscus of right knee, current   • Chondromalacia of right knee   • Tear of medial meniscus of right knee   • Chondromalacia, left knee   • Arthritis of both knees   • Primary osteoarthritis of right knee   • Primary osteoarthritis of left knee   • Sleep apnea- CPAP   • Hypertension   • Factor 5 Leiden mutation, heterozygous   • Diabetes mellitus  type 2, controlled    • Left carpal tunnel syndrome   • Acute pain of both knees       Follow-up Appointments  Future Appointments   Date Time Provider Department Center   6/29/2021  1:10 PM Seth Floyd MD [facility] [address] [facility]   10/21/2021  9:00 AM Ahsan Dewitt MD [facility]     (Patient will be d/c home once blood sugar level is less than 200)     BUFFY Kwon  06/15/21  08:22 EDT

## 2021-06-15 NOTE — PLAN OF CARE
Goal Outcome Evaluation:  Plan of Care Reviewed With: patient, spouse        Progress: improving  Outcome Summary: Pt showing prog w/ amb of 220' req SBA and use of fww. Pt denies having stairs to asc to enter home or otherwise. Pt safe to return home w/ assist of spouse and use of fww during amb.    Patient was intermittently wearing a face mask during this therapy encounter. Therapist used appropriate personal protective equipment including eye protection, mask, and gloves.  Mask used was standard procedure mask. Appropriate PPE was worn during the entire therapy session. Hand hygiene was completed before and after therapy session. Patient is not in enhanced droplet precautions.

## 2021-06-29 ENCOUNTER — OFFICE VISIT (OUTPATIENT)
Dept: ORTHOPEDIC SURGERY | Facility: CLINIC | Age: 76
End: 2021-06-29

## 2021-06-29 VITALS — TEMPERATURE: 97.7 F

## 2021-06-29 DIAGNOSIS — Z96.651 S/P TKR (TOTAL KNEE REPLACEMENT), RIGHT: ICD-10-CM

## 2021-06-29 PROCEDURE — 99024 POSTOP FOLLOW-UP VISIT: CPT | Performed by: ORTHOPAEDIC SURGERY

## 2021-06-29 RX ORDER — HYDROCODONE BITARTRATE AND ACETAMINOPHEN 7.5; 325 MG/1; MG/1
1-2 TABLET ORAL EVERY 4 HOURS
Qty: 60 TABLET | Refills: 0 | Status: SHIPPED | OUTPATIENT
Start: 2021-06-29 | End: 2021-08-12 | Stop reason: SDUPTHER

## 2021-06-29 NOTE — PROGRESS NOTES
Robin Karimi : 1945 MRN: [id number] DATE: 2021    DIAGNOSIS: 2 week follow up right total knee      SUBJECTIVE:Patient returns today for 2 week follow up of right total knee replacement. Patient reports doing well with no unusual complaints. Appears to be progressing appropriately.    OBJECTIVE:   Exam:. The incision is healing appropriately. No sign of infection. Range of motion is progressing as expected. The calf is soft and nontender with a negative Homans sign.    ASSESSMENT: 2 week status post right knee replacement.    PLAN: 1) Staples removed and steri strips applied   2) Order given for PT   3) Discontinue JEROME hose   4) Continue ice PRN   5) aspirin 81 mg orally every day twice daily for 6 weeks   6) Follow up in 6 weeks with repeat Xrays of right knee (3views)    Seth Floyd MD  2021

## 2021-07-08 ENCOUNTER — TELEPHONE (OUTPATIENT)
Dept: ORTHOPEDIC SURGERY | Facility: CLINIC | Age: 76
End: 2021-07-08

## 2021-07-08 NOTE — TELEPHONE ENCOUNTER
Patient's wife is calling regarding the patient's right knee that he had surgery on 6-14-21. It is red and warm to the touch. She says it's been red since the bandages were removed and has become warm to the touch over the past few days. PT told them he needed to contact our office as it wasn't getting any better. Please advise.

## 2021-07-08 NOTE — TELEPHONE ENCOUNTER
Called and spoke with patient pt confirmed it is red, warm to the touch, swollen to the right of the incision and pain when he presses down on the swollen area (which I told him not to do anymore). No drainage.     Please advise.

## 2021-07-09 ENCOUNTER — OFFICE VISIT (OUTPATIENT)
Dept: ORTHOPEDIC SURGERY | Facility: CLINIC | Age: 76
End: 2021-07-09

## 2021-07-09 VITALS — WEIGHT: 200 LBS | HEIGHT: 71 IN | TEMPERATURE: 97.7 F | BODY MASS INDEX: 28 KG/M2

## 2021-07-09 DIAGNOSIS — R52 PAIN: Primary | ICD-10-CM

## 2021-07-09 PROCEDURE — 99024 POSTOP FOLLOW-UP VISIT: CPT | Performed by: NURSE PRACTITIONER

## 2021-07-09 PROCEDURE — 73562 X-RAY EXAM OF KNEE 3: CPT | Performed by: NURSE PRACTITIONER

## 2021-07-09 RX ORDER — CEPHALEXIN 500 MG/1
CAPSULE ORAL
Qty: 4 CAPSULE | Refills: 5 | Status: SHIPPED | OUTPATIENT
Start: 2021-07-09 | End: 2021-07-09 | Stop reason: ALTCHOICE

## 2021-07-09 RX ORDER — CEPHALEXIN 500 MG/1
500 CAPSULE ORAL 4 TIMES DAILY
Qty: 20 CAPSULE | Refills: 0 | Status: SHIPPED | OUTPATIENT
Start: 2021-07-09 | End: 2022-06-14

## 2021-07-09 NOTE — PROGRESS NOTES
Robin Karimi : 1945 MRN: [id number] DATE: 2021    DIAGNOSIS: general follow up right total knee      SUBJECTIVE:Patient returns today for a follow up of right total knee replacement. Patient reports he has noticed some redness around his incision and it felt warm to the touch.  Patient states physical therapist saw him yesterday and wanted him to be evaluated.    OBJECTIVE:   Exam:. The incision is well healed. No sign of infection.  Patient does have a mild area of erythema noted to the medial aspect of his knee.  Range of motion is measured at 3 to 115. The calf is soft and nontender with a negative Homans sign. Strength is progressing and the patient is ambulating appropriately.    DIAGNOSTIC STUDIES  Xrays: 3 views of the right knee (AP, lateral, and sunrise) were ordered and reviewed for evaluation of recent knee replacement. They demonstrate a well positioned, well aligned knee replacement without complicating factors noted. In comparison with previous films there has been no change.    ASSESSMENT: General follow-up status post right knee replacement.    PLAN: 1) Continue with PT exercises as prescribed   2) I will prescribe Keflex 500 mg p.o. 4 times daily x5 days for  prophylactic measures with erythema noted to his knee.   3) we will follow back up with him on his next scheduled appointment    BUFFY Kwon  2021

## 2021-08-12 ENCOUNTER — OFFICE VISIT (OUTPATIENT)
Dept: ORTHOPEDIC SURGERY | Facility: CLINIC | Age: 76
End: 2021-08-12

## 2021-08-12 VITALS — BODY MASS INDEX: 28 KG/M2 | WEIGHT: 200 LBS | HEIGHT: 71 IN | TEMPERATURE: 96.6 F

## 2021-08-12 DIAGNOSIS — Z96.651 S/P TKR (TOTAL KNEE REPLACEMENT), RIGHT: ICD-10-CM

## 2021-08-12 PROCEDURE — 99024 POSTOP FOLLOW-UP VISIT: CPT | Performed by: NURSE PRACTITIONER

## 2021-08-12 PROCEDURE — 73562 X-RAY EXAM OF KNEE 3: CPT | Performed by: NURSE PRACTITIONER

## 2021-08-12 RX ORDER — HYDROCODONE BITARTRATE AND ACETAMINOPHEN 7.5; 325 MG/1; MG/1
1 TABLET ORAL EVERY 6 HOURS PRN
Qty: 20 TABLET | Refills: 0 | Status: SHIPPED | OUTPATIENT
Start: 2021-08-12 | End: 2022-06-14

## 2021-08-12 NOTE — PROGRESS NOTES
Robin Karimi : 1945 MRN: [id number] DATE: 2021    DIAGNOSIS: 8 week follow up right total knee      SUBJECTIVE:Patient returns today for 8 week follow up of right total knee replacement. Patient reports doing well with no unusual complaints. Appears to be progressing appropriately.    OBJECTIVE:   Exam:. The incision is well healed. No sign of infection. Range of motion is measured at 0 to 106 The calf is soft and nontender with a negative Homans sign. Strength is progressing and the patient is ambulating appropriately.    DIAGNOSTIC STUDIES  Xrays: 3 views of the right knee (AP, lateral, and sunrise) were ordered and reviewed for evaluation of recent knee replacement. They demonstrate a well positioned, well aligned knee replacement without complicating factors noted. In comparison with previous films there has been no change.    ASSESSMENT: 8 week status post right knee replacement.    PLAN: 1) Continue with PT exercises as prescribed   2) Follow up 10 month with [facility] for annual visit   3) Will refill Norco 7.5, instructed this would have to be the last refill  due to the post op pain medicine policy.    Theodore Alcantara, APRN  2021

## 2021-10-20 ENCOUNTER — TRANSCRIBE ORDERS (OUTPATIENT)
Dept: LAB | Facility: HOSPITAL | Age: 76
End: 2021-10-20

## 2021-10-20 ENCOUNTER — LAB (OUTPATIENT)
Dept: LAB | Facility: HOSPITAL | Age: 76
End: 2021-10-20

## 2021-10-20 DIAGNOSIS — E11.9 DIABETES MELLITUS WITHOUT COMPLICATION (HCC): Primary | ICD-10-CM

## 2021-10-20 DIAGNOSIS — E11.9 DIABETES MELLITUS WITHOUT COMPLICATION (HCC): ICD-10-CM

## 2021-10-20 LAB
ALBUMIN SERPL-MCNC: 4.6 G/DL (ref 3.5–5.2)
ALBUMIN/GLOB SERPL: 1.8 G/DL
ALP SERPL-CCNC: 65 U/L (ref 39–117)
ALT SERPL W P-5'-P-CCNC: 31 U/L (ref 1–41)
ANION GAP SERPL CALCULATED.3IONS-SCNC: 11 MMOL/L (ref 5–15)
AST SERPL-CCNC: 22 U/L (ref 1–40)
BASOPHILS # BLD AUTO: 0.07 10*3/MM3 (ref 0–0.2)
BASOPHILS NFR BLD AUTO: 1.1 % (ref 0–1.5)
BILIRUB SERPL-MCNC: 1.6 MG/DL (ref 0–1.2)
BILIRUB UR QL STRIP: NEGATIVE
BUN SERPL-MCNC: 18 MG/DL (ref 8–23)
BUN/CREAT SERPL: 18 (ref 7–25)
CALCIUM SPEC-SCNC: 9.5 MG/DL (ref 8.6–10.5)
CHLORIDE SERPL-SCNC: 100 MMOL/L (ref 98–107)
CHOLEST SERPL-MCNC: 125 MG/DL (ref 0–200)
CLARITY UR: CLEAR
CO2 SERPL-SCNC: 29 MMOL/L (ref 22–29)
COLOR UR: YELLOW
CREAT SERPL-MCNC: 1 MG/DL (ref 0.76–1.27)
DEPRECATED RDW RBC AUTO: 40.8 FL (ref 37–54)
EOSINOPHIL # BLD AUTO: 0.17 10*3/MM3 (ref 0–0.4)
EOSINOPHIL NFR BLD AUTO: 2.6 % (ref 0.3–6.2)
ERYTHROCYTE [DISTWIDTH] IN BLOOD BY AUTOMATED COUNT: 13.3 % (ref 12.3–15.4)
GFR SERPL CREATININE-BSD FRML MDRD: 73 ML/MIN/1.73
GLOBULIN UR ELPH-MCNC: 2.6 GM/DL
GLUCOSE SERPL-MCNC: 131 MG/DL (ref 65–99)
GLUCOSE UR STRIP-MCNC: NEGATIVE MG/DL
HBA1C MFR BLD: 6.75 % (ref 4.8–5.6)
HCT VFR BLD AUTO: 42.1 % (ref 37.5–51)
HDLC SERPL-MCNC: 32 MG/DL (ref 40–60)
HGB BLD-MCNC: 13.9 G/DL (ref 13–17.7)
HGB UR QL STRIP.AUTO: NEGATIVE
IMM GRANULOCYTES # BLD AUTO: 0.02 10*3/MM3 (ref 0–0.05)
IMM GRANULOCYTES NFR BLD AUTO: 0.3 % (ref 0–0.5)
KETONES UR QL STRIP: NEGATIVE
LDLC SERPL CALC-MCNC: 74 MG/DL (ref 0–100)
LDLC/HDLC SERPL: 2.3 {RATIO}
LEUKOCYTE ESTERASE UR QL STRIP.AUTO: NEGATIVE
LYMPHOCYTES # BLD AUTO: 1.35 10*3/MM3 (ref 0.7–3.1)
LYMPHOCYTES NFR BLD AUTO: 20.6 % (ref 19.6–45.3)
MCH RBC QN AUTO: 28.1 PG (ref 26.6–33)
MCHC RBC AUTO-ENTMCNC: 33 G/DL (ref 31.5–35.7)
MCV RBC AUTO: 85.2 FL (ref 79–97)
MONOCYTES # BLD AUTO: 0.5 10*3/MM3 (ref 0.1–0.9)
MONOCYTES NFR BLD AUTO: 7.6 % (ref 5–12)
NEUTROPHILS NFR BLD AUTO: 4.45 10*3/MM3 (ref 1.7–7)
NEUTROPHILS NFR BLD AUTO: 67.8 % (ref 42.7–76)
NITRITE UR QL STRIP: NEGATIVE
NRBC BLD AUTO-RTO: 0 /100 WBC (ref 0–0.2)
PH UR STRIP.AUTO: 6.5 [PH] (ref 5–8)
PLATELET # BLD AUTO: 184 10*3/MM3 (ref 140–450)
PMV BLD AUTO: 12.6 FL (ref 6–12)
POTASSIUM SERPL-SCNC: 4.4 MMOL/L (ref 3.5–5.2)
PROT SERPL-MCNC: 7.2 G/DL (ref 6–8.5)
PROT UR QL STRIP: ABNORMAL
RBC # BLD AUTO: 4.94 10*6/MM3 (ref 4.14–5.8)
SODIUM SERPL-SCNC: 140 MMOL/L (ref 136–145)
SP GR UR STRIP: 1.02 (ref 1–1.03)
T4 FREE SERPL-MCNC: 1.28 NG/DL (ref 0.93–1.7)
TRIGL SERPL-MCNC: 97 MG/DL (ref 0–150)
TSH SERPL DL<=0.05 MIU/L-ACNC: 1.73 UIU/ML (ref 0.27–4.2)
UROBILINOGEN UR QL STRIP: ABNORMAL
VLDLC SERPL-MCNC: 19 MG/DL (ref 5–40)
WBC # BLD AUTO: 6.56 10*3/MM3 (ref 3.4–10.8)

## 2021-10-20 PROCEDURE — 80053 COMPREHEN METABOLIC PANEL: CPT

## 2021-10-20 PROCEDURE — 80061 LIPID PANEL: CPT

## 2021-10-20 PROCEDURE — 36415 COLL VENOUS BLD VENIPUNCTURE: CPT

## 2021-10-20 PROCEDURE — 84439 ASSAY OF FREE THYROXINE: CPT

## 2021-10-20 PROCEDURE — 84443 ASSAY THYROID STIM HORMONE: CPT

## 2021-10-20 PROCEDURE — 81003 URINALYSIS AUTO W/O SCOPE: CPT

## 2021-10-20 PROCEDURE — 83036 HEMOGLOBIN GLYCOSYLATED A1C: CPT

## 2021-10-20 PROCEDURE — 85025 COMPLETE CBC W/AUTO DIFF WBC: CPT

## 2022-03-02 ENCOUNTER — TELEPHONE (OUTPATIENT)
Dept: ORTHOPEDIC SURGERY | Facility: CLINIC | Age: 77
End: 2022-03-02

## 2022-03-02 NOTE — TELEPHONE ENCOUNTER
Caller: ASHA     Relationship to patient: SPOUSE     Best call back number: [phone]    Patient is needing: PATIENT'S SPOUSE, ASHA WAS CALLING TO GATHER MORE INFORMATION REGARDING A LETTER THE PATIENT RECEIVED A FEW MONTHS AGO ABOUT A DISPUTE WITH HIS INSURANCE COMPANY. ASHA STATED THE PATIENT NEVER RECEIVED A FOLLOW UP LETTER AND SHE WANTED TO CONFIRM PRIOR TO PATIENT'S APPOINTMENT IN June IF DR. MCKEON WILL BE ACCEPTING [employer] MEDICARE REPLACEMENT ANY LONGER. ASHA PROVIDED ME WITH VERY LITTLE DETAIL REGARDING EXACTLY WHAT THE LETTER SAID BUT STATED THERE WAS A DISPUTE BETWEEN DR. MCKEON AND THE INSURANCE COMPANY SO THEY WERE UNSURE IF THEY WOULD BE ACCEPTING THAT INSURANCE IN THE FUTURE. ASHA REQUESTED A CALL BACK TO DISCUSS THIS ASAP. I TRIED TO WARM TRANSFER CALL BUT WAS UNABLE TO GET THRU. THANK YOU!

## 2022-03-04 NOTE — TELEPHONE ENCOUNTER
Patient wanted to know if we take [employer] MEDICARE.  I told the patient that we participate with all humana plans except [facility]

## 2022-03-21 ENCOUNTER — CLINICAL SUPPORT (OUTPATIENT)
Dept: GASTROENTEROLOGY | Facility: CLINIC | Age: 77
End: 2022-03-21

## 2022-03-21 ENCOUNTER — PREP FOR SURGERY (OUTPATIENT)
Dept: OTHER | Facility: HOSPITAL | Age: 77
End: 2022-03-21

## 2022-03-21 DIAGNOSIS — K21.9 GASTROESOPHAGEAL REFLUX DISEASE, UNSPECIFIED WHETHER ESOPHAGITIS PRESENT: ICD-10-CM

## 2022-03-21 DIAGNOSIS — Z12.11 ENCOUNTER FOR SCREENING FOR MALIGNANT NEOPLASM OF COLON: Primary | ICD-10-CM

## 2022-03-23 PROBLEM — K21.9 GASTROESOPHAGEAL REFLUX DISEASE: Status: ACTIVE | Noted: 2022-03-23

## 2022-05-23 ENCOUNTER — HOSPITAL ENCOUNTER (OUTPATIENT)
Dept: GENERAL RADIOLOGY | Facility: HOSPITAL | Age: 77
Discharge: HOME | End: 2022-05-23
Admitting: INTERNAL MEDICINE

## 2022-05-23 ENCOUNTER — TRANSCRIBE ORDERS (OUTPATIENT)
Dept: GENERAL RADIOLOGY | Facility: HOSPITAL | Age: 77
End: 2022-05-23

## 2022-05-23 DIAGNOSIS — G47.33 SLEEP APNEA, OBSTRUCTIVE: ICD-10-CM

## 2022-05-23 DIAGNOSIS — G47.33 SLEEP APNEA, OBSTRUCTIVE: Primary | ICD-10-CM

## 2022-05-23 PROCEDURE — 71046 X-RAY EXAM CHEST 2 VIEWS: CPT

## 2022-06-14 ENCOUNTER — OFFICE VISIT (OUTPATIENT)
Dept: ORTHOPEDIC SURGERY | Facility: CLINIC | Age: 77
End: 2022-06-14

## 2022-06-14 VITALS — BODY MASS INDEX: 28.14 KG/M2 | HEIGHT: 71 IN | TEMPERATURE: 97 F | WEIGHT: 201 LBS

## 2022-06-14 DIAGNOSIS — Z96.653 STATUS POST BILATERAL KNEE REPLACEMENTS: Primary | ICD-10-CM

## 2022-06-14 PROCEDURE — 99212 OFFICE O/P EST SF 10 MIN: CPT | Performed by: ORTHOPAEDIC SURGERY

## 2022-06-14 PROCEDURE — 73562 X-RAY EXAM OF KNEE 3: CPT | Performed by: ORTHOPAEDIC SURGERY

## 2022-06-14 RX ORDER — NITROGLYCERIN 0.4 MG/1
1 TABLET SUBLINGUAL
COMMUNITY
End: 2022-07-18

## 2022-06-14 RX ORDER — LOSARTAN POTASSIUM 50 MG/1
1 TABLET ORAL DAILY
COMMUNITY
End: 2022-07-18

## 2022-06-14 RX ORDER — SENNOSIDES 8.6 MG
650 CAPSULE ORAL EVERY 8 HOURS PRN
COMMUNITY

## 2022-06-14 RX ORDER — ROSUVASTATIN CALCIUM 10 MG/1
10 TABLET, COATED ORAL DAILY
COMMUNITY
Start: 2022-04-19 | End: 2022-07-18 | Stop reason: SDUPTHER

## 2022-06-14 RX ORDER — METOPROLOL SUCCINATE 50 MG/1
TABLET, EXTENDED RELEASE ORAL
COMMUNITY
Start: 2022-04-19 | End: 2022-07-18

## 2022-06-14 RX ORDER — CETIRIZINE HYDROCHLORIDE 10 MG/1
1 CAPSULE, LIQUID FILLED ORAL
COMMUNITY
End: 2022-07-18

## 2022-06-14 NOTE — PROGRESS NOTES
"Robin Karimi : 1945 MRN: [id number] DATE: 2022    Chief Complaint:  Follow up right total knee      SUBJECTIVE:Patient returns today for  1 year follow up of right total knee replacement. Patient reports doing well with no unusual complaints. Denies any limitations due to the knee.    OBJECTIVE:    Temp 97 °F (36.1 °C)   Ht 180.3 cm (71\")   Wt 91.2 kg (201 lb)   BMI 28.03 kg/m²   Family History   Problem Relation Age of Onset   • Stomach cancer Other    • Diabetes Other    • Heart disease Other    • Hypertension Other    • Malig Hyperthermia Neg Hx    • Colon cancer Neg Hx      Past Medical History:   Diagnosis Date   • Asthma    • Cancer (HCC)     MELANOMA REMOVED FROM NOSE AND LT SHOULDER   • Carpal tunnel syndrome     LT HAND   • Coronary artery disease    • Diabetes mellitus (HCC)     TYPE 2   • Diverticulosis of colon    • Factor 5 Leiden mutation, heterozygous (HCC)    • GERD (gastroesophageal reflux disease)    • H/O cornea transplant    • Hip pain, chronic, left    • History of melanoma excision     LT SHOULDER AND NOSE   • History of skin cancer    • History of stomach ulcers    • Hypertension    • IBS (irritable bowel syndrome)    • Infectious viral hepatitis     CHILDHOOD   • Joint pain    • Neuropathy     IN FEET AND HANDS   • Numbness or tingling     LEFT HAND   • PONV (postoperative nausea and vomiting)    • Right knee pain    • Ringing in ears    • Sleep apnea     CPAP   • Stiffness in joint      Past Surgical History:   Procedure Laterality Date   • CARDIAC CATHETERIZATION      X2   • CARPAL TUNNEL RELEASE Left 2021    Procedure: CARPAL TUNNEL RELEASE;  Surgeon: Ren March MD;  [place] [facility] [place] [facility]  Service: [place]opedics;  Laterality: Left;   • CHOLECYSTECTOMY     • COLONOSCOPY     • CORNEAL TRANSPLANT      GARETT   • CORONARY ANGIOPLASTY      and    • EXCISION LESION Left     LT SHOULDER/  MELANOMA REMOVED   • KNEE ARTHROSCOPY Right 3/20/2018    Procedure: RIGHT " KNEE ARTHROSCOPY, PARTIAL MEDIAL MENISECTOMY, ABRASSSION CHONDROPLASTY, AND LOOSE BODY REMOVAL;  Surgeon: Logan Roblero MD;  Location: [facility];  Service: Orthopedics   • KNEE SURGERY Right 06/23/2015    SCOPE   • NASAL MASS EXCISION  2016    MELANOMA   • ROTATOR CUFF REPAIR Left 2016   • TOTAL KNEE ARTHROPLASTY Left 2/12/2019    Procedure: TOTAL KNEE ARTHROPLASTY;  Surgeon: Seth Floyd MD;  Location: [facility] MAIN OR;  Service: Orthopedics   • TOTAL KNEE ARTHROPLASTY Right 6/14/2021    Procedure: TOTAL KNEE ARTHROPLASTY;  Surgeon: Seth Floyd MD;  Location: [facility] MAIN OR;  Service: Orthopedics;  Laterality: Right;     Social History     Socioeconomic History   • Marital status:    Tobacco Use   • Smoking status: Never Smoker   • Smokeless tobacco: Never Used   Vaping Use   • Vaping Use: Never used   Substance and Sexual Activity   • Alcohol use: No   • Drug use: No   • Sexual activity: Defer       Review of Systems: 14 point review of systems performed pertinent positives and negatives discussed above, all other systems are negative    Exam:. The incision is well healed. Range of motion is measured at 0 to 120. The calf is soft and nontender with a negative Homans sign. Alignment is neutral. Good quad strength. There is no evidence of varus/valgus or flexion instability. No effusion. Intact to light touch with palpable distal pulses.     DIAGNOSTIC STUDIES  Xrays: 3 views(AP bilateral knees, lateral right, and sunrise bilateral knees) were ordered and reviewed for evaluation of right knee replacement. They demonstrate a well positioned, well aligned knee replacement without complicating factors noted. In comparison with previous films there has been no change.    ASSESSMENT:   Annual follow up right knee replacement. doing well       PLAN:    Continue activities as tolerated  Follow up DARYL Floyd MD  6/14/2022

## 2022-07-18 ENCOUNTER — OFFICE VISIT (OUTPATIENT)
Dept: CARDIOLOGY | Facility: CLINIC | Age: 77
End: 2022-07-18

## 2022-07-18 VITALS
SYSTOLIC BLOOD PRESSURE: 152 MMHG | HEART RATE: 62 BPM | DIASTOLIC BLOOD PRESSURE: 76 MMHG | WEIGHT: 204.4 LBS | BODY MASS INDEX: 28.51 KG/M2

## 2022-07-18 DIAGNOSIS — R00.2 PALPITATIONS: ICD-10-CM

## 2022-07-18 DIAGNOSIS — I10 PRIMARY HYPERTENSION: Primary | Chronic | ICD-10-CM

## 2022-07-18 DIAGNOSIS — E78.2 MIXED HYPERLIPIDEMIA: ICD-10-CM

## 2022-07-18 DIAGNOSIS — I25.10 CORONARY ARTERY DISEASE INVOLVING NATIVE HEART WITHOUT ANGINA PECTORIS, UNSPECIFIED VESSEL OR LESION TYPE: ICD-10-CM

## 2022-07-18 PROCEDURE — 99204 OFFICE O/P NEW MOD 45 MIN: CPT | Performed by: INTERNAL MEDICINE

## 2022-07-18 RX ORDER — ROSUVASTATIN CALCIUM 10 MG/1
10 TABLET, COATED ORAL DAILY
Qty: 90 TABLET | Refills: 3 | Status: SHIPPED | OUTPATIENT
Start: 2022-07-18 | End: 2023-01-26

## 2022-07-18 RX ORDER — AMLODIPINE BESYLATE 10 MG/1
10 TABLET ORAL DAILY
Qty: 90 TABLET | Refills: 3 | Status: SHIPPED | OUTPATIENT
Start: 2022-07-18

## 2022-07-18 RX ORDER — METOPROLOL TARTRATE 50 MG/1
50 TABLET, FILM COATED ORAL 2 TIMES DAILY
Qty: 180 TABLET | Refills: 3 | Status: SHIPPED | OUTPATIENT
Start: 2022-07-18

## 2022-07-18 RX ORDER — LOSARTAN POTASSIUM AND HYDROCHLOROTHIAZIDE 12.5; 5 MG/1; MG/1
1 TABLET ORAL EVERY MORNING
Qty: 90 TABLET | Refills: 3 | Status: SHIPPED | OUTPATIENT
Start: 2022-07-18

## 2022-07-18 NOTE — PROGRESS NOTES
Chief Complaint  Hypertension    Subjective            Robin Karimi presents to [facility] CARDIOLOGY  History of present illness:    Patient is a 77-year-old male with past medical history significant for hypertension, hyperlipidemia, diabetes, and mild nonobstructive coronary artery disease.  Patient had a left heart cath done at [facility] in which he had 30% left main ostial stenosis and 30% stenosis in an OM branch.  This was back in .  He was following with cardiology at the [facility] but wants to have a cardiologist local.  He brings in blood pressure recordings that he does nightly that are under excellent control.  He does take an aspirin 81 mg each day.  He notes very rare 1 second palpitation that does not bother him.  He is somewhat active remodeling the bathroom and playing golf once a week.  When he does golf it is a part of 3 and he walks it.  He notes no exertional chest pain.  He notes very mild shortness of breath that he attributes to not exercising regularly.  He denies any edema.  He does not smoke.  His father  of a CVA.      Past Medical History:   Diagnosis Date   • Asthma    • Cancer (HCC)     MELANOMA REMOVED FROM NOSE AND LT SHOULDER   • Carpal tunnel syndrome     LT HAND   • Coronary artery disease    • Diabetes mellitus (HCC)     TYPE 2   • Diverticulosis of colon    • Factor 5 Leiden mutation, heterozygous (HCC)    • GERD (gastroesophageal reflux disease)    • H/O cornea transplant    • Hip pain, chronic, left    • History of melanoma excision     LT SHOULDER AND NOSE   • History of skin cancer    • History of stomach ulcers    • Hypertension    • IBS (irritable bowel syndrome)    • Infectious viral hepatitis     CHILDHOOD   • Joint pain    • Neuropathy     IN FEET AND HANDS   • Numbness or tingling     LEFT HAND   • PONV (postoperative nausea and vomiting)    • Right knee pain    • Ringing in ears    • Sleep apnea      CPAP   • Stiffness in joint            Past Surgical History:   Procedure Laterality Date   • CARDIAC CATHETERIZATION      X2   • CARPAL TUNNEL RELEASE Left 4/20/2021    Procedure: CARPAL TUNNEL RELEASE;  Surgeon: Ren March MD;  [place]  [facility] [place] [facility] [place];  Service: Orthopedics;  Laterality: Left;   • CHOLECYSTECTOMY     • COLONOSCOPY     • CORNEAL TRANSPLANT      GARETT   • CORONARY ANGIOPLASTY  2015    and 2011   • EXCISION LESION Left     LT SHOULDER/  MELANOMA REMOVED   • KNEE ARTHROSCOPY Right 3/20/2018    Procedure: RIGHT KNEE ARTHROSCOPY, PARTIAL MEDIAL MENISECTOMY, ABRASSSION CHONDROPLASTY, AND LOOSE BODY REMOVAL;  Surgeon: Logan Roblero MD;  Location:  [facility];  Service: Orthopedics   • KNEE SURGERY Right 06/23/2015    SCOPE   • NASAL MASS EXCISION  2016    MELANOMA   • ROTATOR CUFF REPAIR Left 2016   • TOTAL KNEE ARTHROPLASTY Left 2/12/2019    Procedure: TOTAL KNEE ARTHROPLASTY;  Surgeon: Seth Floyd MD;  Location: [facility] MAIN OR;  Service: Orthopedics   • TOTAL KNEE ARTHROPLASTY Right 6/14/2021    Procedure: TOTAL KNEE ARTHROPLASTY;  Surgeon: Seth Floyd MD;  Location: [facility] MAIN OR;  Service: Orthopedics;  Laterality: Right;          Social History     Socioeconomic History   • Marital status:    Tobacco Use   • Smoking status: Never Smoker   • Smokeless tobacco: Never Used   Vaping Use   • Vaping Use: Never used   Substance and Sexual Activity   • Alcohol use: No   • Drug use: No   • Sexual activity: Defer           Family History   Problem Relation Age of Onset   • Stomach cancer Other    • Diabetes Other    • Heart disease Other    • Hypertension Other    • Malig Hyperthermia Neg Hx    • Colon cancer Neg Hx             Allergies   Allergen Reactions   • Benazepril-Hydrochlorothiazide Cough   • Zocor [Simvastatin] Other (See Comments)     MUSCLE PAIN            Current Outpatient Medications:   •  acetaminophen (TYLENOL) 650 MG 8 hr tablet, Take 1 tablet by mouth.,  Disp: , Rfl:   •  amLODIPine (NORVASC) 10 MG tablet, Take 1 tablet by mouth Daily., Disp: 90 tablet, Rfl: 3  •  cetirizine (ZyrTEC) 10 MG tablet, Take 10 mg by mouth Daily As Needed., Disp: , Rfl:   •  docusate sodium (COLACE) 100 MG capsule, Take 1 capsule by mouth 2 (Two) Times a Day. (Patient taking differently: Take 100 mg by mouth Daily As Needed.), Disp: 60 capsule, Rfl: 0  •  glipiZIDE (GLUCOTROL) 10 MG tablet, Take 10 mg by mouth 2 (Two) Times a Day Before Meals., Disp: , Rfl:   •  hydrocortisone 2.5 % ointment, APPLY A THIN LAYER TO THE AFFECTED AREA (NOSE) TWICE A DAY AS NEEDED, Disp: , Rfl:   •  Insulin Detemir (LEVEMIR FLEXPEN SC), Inject 34 Units under the skin into the appropriate area as directed Every Evening., Disp: , Rfl:   •  Lactobacillus (PROBIOTIC ACIDOPHILUS) capsule, Take 1 tablet/day by mouth Take As Directed. TAKES 4 X A WEEK, Disp: , Rfl:   •  losartan-hydrochlorothiazide (HYZAAR) 50-12.5 MG per tablet, Take 1 tablet by mouth Every Morning., Disp: 90 tablet, Rfl: 3  •  loteprednol (LOTEMAX) 0.5 % ophthalmic suspension, Administer 1 drop to both eyes Every Morning., Disp: , Rfl:   •  metFORMIN (GLUCOPHAGE) 500 MG tablet, Take 500 mg by mouth 2 (Two) Times a Day With Meals., Disp: , Rfl:   •  metoprolol tartrate (LOPRESSOR) 50 MG tablet, Take 1 tablet by mouth 2 (Two) Times a Day., Disp: 180 tablet, Rfl: 3  •  nitroglycerin (NITROSTAT) 0.4 MG SL tablet, Place 0.4 mg under the tongue Every 5 (Five) Minutes As Needed for Chest Pain. Take no more than 3 doses in 15 minutes., Disp: , Rfl:   •  pantoprazole (PROTONIX) 40 MG EC tablet, Take 40 mg by mouth Every Morning., Disp: , Rfl: 1  •  polyethylene glycol (GoLYTELY) 236 g solution, Starting at noon on day prior to procedure, drink 8 ounces every 30 minutes until all gone or stools are clear. May add flavor packet., Disp: 4000 mL, Rfl: 0  •  RELION PEN NEEDLE 31G/8MM 31G X 8 MM misc, USE 1 ONCE DAILY WITH INSULIN INJECTION, Disp: , Rfl:   •   rosuvastatin (CRESTOR) 10 MG tablet, Take 1 tablet by mouth Daily., Disp: 90 tablet, Rfl: 3  No current facility-administered medications for this visit.    Facility-Administered Medications Ordered in Other Visits:   •  Chlorhexidine Gluconate 2 % pads 2 each, 2 pad, Apply externally, BID, Seth Flyod MD      ROS:  Cardiac review of systems positive for mild shortness of breath and very rare 1 second palpitations.    Objective     /76   Pulse 62   Wt 92.7 kg (204 lb 6.4 oz)   BMI 28.51 kg/m²       General Appearance:   · well developed  · well nourished  HENT:   · oropharynx moist  · lips not cyanotic  Respiratory:  · no respiratory distress  · normal breath sounds  · no rales  Cardiovascular:  · no jugular venous distention  · regular rhythm  · S1 normal, S2 normal  · no S3, no S4   · no murmur  · no rub, no thrill  · No carotid bruit  · pedal pulses normal  · lower extremity edema: none    Musculoskeletal:  · no clubbing of fingers.   · normocephalic, head atraumatic  Skin:   · warm, dry  Psychiatric:  · judgement and insight appropriate  · normal mood and affect          Procedures                      ASSESSMENT:  Encounter Diagnoses   Name Primary?   • Primary hypertension Yes   • Coronary artery disease involving native heart without angina pectoris, unspecified vessel or lesion type    • Mixed hyperlipidemia    • Palpitations          PLAN:    1.  Patient had 2 left heart caths.  The last one was in 2009 in which she had a 30% left main ostial stenosis and 30% OM branch stenosis.  He is golfing once a week and walking the course with no chest pain.  He notes he gets slightly winded.  I did ask him to start a regular walking program and let us know if any chest pain or if his breathing does not improve.  2.  Blood pressure is elevated but the patient takes his blood pressure every day and tracks it.  It looks excellent by his results.  I am going to have him come back in 1 to 2 weeks and bring his  blood pressure cuff to check it against ours.  If it is accurate we will just continue to monitor.  3.  Patient notes very rare 1 second palpitations.  They do not bother him.  We will just continue to monitor.  4.  Continue the Crestor.  Patient had cholesterol checked October 20, 2021 with LDL 74, HDL 32, and triglycerides 97.  These are under good control.  5.  We will see back in 6 months, sooner if needed.          Patient was given instructions and counseling regarding his condition or for health maintenance advice. Please see specific information pulled into the AVS if appropriate.         Ren Reese MD   7/18/2022  12:26 EDT

## 2022-07-19 ENCOUNTER — OFFICE VISIT (OUTPATIENT)
Dept: CARDIOLOGY | Facility: CLINIC | Age: 77
End: 2022-07-19

## 2022-07-19 VITALS
SYSTOLIC BLOOD PRESSURE: 129 MMHG | DIASTOLIC BLOOD PRESSURE: 62 MMHG | HEART RATE: 60 BPM | WEIGHT: 203 LBS | HEIGHT: 71 IN | BODY MASS INDEX: 28.42 KG/M2

## 2022-07-19 DIAGNOSIS — I10 PRIMARY HYPERTENSION: Primary | Chronic | ICD-10-CM

## 2022-07-19 PROCEDURE — 99024 POSTOP FOLLOW-UP VISIT: CPT | Performed by: INTERNAL MEDICINE

## 2022-07-22 ENCOUNTER — PATIENT ROUNDING (BHMG ONLY) (OUTPATIENT)
Dept: CARDIOLOGY | Facility: CLINIC | Age: 77
End: 2022-07-22

## 2022-07-22 NOTE — PROGRESS NOTES
"July 22, 2022    Hello, may I speak with Robin Karimi?    My name is JACQUELINE     I am  [place] [facility] [facility]  [facility]IOLOG[facility]  [address] [place] [address] [phone].    Before we get started may I verify your date of birth? 1945            I am calling to officially welcome you to our practice and ask about your recent visit. Is this a good time to talk?   YES    Tell me about your visit with us. What things went well?    \"The doctor was very informational.  I appreciate the time that he took with me.  He explained everything very well.\"       We're always looking for ways to make our patients' experiences even better. Do you have recommendations on ways we may improve?    \"Not at all.\"    Overall were you satisfied with your first visit to our practice?   Yes       I appreciate you taking the time to speak with me today. Is there anything else I can do for you?   No     Thank you, and have a great day.      "

## 2022-08-17 ENCOUNTER — ANESTHESIA EVENT (OUTPATIENT)
Dept: GASTROENTEROLOGY | Facility: HOSPITAL | Age: 77
End: 2022-08-17

## 2022-08-17 ENCOUNTER — HOSPITAL ENCOUNTER (OUTPATIENT)
Facility: HOSPITAL | Age: 77
Setting detail: HOSPITAL OUTPATIENT SURGERY
Discharge: HOME | End: 2022-08-17
Attending: INTERNAL MEDICINE | Admitting: INTERNAL MEDICINE

## 2022-08-17 ENCOUNTER — ANESTHESIA (OUTPATIENT)
Dept: GASTROENTEROLOGY | Facility: HOSPITAL | Age: 77
End: 2022-08-17

## 2022-08-17 VITALS
HEART RATE: 64 BPM | BODY MASS INDEX: 27.67 KG/M2 | RESPIRATION RATE: 21 BRPM | TEMPERATURE: 97.8 F | OXYGEN SATURATION: 100 % | WEIGHT: 198.41 LBS | DIASTOLIC BLOOD PRESSURE: 81 MMHG | SYSTOLIC BLOOD PRESSURE: 137 MMHG

## 2022-08-17 DIAGNOSIS — K21.9 GASTROESOPHAGEAL REFLUX DISEASE, UNSPECIFIED WHETHER ESOPHAGITIS PRESENT: ICD-10-CM

## 2022-08-17 LAB — GLUCOSE BLDC GLUCOMTR-MCNC: 202 MG/DL (ref 70–99)

## 2022-08-17 PROCEDURE — 45385 COLONOSCOPY W/LESION REMOVAL: CPT | Performed by: INTERNAL MEDICINE

## 2022-08-17 PROCEDURE — 88305 TISSUE EXAM BY PATHOLOGIST: CPT | Performed by: INTERNAL MEDICINE

## 2022-08-17 PROCEDURE — 25010000002 PROPOFOL 10 MG/ML EMULSION: Performed by: NURSE ANESTHETIST, CERTIFIED REGISTERED

## 2022-08-17 PROCEDURE — 43239 EGD BIOPSY SINGLE/MULTIPLE: CPT | Performed by: INTERNAL MEDICINE

## 2022-08-17 PROCEDURE — 82962 GLUCOSE BLOOD TEST: CPT

## 2022-08-17 PROCEDURE — 43251 EGD REMOVE LESION SNARE: CPT | Performed by: INTERNAL MEDICINE

## 2022-08-17 DEVICE — DEV CLIP HEMO RESOLUTION360/ULTR 235CM 17MM STRL: Type: IMPLANTABLE DEVICE | Site: DUODENUM | Status: FUNCTIONAL

## 2022-08-17 RX ORDER — PROPOFOL 10 MG/ML
VIAL (ML) INTRAVENOUS AS NEEDED
Status: DISCONTINUED | OUTPATIENT
Start: 2022-08-17 | End: 2022-08-17 | Stop reason: SURG

## 2022-08-17 RX ORDER — SODIUM CHLORIDE, SODIUM LACTATE, POTASSIUM CHLORIDE, CALCIUM CHLORIDE 600; 310; 30; 20 MG/100ML; MG/100ML; MG/100ML; MG/100ML
30 INJECTION, SOLUTION INTRAVENOUS CONTINUOUS
Status: DISCONTINUED | OUTPATIENT
Start: 2022-08-17 | End: 2022-08-17 | Stop reason: HOSPADM

## 2022-08-17 RX ORDER — SODIUM CHLORIDE 0.9 % (FLUSH) 0.9 %
10 SYRINGE (ML) INJECTION AS NEEDED
Status: DISCONTINUED | OUTPATIENT
Start: 2022-08-17 | End: 2022-08-17 | Stop reason: HOSPADM

## 2022-08-17 RX ORDER — SODIUM CHLORIDE, SODIUM LACTATE, POTASSIUM CHLORIDE, CALCIUM CHLORIDE 600; 310; 30; 20 MG/100ML; MG/100ML; MG/100ML; MG/100ML
1000 INJECTION, SOLUTION INTRAVENOUS CONTINUOUS
Status: DISCONTINUED | OUTPATIENT
Start: 2022-08-17 | End: 2022-08-17 | Stop reason: HOSPADM

## 2022-08-17 RX ORDER — LIDOCAINE HYDROCHLORIDE 20 MG/ML
INJECTION, SOLUTION EPIDURAL; INFILTRATION; INTRACAUDAL; PERINEURAL AS NEEDED
Status: DISCONTINUED | OUTPATIENT
Start: 2022-08-17 | End: 2022-08-17 | Stop reason: SURG

## 2022-08-17 RX ADMIN — SODIUM CHLORIDE, POTASSIUM CHLORIDE, SODIUM LACTATE AND CALCIUM CHLORIDE 1000 ML: 600; 310; 30; 20 INJECTION, SOLUTION INTRAVENOUS at 06:40

## 2022-08-17 RX ADMIN — PROPOFOL 175 MCG/KG/MIN: 10 INJECTION, EMULSION INTRAVENOUS at 07:45

## 2022-08-17 RX ADMIN — LIDOCAINE HYDROCHLORIDE 50 MG: 20 INJECTION, SOLUTION EPIDURAL; INFILTRATION; INTRACAUDAL; PERINEURAL at 07:45

## 2022-08-17 RX ADMIN — PROPOFOL 100 MG: 10 INJECTION, EMULSION INTRAVENOUS at 07:45

## 2022-08-17 NOTE — ANESTHESIA POSTPROCEDURE EVALUATION
Patient: Robin Karimi    Procedure Summary     Date: 08/17/22 Room / Location: [facility] ENDOSCOPY 4 / [facility] ENDOSCOPY    Anesthesia Start: 0743 Anesthesia Stop: 0823    Procedures:       ESOPHAGOGASTRODUODENOSCOPY WITH POLYPECTOMY, ENDO CLIP APPLICATION X1. BIOPSY (N/A )      COLONOSCOPY WITH POLYPECTOMY (N/A ) Diagnosis:       Gastroesophageal reflux disease, unspecified whether esophagitis present      (Gastroesophageal reflux disease, unspecified whether esophagitis present [K21.9])    Surgeons: Ozzy Hudson MD Provider: Roman Mayo MD    Anesthesia Type: general ASA Status: 3          Anesthesia Type: general    Vitals  Vitals Value Taken Time   /81 08/17/22 0838   Temp 36.6 °C (97.8 °F) 08/17/22 0837   Pulse 63 08/17/22 0839   Resp 21 08/17/22 0837   SpO2 99 % 08/17/22 0839   Vitals shown include unvalidated device data.        Post Anesthesia Care and Evaluation    Patient location during evaluation: bedside  Patient participation: complete - patient participated  Level of consciousness: awake  Pain score: 0  Pain management: adequate    Airway patency: patent  Anesthetic complications: No anesthetic complications  PONV Status: none  Cardiovascular status: acceptable and stable  Respiratory status: acceptable and room air  Hydration status: acceptable    Comments: An Anesthesiologist personally participated in the most demanding procedures (including induction and emergence if applicable) in the anesthesia plan, monitored the course of anesthesia administration at frequent intervals and remained physically present and available for immediate diagnosis and treatment of emergencies.

## 2022-08-17 NOTE — H&P
Pre Procedure History & Physical    Chief Complaint:   Heartburn and screening    Subjective     HPI:   Persistent heartburn and colon cancer screening    Past Medical History:   Past Medical History:   Diagnosis Date   • Asthma    • Cancer (HCC)     MELANOMA REMOVED FROM NOSE AND LT SHOULDER   • Carpal tunnel syndrome     LT HAND   • Coronary artery disease    • Diabetes mellitus (HCC)     TYPE 2   • Diverticulosis of colon    • Factor 5 Leiden mutation, heterozygous (HCC)    • GERD (gastroesophageal reflux disease)    • H/O cornea transplant    • Hip pain, chronic, left    • History of melanoma excision     LT SHOULDER AND NOSE   • History of skin cancer    • History of stomach ulcers    • Hypertension    • IBS (irritable bowel syndrome)    • Infectious viral hepatitis     CHILDHOOD   • Joint pain    • Neuropathy     IN FEET AND HANDS   • Numbness or tingling     LEFT HAND   • PONV (postoperative nausea and vomiting)    • Right knee pain    • Ringing in ears    • Sleep apnea     CPAP   • Stiffness in joint        Past Surgical History:  Past Surgical History:   Procedure Laterality Date   • CARDIAC CATHETERIZATION      X2   • CARPAL TUNNEL RELEASE Left 4/20/2021    Procedure: CARPAL TUNNEL RELEASE;  Surgeon: Ren March MD;  [place] [facility] [place] [facility] [place]  Service: Orthopedics;  Laterality: Left;   • CHOLECYSTECTOMY     • COLONOSCOPY     • CORNEAL TRANSPLANT      GARETT   • CORONARY ANGIOPLASTY  2015    and 2011   • EXCISION LESION Left     LT SHOULDER/  MELANOMA REMOVED   • KNEE ARTHROSCOPY Right 3/20/2018    Procedure: RIGHT KNEE ARTHROSCOPY, PARTIAL MEDIAL MENISECTOMY, ABRASSSION CHONDROPLASTY, AND LOOSE BODY REMOVAL;  Surgeon: Logan Roblero MD;  [place] [facility] [place] [facility] [place]  Service: Orthopedics   • KNEE SURGERY Right 06/23/2015    SCOPE   • NASAL MASS EXCISION  2016    MELANOMA   • ROTATOR CUFF REPAIR Left 2016   • TOTAL KNEE ARTHROPLASTY Left 2/12/2019    Procedure: TOTAL KNEE ARTHROPLASTY;  Surgeon:  Seth Floyd MD;  Location: [facility] MAIN OR;  Service: Orthopedics   • TOTAL KNEE ARTHROPLASTY Right 6/14/2021    Procedure: TOTAL KNEE ARTHROPLASTY;  Surgeon: Seth Floyd MD;  Location: [facility] MAIN OR;  Service: Orthopedics;  Laterality: Right;       Family History:  Family History   Problem Relation Age of Onset   • Stomach cancer Other    • Diabetes Other    • Heart disease Other    • Hypertension Other    • Malig Hyperthermia Neg Hx    • Colon cancer Neg Hx        Social History:   reports that he has never smoked. He has never used smokeless tobacco. He reports that he does not drink alcohol and does not use drugs.    Medications:   Medications Prior to Admission   Medication Sig Dispense Refill Last Dose   • acetaminophen (TYLENOL) 650 MG 8 hr tablet Take 650 mg by mouth Every 8 (Eight) Hours As Needed.   Past Week at Unknown time   • amLODIPine (NORVASC) 10 MG tablet Take 1 tablet by mouth Daily. 90 tablet 3 8/17/2022 at Unknown time   • cetirizine (ZyrTEC) 10 MG tablet Take 10 mg by mouth Daily As Needed.   8/15/2022   • glipiZIDE (GLUCOTROL) 10 MG tablet Take 10 mg by mouth 2 (Two) Times a Day Before Meals.   8/16/2022 at Unknown time   • hydrocortisone 2.5 % ointment APPLY A THIN LAYER TO THE AFFECTED AREA (NOSE) TWICE A DAY AS NEEDED      • Insulin Detemir (LEVEMIR FLEXPEN SC) Inject 38 Units under the skin into the appropriate area as directed Every Evening.   8/15/2022   • Lactobacillus (PROBIOTIC ACIDOPHILUS) capsule Take 1 tablet/day by mouth Take As Directed. TAKES 4 X A WEEK   8/15/2022   • losartan-hydrochlorothiazide (HYZAAR) 50-12.5 MG per tablet Take 1 tablet by mouth Every Morning. 90 tablet 3 8/16/2022 at Unknown time   • loteprednol (LOTEMAX) 0.5 % ophthalmic suspension Administer 1 drop to both eyes Every Morning.   8/16/2022 at Unknown time   • metFORMIN (GLUCOPHAGE) 500 MG tablet Take 500 mg by mouth 2 (Two) Times a Day With Meals.   8/16/2022 at Unknown time   • metoprolol tartrate  (LOPRESSOR) 50 MG tablet Take 1 tablet by mouth 2 (Two) Times a Day. 180 tablet 3 8/17/2022 at Unknown time   • pantoprazole (PROTONIX) 40 MG EC tablet Take 40 mg by mouth Every Morning.  1 Past Week at Unknown time   • rosuvastatin (CRESTOR) 10 MG tablet Take 1 tablet by mouth Daily. 90 tablet 3 8/16/2022 at Unknown time   • docusate sodium (COLACE) 100 MG capsule Take 1 capsule by mouth 2 (Two) Times a Day. (Patient taking differently: Take 100 mg by mouth Daily As Needed.) 60 capsule 0 More than a month at Unknown time   • nitroglycerin (NITROSTAT) 0.4 MG SL tablet Place 0.4 mg under the tongue Every 5 (Five) Minutes As Needed for Chest Pain. Take no more than 3 doses in 15 minutes.   More than a month at Unknown time   • RELION PEN NEEDLE 31G/8MM 31G X 8 MM misc USE 1 ONCE DAILY WITH INSULIN INJECTION          Allergies:  Benazepril-hydrochlorothiazide and Zocor [simvastatin]        Objective     Blood pressure 140/75, pulse 58, temperature 98.5 °F (36.9 °C), temperature source Temporal, resp. rate 16, weight 90 kg (198 lb 6.6 oz), SpO2 99 %.    Physical Exam   Constitutional: Pt is oriented to person, place, and time and well-developed, well-nourished, and in no distress.   Mouth/Throat: Oropharynx is clear and moist.   Neck: Normal range of motion.   Cardiovascular: Normal rate, regular rhythm and normal heart sounds.    Pulmonary/Chest: Effort normal and breath sounds normal.   Abdominal: Soft. Nontender  Skin: Skin is warm and dry.   Psychiatric: Mood, memory, affect and judgment normal.     Assessment & Plan     Diagnosis:  Heartburn and colon cancer screening    Anticipated Surgical Procedure:  EGD and colonoscopy    The risks, benefits, and alternatives of this procedure have been discussed with the patient or the responsible party- the patient understands and agrees to proceed.

## 2022-08-17 NOTE — ANESTHESIA PREPROCEDURE EVALUATION
Anesthesia Evaluation     Patient summary reviewed and Nursing notes reviewed   history of anesthetic complications: PONV  NPO Solid Status: > 8 hours  NPO Liquid Status: > 2 hours           Airway   Mallampati: III  TM distance: >3 FB  Neck ROM: full  No difficulty expected  Dental      Pulmonary - normal exam    breath sounds clear to auscultation  (+) asthma,sleep apnea,   Cardiovascular - normal exam  Exercise tolerance: good (4-7 METS)    ECG reviewed  Patient on routine beta blocker and Beta blocker given within 24 hours of surgery  Rhythm: regular  Rate: normal    (+) hypertension, CAD,       Neuro/Psych- negative ROS  GI/Hepatic/Renal/Endo    (+)  GERD,  hepatitis, liver disease, diabetes mellitus type 2,     Musculoskeletal (-) negative ROS    Abdominal    Substance History - negative use     OB/GYN negative ob/gyn ROS         Other - negative ROS              NORMAL ECG -  Sinus rhythm  NO SIGNIFICANT CHANGE FROM PREVIOUS ECG         Anesthesia Plan    ASA 3     general     (Total IV Anesthesia    Patient understands anesthesia not responsible for dental damage.  )  intravenous induction     Anesthetic plan, risks, benefits, and alternatives have been provided, discussed and informed consent has been obtained with: patient.    Plan discussed with CRNA.        CODE STATUS:

## 2022-08-19 LAB
CYTO UR: NORMAL
LAB AP CASE REPORT: NORMAL
LAB AP CLINICAL INFORMATION: NORMAL
PATH REPORT.FINAL DX SPEC: NORMAL
PATH REPORT.GROSS SPEC: NORMAL

## 2022-08-26 ENCOUNTER — TRANSCRIBE ORDERS (OUTPATIENT)
Dept: LAB | Facility: HOSPITAL | Age: 77
End: 2022-08-26

## 2022-08-26 ENCOUNTER — LAB (OUTPATIENT)
Dept: LAB | Facility: HOSPITAL | Age: 77
End: 2022-08-26

## 2022-08-26 DIAGNOSIS — E55.9 VITAMIN D DEFICIENCY: ICD-10-CM

## 2022-08-26 DIAGNOSIS — E11.9 DIABETES MELLITUS WITHOUT COMPLICATION: Primary | ICD-10-CM

## 2022-08-26 DIAGNOSIS — E11.9 DIABETES MELLITUS WITHOUT COMPLICATION: ICD-10-CM

## 2022-08-26 LAB
25(OH)D3 SERPL-MCNC: 41 NG/ML (ref 30–100)
ALBUMIN SERPL-MCNC: 4.4 G/DL (ref 3.5–5.2)
ALBUMIN/GLOB SERPL: 1.8 G/DL
ALP SERPL-CCNC: 52 U/L (ref 39–117)
ALT SERPL W P-5'-P-CCNC: 20 U/L (ref 1–41)
ANION GAP SERPL CALCULATED.3IONS-SCNC: 11 MMOL/L (ref 5–15)
AST SERPL-CCNC: 22 U/L (ref 1–40)
BASOPHILS # BLD AUTO: 0.08 10*3/MM3 (ref 0–0.2)
BASOPHILS NFR BLD AUTO: 1.3 % (ref 0–1.5)
BILIRUB SERPL-MCNC: 1.4 MG/DL (ref 0–1.2)
BUN SERPL-MCNC: 24 MG/DL (ref 8–23)
BUN/CREAT SERPL: 19.4 (ref 7–25)
CALCIUM SPEC-SCNC: 9.8 MG/DL (ref 8.6–10.5)
CHLORIDE SERPL-SCNC: 101 MMOL/L (ref 98–107)
CHOLEST SERPL-MCNC: 112 MG/DL (ref 0–200)
CO2 SERPL-SCNC: 28 MMOL/L (ref 22–29)
CREAT SERPL-MCNC: 1.24 MG/DL (ref 0.76–1.27)
DEPRECATED RDW RBC AUTO: 41.2 FL (ref 37–54)
EGFRCR SERPLBLD CKD-EPI 2021: 60.6 ML/MIN/1.73
EOSINOPHIL # BLD AUTO: 0.3 10*3/MM3 (ref 0–0.4)
EOSINOPHIL NFR BLD AUTO: 4.8 % (ref 0.3–6.2)
ERYTHROCYTE [DISTWIDTH] IN BLOOD BY AUTOMATED COUNT: 12.8 % (ref 12.3–15.4)
GLOBULIN UR ELPH-MCNC: 2.4 GM/DL
GLUCOSE SERPL-MCNC: 103 MG/DL (ref 65–99)
HBA1C MFR BLD: 7.3 % (ref 4.8–5.6)
HCT VFR BLD AUTO: 42.7 % (ref 37.5–51)
HDLC SERPL-MCNC: 33 MG/DL (ref 40–60)
HGB BLD-MCNC: 14 G/DL (ref 13–17.7)
IMM GRANULOCYTES # BLD AUTO: 0.02 10*3/MM3 (ref 0–0.05)
IMM GRANULOCYTES NFR BLD AUTO: 0.3 % (ref 0–0.5)
LDLC SERPL CALC-MCNC: 63 MG/DL (ref 0–100)
LDLC/HDLC SERPL: 1.9 {RATIO}
LYMPHOCYTES # BLD AUTO: 1.53 10*3/MM3 (ref 0.7–3.1)
LYMPHOCYTES NFR BLD AUTO: 24.2 % (ref 19.6–45.3)
MCH RBC QN AUTO: 28.8 PG (ref 26.6–33)
MCHC RBC AUTO-ENTMCNC: 32.8 G/DL (ref 31.5–35.7)
MCV RBC AUTO: 87.9 FL (ref 79–97)
MONOCYTES # BLD AUTO: 0.43 10*3/MM3 (ref 0.1–0.9)
MONOCYTES NFR BLD AUTO: 6.8 % (ref 5–12)
NEUTROPHILS NFR BLD AUTO: 3.95 10*3/MM3 (ref 1.7–7)
NEUTROPHILS NFR BLD AUTO: 62.6 % (ref 42.7–76)
NRBC BLD AUTO-RTO: 0 /100 WBC (ref 0–0.2)
PLATELET # BLD AUTO: 202 10*3/MM3 (ref 140–450)
PMV BLD AUTO: 12 FL (ref 6–12)
POTASSIUM SERPL-SCNC: 4.2 MMOL/L (ref 3.5–5.2)
PROT ?TM UR-MCNC: 11.2 MG/DL
PROT SERPL-MCNC: 6.8 G/DL (ref 6–8.5)
RBC # BLD AUTO: 4.86 10*6/MM3 (ref 4.14–5.8)
SODIUM SERPL-SCNC: 140 MMOL/L (ref 136–145)
TRIGL SERPL-MCNC: 82 MG/DL (ref 0–150)
VLDLC SERPL-MCNC: 16 MG/DL (ref 5–40)
WBC NRBC COR # BLD: 6.31 10*3/MM3 (ref 3.4–10.8)

## 2022-08-26 PROCEDURE — 80061 LIPID PANEL: CPT

## 2022-08-26 PROCEDURE — 85025 COMPLETE CBC W/AUTO DIFF WBC: CPT

## 2022-08-26 PROCEDURE — 36415 COLL VENOUS BLD VENIPUNCTURE: CPT

## 2022-08-26 PROCEDURE — 80053 COMPREHEN METABOLIC PANEL: CPT

## 2022-08-26 PROCEDURE — 87086 URINE CULTURE/COLONY COUNT: CPT

## 2022-08-26 PROCEDURE — 84156 ASSAY OF PROTEIN URINE: CPT

## 2022-08-26 PROCEDURE — 83036 HEMOGLOBIN GLYCOSYLATED A1C: CPT

## 2022-08-26 PROCEDURE — 82306 VITAMIN D 25 HYDROXY: CPT

## 2022-08-27 LAB — BACTERIA SPEC AEROBE CULT: ABNORMAL

## 2023-01-26 ENCOUNTER — OFFICE VISIT (OUTPATIENT)
Dept: CARDIOLOGY | Facility: CLINIC | Age: 78
End: 2023-01-26
Payer: MEDICARE

## 2023-01-26 VITALS
SYSTOLIC BLOOD PRESSURE: 149 MMHG | DIASTOLIC BLOOD PRESSURE: 73 MMHG | HEIGHT: 71 IN | HEART RATE: 65 BPM | WEIGHT: 208.8 LBS | BODY MASS INDEX: 29.23 KG/M2

## 2023-01-26 DIAGNOSIS — I10 PRIMARY HYPERTENSION: Primary | Chronic | ICD-10-CM

## 2023-01-26 DIAGNOSIS — I25.10 CORONARY ARTERY DISEASE INVOLVING NATIVE CORONARY ARTERY OF NATIVE HEART WITHOUT ANGINA PECTORIS: ICD-10-CM

## 2023-01-26 DIAGNOSIS — E78.2 MIXED HYPERLIPIDEMIA: ICD-10-CM

## 2023-01-26 PROCEDURE — 99214 OFFICE O/P EST MOD 30 MIN: CPT | Performed by: INTERNAL MEDICINE

## 2023-01-26 RX ORDER — ASPIRIN 81 MG/1
81 TABLET, CHEWABLE ORAL DAILY
COMMUNITY

## 2023-01-26 NOTE — PROGRESS NOTES
Chief Complaint  Hypertension, Dizziness, and Coronary Artery Disease    Subjective        Robin Karimi presents to [facility] CARDIOLOGY  History of present illness:    Patient states since he saw us last [facility] and [facility] went from 25 mg twice a day of the metoprolol up to 100 mg twice a day.  Since that time he has noted dizziness.  He states he watches his blood pressure every day and it runs under good control.  His last hemoglobin A1c was 6.9.  He is taking the Crestor every day.  He notes no exertional chest pain, palpitations or edema.  He does remain active.      Past Medical History:   Diagnosis Date   • Asthma    • Cancer (HCC)     MELANOMA REMOVED FROM NOSE AND LT SHOULDER   • Carpal tunnel syndrome     LT HAND   • Coronary artery disease    • Diabetes mellitus (HCC)     TYPE 2   • Diverticulosis of colon    • Factor 5 Leiden mutation, heterozygous (HCC)    • GERD (gastroesophageal reflux disease)    • H/O cornea transplant    • Hip pain, chronic, left    • History of melanoma excision     LT SHOULDER AND NOSE   • History of skin cancer    • History of stomach ulcers    • Hypertension    • IBS (irritable bowel syndrome)    • Infectious viral hepatitis     CHILDHOOD   • Joint pain    • Neuropathy     IN FEET AND HANDS   • Numbness or tingling     LEFT HAND   • PONV (postoperative nausea and vomiting)    • Right knee pain    • Ringing in ears    • Sleep apnea     CPAP   • Stiffness in joint          Past Surgical History:   Procedure Laterality Date   • CARDIAC CATHETERIZATION      X2   • CARPAL TUNNEL RELEASE Left 4/20/2021    Procedure: CARPAL TUNNEL RELEASE;  Surgeon: Ren March MD;  Location: [facility] OR [facility];  Service: Orthopedics;  Laterality: Left;   • CHOLECYSTECTOMY     • COLONOSCOPY     • COLONOSCOPY N/A 8/17/2022    Procedure: COLONOSCOPY WITH POLYPECTOMY;  Surgeon: Ozzy Hudson MD;  Location: [facility] ENDOSCOPY;  Service: Gastroenterology;   Laterality: N/A;  COLON POLYP, DIVERTICULOSIS   • CORNEAL TRANSPLANT      GARETT   • CORONARY ANGIOPLASTY  2015    and 2011   • ENDOSCOPY N/A 8/17/2022    Procedure: ESOPHAGOGASTRODUODENOSCOPY WITH POLYPECTOMY, ENDO CLIP APPLICATION X1. BIOPSY;  Surgeon: Ozzy Hudson MD;  Location: [facility] ENDOSCOPY;  Service: Gastroenterology;  Laterality: N/A;  DUODENUM POLYP, HIATAL HERNIA   • EXCISION LESION Left     LT SHOULDER/  MELANOMA REMOVED   • KNEE ARTHROSCOPY Right 3/20/2018    Procedure: RIGHT KNEE ARTHROSCOPY, PARTIAL MEDIAL MENISECTOMY, ABRASSSION CHONDROPLASTY, AND LOOSE BODY REMOVAL;  Surgeon: Logan Roblero MD;  Location: [facility] OR [facility];  Service: Orthopedics   • KNEE SURGERY Right 06/23/2015    SCOPE   • NASAL MASS EXCISION  2016    MELANOMA   • ROTATOR CUFF REPAIR Left 2016   • TOTAL KNEE ARTHROPLASTY Left 2/12/2019    Procedure: TOTAL KNEE ARTHROPLASTY;  Surgeon: Seth Floyd MD;  Location: [facility] OR;  Service: Orthopedics   • TOTAL KNEE ARTHROPLASTY Right 6/14/2021    Procedure: TOTAL KNEE ARTHROPLASTY;  Surgeon: Seth Floyd MD;  Location: [facility] OR;  Service: Orthopedics;  Laterality: Right;          Social History     Socioeconomic History   • Marital status:    Tobacco Use   • Smoking status: Never   • Smokeless tobacco: Never   Vaping Use   • Vaping Use: Never used   Substance and Sexual Activity   • Alcohol use: No   • Drug use: No   • Sexual activity: Defer         Family History   Problem Relation Age of Onset   • Stomach cancer Other    • Diabetes Other    • Heart disease Other    • Hypertension Other    • Malig Hyperthermia Neg Hx    • Colon cancer Neg Hx           Allergies   Allergen Reactions   • Benazepril-Hydrochlorothiazide Cough   • Zocor [Simvastatin] Other (See Comments)     MUSCLE PAIN            Current Outpatient Medications:   •  acetaminophen (TYLENOL) 650 MG 8 hr tablet, Take 650 mg by mouth Every 8 (Eight) Hours As Needed., Disp: , Rfl:   •   "amLODIPine (NORVASC) 10 MG tablet, Take 1 tablet by mouth Daily., Disp: 90 tablet, Rfl: 3  •  aspirin 81 MG chewable tablet, Chew 81 mg Daily., Disp: , Rfl:   •  cetirizine (ZyrTEC) 10 MG tablet, Take 10 mg by mouth Daily As Needed., Disp: , Rfl:   •  docusate sodium (COLACE) 100 MG capsule, Take 1 capsule by mouth 2 (Two) Times a Day. (Patient taking differently: Take 100 mg by mouth Daily As Needed.), Disp: 60 capsule, Rfl: 0  •  hydrocortisone 2.5 % ointment, APPLY A THIN LAYER TO THE AFFECTED AREA (NOSE) TWICE A DAY AS NEEDED, Disp: , Rfl:   •  Insulin Detemir (LEVEMIR FLEXPEN SC), Inject 38 Units under the skin into the appropriate area as directed Every Evening., Disp: , Rfl:   •  losartan-hydrochlorothiazide (HYZAAR) 50-12.5 MG per tablet, Take 1 tablet by mouth Every Morning., Disp: 90 tablet, Rfl: 3  •  loteprednol (LOTEMAX) 0.5 % ophthalmic suspension, Administer 1 drop to both eyes Every Morning., Disp: , Rfl:   •  metoprolol tartrate (LOPRESSOR) 50 MG tablet, Take 1 tablet by mouth 2 (Two) Times a Day. (Patient taking differently: Take 100 mg by mouth 2 (Two) Times a Day.), Disp: 180 tablet, Rfl: 3  •  nitroglycerin (NITROSTAT) 0.4 MG SL tablet, Place 0.4 mg under the tongue Every 5 (Five) Minutes As Needed for Chest Pain. Take no more than 3 doses in 15 minutes., Disp: , Rfl:   •  pantoprazole (PROTONIX) 40 MG EC tablet, Take 40 mg by mouth Every Morning., Disp: , Rfl: 1  •  RELION PEN NEEDLE 31G/8MM 31G X 8 MM misc, USE 1 ONCE DAILY WITH INSULIN INJECTION, Disp: , Rfl:   No current facility-administered medications for this visit.    Facility-Administered Medications Ordered in Other Visits:   •  Chlorhexidine Gluconate 2 % pads 2 each, 2 pad, Apply externally, BID, Seth Floyd MD      ROS:  Cardiac review of systems positive for dizziness    Objective     /73   Pulse 65   Ht 180.3 cm (71\")   Wt 94.7 kg (208 lb 12.8 oz)   BMI 29.12 kg/m²       General Appearance:   · well developed  · well " nourished  HENT:   · oropharynx moist  · lips not cyanotic  Respiratory:  · no respiratory distress  · normal breath sounds  · no rales  Cardiovascular:  · no jugular venous distention  · regular rhythm  · S1 normal, S2 normal  · no S3, no S4   · no murmur  · no rub, no thrill  · No carotid bruit  · pedal pulses normal  · lower extremity edema: none    Musculoskeletal:  · no clubbing of fingers.   · normocephalic, head atraumatic  Skin:   · warm, dry  Psychiatric:  · judgement and insight appropriate  · normal mood and affect    ECHO:    STRESS:    CATH:  No results found for this or any previous visit.    BMP:   Glucose   Date Value Ref Range Status   08/26/2022 103 (H) 65 - 99 mg/dL Final     BUN   Date Value Ref Range Status   08/26/2022 24 (H) 8 - 23 mg/dL Final     Creatinine   Date Value Ref Range Status   08/26/2022 1.24 0.76 - 1.27 mg/dL Final     Sodium   Date Value Ref Range Status   08/26/2022 140 136 - 145 mmol/L Final     Potassium   Date Value Ref Range Status   08/26/2022 4.2 3.5 - 5.2 mmol/L Final     Chloride   Date Value Ref Range Status   08/26/2022 101 98 - 107 mmol/L Final     CO2   Date Value Ref Range Status   08/26/2022 28.0 22.0 - 29.0 mmol/L Final     Calcium   Date Value Ref Range Status   08/26/2022 9.8 8.6 - 10.5 mg/dL Final     BUN/Creatinine Ratio   Date Value Ref Range Status   08/26/2022 19.4 7.0 - 25.0 Final     Anion Gap   Date Value Ref Range Status   08/26/2022 11.0 5.0 - 15.0 mmol/L Final     eGFR   Date Value Ref Range Status   08/26/2022 60.6 >60.0 mL/min/1.73 Final     Comment:     National Kidney Foundation and American Society of Nephrology (ASN) Task Force recommended calculation based on the Chronic Kidney Disease Epidemiology Collaboration (CKD-EPI) equation refit without adjustment for race.     LIPIDS:  Total Cholesterol   Date Value Ref Range Status   08/26/2022 112 0 - 200 mg/dL Final     Triglycerides   Date Value Ref Range Status   08/26/2022 82 0 - 150 mg/dL Final      HDL Cholesterol   Date Value Ref Range Status   08/26/2022 33 (L) 40 - 60 mg/dL Final     LDL Cholesterol    Date Value Ref Range Status   08/26/2022 63 0 - 100 mg/dL Final     VLDL Cholesterol   Date Value Ref Range Status   08/26/2022 16 5 - 40 mg/dL Final     LDL/HDL Ratio   Date Value Ref Range Status   08/26/2022 1.90  Final         Procedures             ASSESSMENT:  Encounter Diagnoses   Name Primary?   • Primary hypertension Yes   • Mixed hyperlipidemia    • Coronary artery disease involving native coronary artery of native heart without angina pectoris          PLAN:    1.  Asked the patient to cut back down to his metoprolol 25 mg twice daily as he has noted dizziness with going up on this dose.  He brings in multiple blood pressure readings that are under excellent control.  2.  Continue the Crestor.  Patient had cholesterol checked 8/26/2022 which showed an LDL of 63, HDL of 33, and triglycerides 82.  These are under good control.  3.  Continue the aspirin.  The patient notes no problems with bleeding.  4.  Encourage the patient to continue to remain active and call us if any exertional cardiac complaints.  Overall appears to be doing very well from a heart standpoint.          Patient was given instructions and counseling regarding his condition or for health maintenance advice. Please see specific information pulled into the AVS if appropriate.         Ren Reese MD   1/26/2023  14:26 EST

## 2023-04-10 ENCOUNTER — TRANSCRIBE ORDERS (OUTPATIENT)
Dept: LAB | Facility: HOSPITAL | Age: 78
End: 2023-04-10
Payer: MEDICARE

## 2023-04-10 ENCOUNTER — LAB (OUTPATIENT)
Dept: LAB | Facility: HOSPITAL | Age: 78
End: 2023-04-10
Payer: MEDICARE

## 2023-04-10 DIAGNOSIS — N18.32 CHRONIC KIDNEY DISEASE (CKD) STAGE G3B/A1, MODERATELY DECREASED GLOMERULAR FILTRATION RATE (GFR) BETWEEN 30-44 ML/MIN/1.73 SQUARE METER AND ALBUMINURIA CREATININE RATIO LESS THAN 30 MG/G (CMS/H*: ICD-10-CM

## 2023-04-10 DIAGNOSIS — N18.32 CHRONIC KIDNEY DISEASE (CKD) STAGE G3B/A1, MODERATELY DECREASED GLOMERULAR FILTRATION RATE (GFR) BETWEEN 30-44 ML/MIN/1.73 SQUARE METER AND ALBUMINURIA CREATININE RATIO LESS THAN 30 MG/G (CMS/H*: Primary | ICD-10-CM

## 2023-04-10 LAB
ALBUMIN SERPL-MCNC: 4.5 G/DL (ref 3.5–5.2)
ANION GAP SERPL CALCULATED.3IONS-SCNC: 11.3 MMOL/L (ref 5–15)
BASOPHILS # BLD AUTO: 0.08 10*3/MM3 (ref 0–0.2)
BASOPHILS NFR BLD AUTO: 1 % (ref 0–1.5)
BILIRUB UR QL STRIP: NEGATIVE
BUN SERPL-MCNC: 28 MG/DL (ref 8–23)
BUN/CREAT SERPL: 19.6 (ref 7–25)
CALCIUM SPEC-SCNC: 10 MG/DL (ref 8.6–10.5)
CHLORIDE SERPL-SCNC: 102 MMOL/L (ref 98–107)
CLARITY UR: CLEAR
CO2 SERPL-SCNC: 24.7 MMOL/L (ref 22–29)
COLOR UR: YELLOW
CREAT SERPL-MCNC: 1.43 MG/DL (ref 0.76–1.27)
CREAT UR-MCNC: 44.6 MG/DL
DEPRECATED RDW RBC AUTO: 40.6 FL (ref 37–54)
EGFRCR SERPLBLD CKD-EPI 2021: 51.1 ML/MIN/1.73
EOSINOPHIL # BLD AUTO: 0.4 10*3/MM3 (ref 0–0.4)
EOSINOPHIL NFR BLD AUTO: 5 % (ref 0.3–6.2)
ERYTHROCYTE [DISTWIDTH] IN BLOOD BY AUTOMATED COUNT: 12.7 % (ref 12.3–15.4)
GLUCOSE SERPL-MCNC: 104 MG/DL (ref 65–99)
GLUCOSE UR STRIP-MCNC: NEGATIVE MG/DL
HCT VFR BLD AUTO: 39.7 % (ref 37.5–51)
HGB BLD-MCNC: 13.5 G/DL (ref 13–17.7)
HGB UR QL STRIP.AUTO: NEGATIVE
IMM GRANULOCYTES # BLD AUTO: 0.02 10*3/MM3 (ref 0–0.05)
IMM GRANULOCYTES NFR BLD AUTO: 0.2 % (ref 0–0.5)
KETONES UR QL STRIP: NEGATIVE
LEUKOCYTE ESTERASE UR QL STRIP.AUTO: NEGATIVE
LYMPHOCYTES # BLD AUTO: 1.45 10*3/MM3 (ref 0.7–3.1)
LYMPHOCYTES NFR BLD AUTO: 18.1 % (ref 19.6–45.3)
MCH RBC QN AUTO: 30.1 PG (ref 26.6–33)
MCHC RBC AUTO-ENTMCNC: 34 G/DL (ref 31.5–35.7)
MCV RBC AUTO: 88.4 FL (ref 79–97)
MONOCYTES # BLD AUTO: 0.58 10*3/MM3 (ref 0.1–0.9)
MONOCYTES NFR BLD AUTO: 7.2 % (ref 5–12)
NEUTROPHILS NFR BLD AUTO: 5.5 10*3/MM3 (ref 1.7–7)
NEUTROPHILS NFR BLD AUTO: 68.5 % (ref 42.7–76)
NITRITE UR QL STRIP: NEGATIVE
NRBC BLD AUTO-RTO: 0 /100 WBC (ref 0–0.2)
PH UR STRIP.AUTO: 6 [PH] (ref 5–8)
PHOSPHATE SERPL-MCNC: 3.7 MG/DL (ref 2.5–4.5)
PLATELET # BLD AUTO: 175 10*3/MM3 (ref 140–450)
PMV BLD AUTO: 12.3 FL (ref 6–12)
POTASSIUM SERPL-SCNC: 4.1 MMOL/L (ref 3.5–5.2)
PROT ?TM UR-MCNC: 9.3 MG/DL
PROT UR QL STRIP: NEGATIVE
PROT/CREAT UR: 0.21 MG/G{CREAT}
RBC # BLD AUTO: 4.49 10*6/MM3 (ref 4.14–5.8)
SODIUM SERPL-SCNC: 138 MMOL/L (ref 136–145)
SP GR UR STRIP: 1.01 (ref 1–1.03)
UROBILINOGEN UR QL STRIP: NORMAL
WBC NRBC COR # BLD: 8.03 10*3/MM3 (ref 3.4–10.8)

## 2023-04-10 PROCEDURE — 84156 ASSAY OF PROTEIN URINE: CPT

## 2023-04-10 PROCEDURE — 80069 RENAL FUNCTION PANEL: CPT

## 2023-04-10 PROCEDURE — 85025 COMPLETE CBC W/AUTO DIFF WBC: CPT

## 2023-04-10 PROCEDURE — 36415 COLL VENOUS BLD VENIPUNCTURE: CPT

## 2023-04-10 PROCEDURE — 81003 URINALYSIS AUTO W/O SCOPE: CPT

## 2023-04-10 PROCEDURE — 82570 ASSAY OF URINE CREATININE: CPT

## 2023-05-10 ENCOUNTER — TRANSCRIBE ORDERS (OUTPATIENT)
Dept: ADMINISTRATIVE | Facility: HOSPITAL | Age: 78
End: 2023-05-10
Payer: MEDICARE

## 2023-05-11 ENCOUNTER — TRANSCRIBE ORDERS (OUTPATIENT)
Dept: ADMINISTRATIVE | Facility: HOSPITAL | Age: 78
End: 2023-05-11
Payer: MEDICARE

## 2023-05-11 DIAGNOSIS — R10.9 LEFT FLANK PAIN: ICD-10-CM

## 2023-05-11 DIAGNOSIS — E11.9 DIABETES MELLITUS TYPE 2 IN NONOBESE: ICD-10-CM

## 2023-05-11 DIAGNOSIS — N23 KIDNEY PAIN: Primary | ICD-10-CM

## 2023-06-08 ENCOUNTER — OFFICE VISIT (OUTPATIENT)
Dept: GASTROENTEROLOGY | Facility: CLINIC | Age: 78
End: 2023-06-08
Payer: MEDICARE

## 2023-06-08 VITALS
BODY MASS INDEX: 28.87 KG/M2 | DIASTOLIC BLOOD PRESSURE: 59 MMHG | SYSTOLIC BLOOD PRESSURE: 127 MMHG | HEART RATE: 56 BPM | HEIGHT: 71 IN | WEIGHT: 206.2 LBS

## 2023-06-08 DIAGNOSIS — K59.04 CHRONIC IDIOPATHIC CONSTIPATION: ICD-10-CM

## 2023-06-08 DIAGNOSIS — K21.9 GASTROESOPHAGEAL REFLUX DISEASE, UNSPECIFIED WHETHER ESOPHAGITIS PRESENT: Primary | ICD-10-CM

## 2023-06-08 DIAGNOSIS — R12 HEARTBURN: ICD-10-CM

## 2023-06-08 RX ORDER — FAMOTIDINE 20 MG/1
20 TABLET, FILM COATED ORAL 2 TIMES DAILY
COMMUNITY
End: 2023-06-08 | Stop reason: SDUPTHER

## 2023-06-08 RX ORDER — BACILLUS COAGULANS/INULIN 1B-250 MG
CAPSULE ORAL SEE ADMIN INSTRUCTIONS
COMMUNITY

## 2023-06-08 RX ORDER — GLIPIZIDE 10 MG/1
TABLET ORAL
COMMUNITY
Start: 2022-11-14

## 2023-06-08 RX ORDER — ROSUVASTATIN CALCIUM 20 MG/1
20 TABLET, COATED ORAL DAILY
COMMUNITY

## 2023-06-08 RX ORDER — FAMOTIDINE 20 MG/1
20 TABLET, FILM COATED ORAL 2 TIMES DAILY
Qty: 60 TABLET | Refills: 3 | Status: SHIPPED | OUTPATIENT
Start: 2023-06-08

## 2023-06-08 NOTE — PROGRESS NOTES
Patient Name: Robin Karimi   Visit Date: 06/08/2023   Patient ID: [id number]  Provider: BUFFY Vargas    Sex: male  Location:  Location Address:  Location Phone: [phone]  [place] [address] [phone]    YOB: 1945  Age: 78 y.o.   Primary Care Provider Yair Lyle MD      Referring Provider: No ref. provider found        Chief Complaint  Heartburn (Daily, worse in the evenings ) and Constipation (Pt states having a Bm every 3 days, straining some. Taking Colace daily )    History of Present Illness  Pt was set up through direct access for scopes:  EGD colonoscopy 8/17/2022: Small hiatal hernia, irregular Z-line-GE junction with reflux Esophagitis otherwise negative, small polyp in the duodenal bulb-mild Brunner's gland hyperplasia, suggestive of gastric metaplasia; good bowel prep, diverticula in the colon, small polyp in the ascending colon completely removed-adenomatous, repeat colonoscopy 5 years    Pt states nephrology concerned about pt taking PPI , pt with stage II - III CKD,  he weaned off over 1 month, but HB has increased. He is taking Pepcid PRN - recently has taken about 3-4 x week and this has worked  Pt has noticed Crestor worsens HB - he is cutting 20mg in 1/2  Pt eats his last meal at 4PM  No abd pain and no dysphagia    Pt has been a little more constipated the last 4-5 months, BM every 3 days, and some straining, [place] #4-5 no blood in stool   Colace 100 mg BID    Past Medical History:   Diagnosis Date    Asthma     Cancer     MELANOMA REMOVED FROM NOSE AND LT SHOULDER    Carpal tunnel syndrome     LT HAND    Coronary artery disease     Diabetes mellitus     TYPE 2    Diverticulosis of colon     Factor 5 Leiden mutation, heterozygous     GERD (gastroesophageal reflux disease)     H/O cornea transplant     Hip pain, chronic, left     History of melanoma excision     LT SHOULDER AND NOSE    History of skin cancer     History of stomach ulcers      Hypertension     IBS (irritable bowel syndrome)     Infectious viral hepatitis     CHILDHOOD    Joint pain     Neuropathy     IN FEET AND HANDS    Numbness or tingling     LEFT HAND    PONV (postoperative nausea and vomiting)     Right knee pain     Ringing in ears     Sleep apnea     CPAP    Stiffness in joint        Past Surgical History:   Procedure Laterality Date    CARDIAC CATHETERIZATION      X2    CARPAL TUNNEL RELEASE Left 4/20/2021    Procedure: CARPAL TUNNEL RELEASE;  Surgeon: Ren March MD;  Location: [facility];  Service: Orthopedics;  Laterality: Left;    CHOLECYSTECTOMY      COLONOSCOPY      COLONOSCOPY N/A 8/17/2022    Procedure: COLONOSCOPY WITH POLYPECTOMY;  Surgeon: Ozzy Hudson MD;  [facility];  Service: Gastroenterology;  Laterality: N/A;  COLON POLYP, DIVERTICULOSIS    CORNEAL TRANSPLANT      GARETT    CORONARY ANGIOPLASTY  2015    and 2011    ENDOSCOPY N/A 8/17/2022    Procedure: ESOPHAGOGASTRODUODENOSCOPY WITH POLYPECTOMY, ENDO CLIP APPLICATION X1. BIOPSY;  Surgeon: Ozzy Hudson MD;  [facility] END[facility];  Service: Gastroenterology;  Laterality: N/A;  DUODENUM POLYP, HIATAL HERNIA    EXCISION LESION Left     LT SHOULDER/  MELANOMA REMOVED    KNEE ARTHROSCOPY Right 3/20/2018    Procedure: RIGHT KNEE ARTHROSCOPY, PARTIAL MEDIAL MENISECTOMY, ABRASSSION CHONDROPLASTY, AND LOOSE BODY REMOVAL;  Surgeon: Logan Roblero MD;  Location: [facility];  Service: Orthopedics    KNEE SURGERY Right 06/23/2015    SCOPE    NASAL MASS EXCISION  2016    MELANOMA    ROTATOR CUFF REPAIR Left 2016    TOTAL KNEE ARTHROPLASTY Left 2/12/2019    Procedure: TOTAL KNEE ARTHROPLASTY;  Surgeon: Seth Floyd MD;  Location: [facility] MAIN OR;  Service: Orthopedics    TOTAL KNEE ARTHROPLASTY Right 6/14/2021    Procedure: TOTAL KNEE ARTHROPLASTY;  Surgeon: Seth Floyd MD;  Location: [facility] MAIN OR;  Service: Orthopedics;  Laterality: Right;       Allergies   Allergen  "Reactions    Benazepril-Hydrochlorothiazide Cough    Zocor [Simvastatin] Other (See Comments)     MUSCLE PAIN       Family History   Problem Relation Age of Onset    Stomach cancer Other     Diabetes Other     Heart disease Other     Hypertension Other     Malig Hyperthermia Neg Hx     Colon cancer Neg Hx         Social History     Tobacco Use    Smoking status: Never    Smokeless tobacco: Never   Vaping Use    Vaping Use: Never used   Substance Use Topics    Alcohol use: No    Drug use: No       Objective     Vital Signs:   /59 (BP Location: Right arm, Patient Position: Sitting, Cuff Size: Adult)   Pulse 56   Ht 180.3 cm (71\")   Wt 93.5 kg (206 lb 3.2 oz)   BMI 28.76 kg/m²       Physical Exam  Constitutional:       General: The patient is not in acute distress.     Appearance: Normal appearance.   HENT:      Head: Normocephalic and atraumatic.      Nose: Nose normal.   Pulmonary:      Effort: Pulmonary effort is normal. No respiratory distress.   Abdominal:      General: Abdomen is flat.      Palpations: Abdomen is soft. There is no mass.      Tenderness: There is no abdominal tenderness. There is no guarding.   Musculoskeletal:      Cervical back: Neck supple.      Right lower leg: No edema.      Left lower leg: No edema.   Skin:     General: Skin is warm and dry.   Neurological:      General: No focal deficit present.      Mental Status: The patient is alert and oriented to person, place, and time.      Gait: Gait normal.   Psychiatric:         Mood and Affect: Mood normal.         Speech: Speech normal.         Behavior: Behavior normal.         Thought Content: Thought content normal.     Result Review :   The following data was reviewed by: BUFFY Vargas on 06/08/2023:                    Assessment and Plan    Diagnoses and all orders for this visit:    1. Gastroesophageal reflux disease, unspecified whether esophagitis present (Primary)    2. Heartburn    3. Chronic idiopathic " constipation    Other orders  -     famotidine (PEPCID) 20 MG tablet; Take 1 tablet by mouth 2 (Two) Times a Day.  Dispense: 60 tablet; Refill: 3              Follow Up   Return in about 3 months (around 9/8/2023).  Pt working on wt loss--encourage this as this will only help reflux.  Encouraged smaller meal size, more often, do not eat 3 hrs before bedtime  Encouraged the use of Pepcid first, may take as needed, or may take daily to twice a day if needed.  Discussed the use of Protonix 20 mg versus 40 mg may be 2 or 3 times a week if Pepcid is ineffective.  He will call if Pepcid is not working well enough.  We discussed today the long-term risk of PPI therapy and that the risk of CKD with PPI is unknown, there is an association noted but direct cause and effect is unknown.    Patient was given instructions and counseling regarding his condition or for health maintenance advice. Please see specific information pulled into the AVS if appropriate.

## 2023-07-13 PROBLEM — R80.9 PROTEINURIA: Chronic | Status: ACTIVE | Noted: 2022-02-03

## 2023-07-13 PROBLEM — E10.9 TYPE 1 DIABETES MELLITUS: Status: ACTIVE | Noted: 2023-07-13

## 2023-07-13 PROBLEM — K21.9 GASTROESOPHAGEAL REFLUX DISEASE: Status: ACTIVE | Noted: 2022-02-03

## 2023-07-13 PROBLEM — J30.2 SEASONAL ALLERGIES: Status: ACTIVE | Noted: 2023-07-13

## 2023-07-13 PROBLEM — N18.32 STAGE 3B CHRONIC KIDNEY DISEASE: Status: ACTIVE | Noted: 2022-04-15

## 2023-07-13 PROBLEM — K22.10 ULCER, ESOPHAGUS: Status: ACTIVE | Noted: 2023-07-13

## 2023-07-13 PROBLEM — K75.9 HEPATITIS: Status: ACTIVE | Noted: 2023-07-13

## 2023-07-13 PROBLEM — E11.9 DIABETES: Status: ACTIVE | Noted: 2023-07-13

## 2023-07-13 PROBLEM — E11.22 TYPE 2 DIABETES MELLITUS WITH DIABETIC CHRONIC KIDNEY DISEASE: Chronic | Status: ACTIVE | Noted: 2022-04-15

## 2023-07-13 PROBLEM — E55.9 VITAMIN D DEFICIENCY: Status: ACTIVE | Noted: 2023-04-17

## 2023-07-13 PROBLEM — N40.0 BENIGN PROSTATIC HYPERPLASIA: Status: ACTIVE | Noted: 2022-02-03

## 2023-07-13 PROBLEM — K21.9 ACID REFLUX: Status: ACTIVE | Noted: 2023-07-13

## 2023-07-13 PROBLEM — M79.606 LEG PAIN: Status: ACTIVE | Noted: 2023-07-13

## 2023-07-13 PROBLEM — M19.90 ARTHRITIS: Status: ACTIVE | Noted: 2023-07-13

## 2023-07-13 PROBLEM — E78.00 PURE HYPERCHOLESTEROLEMIA: Status: ACTIVE | Noted: 2023-07-13

## 2023-07-13 PROBLEM — G62.9 NEUROPATHY: Status: ACTIVE | Noted: 2023-07-13

## 2023-07-13 PROBLEM — I51.9 HEART DISEASE: Status: ACTIVE | Noted: 2023-07-13

## 2023-07-13 PROBLEM — J45.909 ASTHMA: Status: ACTIVE | Noted: 2023-07-13

## 2023-07-13 PROBLEM — R25.2 MUSCLE CRAMPS: Status: ACTIVE | Noted: 2023-07-13

## 2023-07-13 PROBLEM — E78.5 HYPERLIPEMIA: Status: ACTIVE | Noted: 2023-07-13

## 2023-07-13 PROBLEM — D68.51 HETEROZYGOUS FACTOR V LEIDEN MUTATION: Status: ACTIVE | Noted: 2023-07-13

## 2023-07-13 PROBLEM — J30.2 SEASONAL ALLERGIC RHINITIS: Status: ACTIVE | Noted: 2023-07-13

## 2023-07-27 ENCOUNTER — OFFICE VISIT (OUTPATIENT)
Dept: CARDIOLOGY | Facility: CLINIC | Age: 78
End: 2023-07-27
Payer: MEDICARE

## 2023-07-27 VITALS
DIASTOLIC BLOOD PRESSURE: 74 MMHG | SYSTOLIC BLOOD PRESSURE: 164 MMHG | HEART RATE: 67 BPM | BODY MASS INDEX: 28.98 KG/M2 | HEIGHT: 71 IN | WEIGHT: 207 LBS

## 2023-07-27 DIAGNOSIS — I51.9 HEART DISEASE: ICD-10-CM

## 2023-07-27 DIAGNOSIS — I10 PRIMARY HYPERTENSION: Primary | Chronic | ICD-10-CM

## 2023-07-27 DIAGNOSIS — E78.2 MIXED HYPERLIPIDEMIA: ICD-10-CM

## 2023-07-27 NOTE — PROGRESS NOTES
Chief Complaint  Hypertension    Subjective        Robin Karimi presents to [facility] CARDIOLOGY  History of present illness:    Patient states overall from a heart standpoint he is doing well.  He notes no chest pain.  He notes some bruising but no significant bleeding.  He does bring in multiple blood pressure recordings from home that are under excellent control.  He denies any edema, palpitations or exertional chest pain.  He still remains very active.      Past Medical History:   Diagnosis Date    Asthma     Cancer     MELANOMA REMOVED FROM NOSE AND LT SHOULDER    Carpal tunnel syndrome     LT HAND    Coronary artery disease     Diabetes mellitus     TYPE 2    Diverticulosis of colon     Factor 5 Leiden mutation, heterozygous     GERD (gastroesophageal reflux disease)     H/O cornea transplant     Hip pain, chronic, left     History of melanoma excision     LT SHOULDER AND NOSE    History of skin cancer     History of stomach ulcers     Hypertension     IBS (irritable bowel syndrome)     Infectious viral hepatitis     CHILDHOOD    Joint pain     Neuropathy     IN FEET AND HANDS    Numbness or tingling     LEFT HAND    PONV (postoperative nausea and vomiting)     Right knee pain     Ringing in ears     Sleep apnea     CPAP    Stiffness in joint          Past Surgical History:   Procedure Laterality Date    CARDIAC CATHETERIZATION      X2    CARPAL TUNNEL RELEASE Left 4/20/2021    Procedure: CARPAL TUNNEL RELEASE;  Surgeon: Ren March MD;  Location:  [facility] [place];  Service: Orthopedics;  Laterality: Left;    CHOLECYSTECTOMY      COLONOSCOPY      COLONOSCOPY N/A 8/17/2022    Procedure: COLONOSCOPY WITH POLYPECTOMY;  Surgeon: Ozzy Hudson MD;  Location: [facility] ENDOSCOPY;  Service: Gastroenterology;  Laterality: N/A;  COLON POLYP, DIVERTICULOSIS    CORNEAL TRANSPLANT      GARETT    CORONARY ANGIOPLASTY  2015    and 2011    ENDOSCOPY N/A 8/17/2022    Procedure:  ESOPHAGOGASTRODUODENOSCOPY WITH POLYPECTOMY, ENDO CLIP APPLICATION X1. BIOPSY;  Surgeon: Ozzy Hudson MD;  Location: [facility] ENDOSCOPY;  Service: Gastroenterology;  Laterality: N/A;  DUODENUM POLYP, HIATAL HERNIA    EXCISION LESION Left     LT SHOULDER/  MELANOMA REMOVED    KNEE ARTHROSCOPY Right 3/20/2018    Procedure: RIGHT KNEE ARTHROSCOPY, PARTIAL MEDIAL MENISECTOMY, ABRASSSION CHONDROPLASTY, AND LOOSE BODY REMOVAL;  Surgeon: oLgan Roblero MD;  [facility];  Service: Orthopedics    KNEE SURGERY Right 06/23/2015    SCOPE    NASAL MASS EXCISION  2016    MELANOMA    ROTATOR CUFF REPAIR Left 2016    TOTAL KNEE ARTHROPLASTY Left 2/12/2019    Procedure: TOTAL KNEE ARTHROPLASTY;  Surgeon: Seth Floyd MD;  Location: [facility] MAIN OR;  Service: Orthopedics    TOTAL KNEE ARTHROPLASTY Right 6/14/2021    Procedure: TOTAL KNEE ARTHROPLASTY;  Surgeon: Seth Floyd MD;  Location: [facility] MAIN OR;  Service: Orthopedics;  Laterality: Right;          Social History     Socioeconomic History    Marital status:     Number of children: 5   Tobacco Use    Smoking status: Never    Smokeless tobacco: Never   Vaping Use    Vaping Use: Never used   Substance and Sexual Activity    Alcohol use: No    Drug use: No    Sexual activity: Defer         Family History   Problem Relation Age of Onset    Diabetes Brother     Stomach cancer Other     Diabetes Other     Heart disease Other     Hypertension Other     Malig Hyperthermia Neg Hx     Colon cancer Neg Hx           Allergies   Allergen Reactions    Benazepril-Hydrochlorothiazide Cough    Zocor [Simvastatin] Other (See Comments)     MUSCLE PAIN            Current Outpatient Medications:     acetaminophen (TYLENOL) 650 MG 8 hr tablet, Take 1 tablet by mouth Every 8 (Eight) Hours As Needed., Disp: , Rfl:     amLODIPine (NORVASC) 10 MG tablet, Take 1 tablet by mouth Daily., Disp: 90 tablet, Rfl: 3    aspirin 81 MG chewable tablet, Chew 1 tablet Daily.,  Disp: , Rfl:     Bacillus Coagulans-Inulin (Probiotic) 1-250 BILLION-MG capsule, See Admin Instructions., Disp: , Rfl:     cetirizine (ZyrTEC) 10 MG tablet, Take 1 tablet by mouth Daily As Needed., Disp: , Rfl:     dapagliflozin Propanediol (Farxiga) 10 MG tablet, Take 10 mg by mouth Daily for 180 days., Disp: 90 tablet, Rfl: 1    docusate sodium (COLACE) 100 MG capsule, Take 1 capsule by mouth 2 (Two) Times a Day. (Patient taking differently: Take 1 capsule by mouth Daily As Needed.), Disp: 60 capsule, Rfl: 0    famotidine (PEPCID) 20 MG tablet, Take 1 tablet by mouth 2 (Two) Times a Day., Disp: 60 tablet, Rfl: 3    glipizide (GLUCOTROL) 10 MG tablet, Take 1 tablet by mouth 2 (Two) Times a Day., Disp: , Rfl:     hydrocortisone 2.5 % ointment, , Disp: , Rfl:     Insulin Detemir (LEVEMIR FLEXPEN SC), Inject 52 Units under the skin into the appropriate area as directed Every Evening., Disp: , Rfl:     losartan-hydrochlorothiazide (HYZAAR) 50-12.5 MG per tablet, Take 1 tablet by mouth Every Morning., Disp: 90 tablet, Rfl: 3    loteprednol (LOTEMAX) 0.5 % ophthalmic suspension, Administer 1 drop into the left eye Every Morning., Disp: , Rfl:     metFORMIN (GLUCOPHAGE) 500 MG tablet, Take 1 tablet by mouth 2 (Two) Times a Day With Meals., Disp: , Rfl:     metoprolol tartrate (LOPRESSOR) 25 MG tablet, Take 1 tablet by mouth 2 (Two) Times a Day., Disp: , Rfl:     nitroglycerin (NITROSTAT) 0.4 MG SL tablet, Place 1 tablet under the tongue Every 5 (Five) Minutes As Needed for Chest Pain. Take no more than 3 doses in 15 minutes., Disp: , Rfl:     prednisoLONE acetate (PRED FORTE) 1 % ophthalmic suspension, Administer 1 drop to the right eye 4 (Four) Times a Day., Disp: , Rfl:     RELION PEN NEEDLE 31G/8MM 31G X 8 MM misc, USE 1 ONCE DAILY WITH INSULIN INJECTION, Disp: , Rfl:     rosuvastatin (CRESTOR) 20 MG tablet, Take 1 tablet by mouth Daily., Disp: , Rfl:   No current facility-administered medications for this  "visit.    Facility-Administered Medications Ordered in Other Visits:     Chlorhexidine Gluconate 2 % pads 2 each, 2 pad , Apply externally, BID, Seth Floyd MD      ROS:  Cardiac review of systems negative.    Objective     /74   Pulse 67   Ht 180.3 cm (71\")   Wt 93.9 kg (207 lb)   BMI 28.87 kg/m²       General Appearance:   well developed  well nourished  HENT:   oropharynx moist  lips not cyanotic  Respiratory:  no respiratory distress  normal breath sounds  no rales  Cardiovascular:  no jugular venous distention  regular rhythm  S1 normal, S2 normal  no S3, no S4   no murmur  no rub, no thrill  No carotid bruit  pedal pulses normal  lower extremity edema: none    Musculoskeletal:  no clubbing of fingers.   normocephalic, head atraumatic  Skin:   warm, dry  Psychiatric:  judgement and insight appropriate  normal mood and affect    ECHO:    STRESS:    CATH:  No results found for this or any previous visit.    BMP:     Glucose   Date Value Ref Range Status   04/10/2023 104 (H) 65 - 99 mg/dL Final     BUN   Date Value Ref Range Status   04/10/2023 28 (H) 8 - 23 mg/dL Final     Creatinine   Date Value Ref Range Status   04/10/2023 1.43 (H) 0.76 - 1.27 mg/dL Final     Sodium   Date Value Ref Range Status   04/10/2023 138 136 - 145 mmol/L Final     Potassium   Date Value Ref Range Status   04/10/2023 4.1 3.5 - 5.2 mmol/L Final     Chloride   Date Value Ref Range Status   04/10/2023 102 98 - 107 mmol/L Final     CO2   Date Value Ref Range Status   04/10/2023 24.7 22.0 - 29.0 mmol/L Final     Calcium   Date Value Ref Range Status   04/10/2023 10.0 8.6 - 10.5 mg/dL Final     BUN/Creatinine Ratio   Date Value Ref Range Status   04/10/2023 19.6 7.0 - 25.0 Final     Anion Gap   Date Value Ref Range Status   04/10/2023 11.3 5.0 - 15.0 mmol/L Final     eGFR   Date Value Ref Range Status   04/10/2023 51.1 (L) >60.0 mL/min/1.73 Final     LIPIDS:  Total Cholesterol   Date Value Ref Range Status   08/26/2022 112 0 - " 200 mg/dL Final     Triglycerides   Date Value Ref Range Status   08/26/2022 82 0 - 150 mg/dL Final     HDL Cholesterol   Date Value Ref Range Status   08/26/2022 33 (L) 40 - 60 mg/dL Final     LDL Cholesterol    Date Value Ref Range Status   08/26/2022 63 0 - 100 mg/dL Final     VLDL Cholesterol   Date Value Ref Range Status   08/26/2022 16 5 - 40 mg/dL Final     LDL/HDL Ratio   Date Value Ref Range Status   08/26/2022 1.90  Final         Procedures             ASSESSMENT:  Diagnoses and all orders for this visit:    1. Primary hypertension (Primary)    2. Mixed hyperlipidemia    3. Heart disease         PLAN:    1.  Patient had mild nonobstructive coronary artery disease by left heart cath at [facility] in 2009.  2.  Continue the aspirin.  The patient notes no significant bleeding problems.  3.  Continue the Crestor.  Patient had cholesterol checked 8/26/2022 with LDL 63, HDL 33, and triglycerides 82.  These are under good control.  4.  Patient brings in multiple blood pressure readings as he monitors it every day.  His blood pressure slightly elevated today in clinic but the last 2 times recorded in clinics were perfect and very similar to what he gets at home.  We will just continue to monitor.  5.  Patient's modifiable risk factors are under excellent control.  I did ask him to continue to remain active and call us if any exertional chest pain relieved with rest.      Return in about 6 months (around 1/27/2024).     Patient was given instructions and counseling regarding his condition or for health maintenance advice. Please see specific information pulled into the AVS if appropriate.         Ren Reese MD   7/27/2023  11:58 EDT

## 2023-08-04 ENCOUNTER — TRANSCRIBE ORDERS (OUTPATIENT)
Dept: LAB | Facility: HOSPITAL | Age: 78
End: 2023-08-04
Payer: MEDICARE

## 2023-08-04 ENCOUNTER — LAB (OUTPATIENT)
Dept: LAB | Facility: HOSPITAL | Age: 78
End: 2023-08-04
Payer: MEDICARE

## 2023-08-04 DIAGNOSIS — E55.9 VITAMIN D DEFICIENCY: ICD-10-CM

## 2023-08-04 DIAGNOSIS — E11.9 DIABETES MELLITUS WITHOUT COMPLICATION: Primary | ICD-10-CM

## 2023-08-04 DIAGNOSIS — R39.9 UTI SYMPTOMS: ICD-10-CM

## 2023-08-04 DIAGNOSIS — E11.9 DIABETES MELLITUS WITHOUT COMPLICATION: ICD-10-CM

## 2023-08-04 LAB
25(OH)D3 SERPL-MCNC: 40.4 NG/ML (ref 30–100)
ALBUMIN SERPL-MCNC: 4.6 G/DL (ref 3.5–5.2)
ALBUMIN/GLOB SERPL: 1.8 G/DL
ALP SERPL-CCNC: 55 U/L (ref 39–117)
ALT SERPL W P-5'-P-CCNC: 21 U/L (ref 1–41)
ANION GAP SERPL CALCULATED.3IONS-SCNC: 9.6 MMOL/L (ref 5–15)
AST SERPL-CCNC: 17 U/L (ref 1–40)
BACTERIA UR QL AUTO: NORMAL /HPF
BASOPHILS # BLD AUTO: 0.08 10*3/MM3 (ref 0–0.2)
BASOPHILS NFR BLD AUTO: 1.2 % (ref 0–1.5)
BILIRUB SERPL-MCNC: 1.1 MG/DL (ref 0–1.2)
BILIRUB UR QL STRIP: NEGATIVE
BUN SERPL-MCNC: 26 MG/DL (ref 8–23)
BUN/CREAT SERPL: 21.7 (ref 7–25)
CALCIUM SPEC-SCNC: 9.5 MG/DL (ref 8.6–10.5)
CHLORIDE SERPL-SCNC: 102 MMOL/L (ref 98–107)
CHOLEST SERPL-MCNC: 125 MG/DL (ref 0–200)
CLARITY UR: CLEAR
CO2 SERPL-SCNC: 28.4 MMOL/L (ref 22–29)
COLOR UR: YELLOW
CREAT SERPL-MCNC: 1.2 MG/DL (ref 0.76–1.27)
DEPRECATED RDW RBC AUTO: 39.7 FL (ref 37–54)
EGFRCR SERPLBLD CKD-EPI 2021: 62.7 ML/MIN/1.73
EOSINOPHIL # BLD AUTO: 0.37 10*3/MM3 (ref 0–0.4)
EOSINOPHIL NFR BLD AUTO: 5.4 % (ref 0.3–6.2)
ERYTHROCYTE [DISTWIDTH] IN BLOOD BY AUTOMATED COUNT: 12.7 % (ref 12.3–15.4)
GLOBULIN UR ELPH-MCNC: 2.6 GM/DL
GLUCOSE SERPL-MCNC: 149 MG/DL (ref 65–99)
GLUCOSE UR STRIP-MCNC: NEGATIVE MG/DL
HBA1C MFR BLD: 7.2 % (ref 4.8–5.6)
HCT VFR BLD AUTO: 45 % (ref 37.5–51)
HDLC SERPL-MCNC: 38 MG/DL (ref 40–60)
HGB BLD-MCNC: 15.3 G/DL (ref 13–17.7)
HGB UR QL STRIP.AUTO: NEGATIVE
HYALINE CASTS UR QL AUTO: NORMAL /LPF
IMM GRANULOCYTES # BLD AUTO: 0.02 10*3/MM3 (ref 0–0.05)
IMM GRANULOCYTES NFR BLD AUTO: 0.3 % (ref 0–0.5)
KETONES UR QL STRIP: NEGATIVE
LDLC SERPL CALC-MCNC: 73 MG/DL (ref 0–100)
LDLC/HDLC SERPL: 1.93 {RATIO}
LEUKOCYTE ESTERASE UR QL STRIP.AUTO: NEGATIVE
LYMPHOCYTES # BLD AUTO: 1.37 10*3/MM3 (ref 0.7–3.1)
LYMPHOCYTES NFR BLD AUTO: 20 % (ref 19.6–45.3)
MCH RBC QN AUTO: 29.7 PG (ref 26.6–33)
MCHC RBC AUTO-ENTMCNC: 34 G/DL (ref 31.5–35.7)
MCV RBC AUTO: 87.4 FL (ref 79–97)
MONOCYTES # BLD AUTO: 0.49 10*3/MM3 (ref 0.1–0.9)
MONOCYTES NFR BLD AUTO: 7.2 % (ref 5–12)
NEUTROPHILS NFR BLD AUTO: 4.51 10*3/MM3 (ref 1.7–7)
NEUTROPHILS NFR BLD AUTO: 65.9 % (ref 42.7–76)
NITRITE UR QL STRIP: NEGATIVE
NRBC BLD AUTO-RTO: 0 /100 WBC (ref 0–0.2)
PH UR STRIP.AUTO: 6 [PH] (ref 5–8)
PLATELET # BLD AUTO: 189 10*3/MM3 (ref 140–450)
PMV BLD AUTO: 12 FL (ref 6–12)
POTASSIUM SERPL-SCNC: 4.6 MMOL/L (ref 3.5–5.2)
PROT SERPL-MCNC: 7.2 G/DL (ref 6–8.5)
PROT UR QL STRIP: ABNORMAL
RBC # BLD AUTO: 5.15 10*6/MM3 (ref 4.14–5.8)
RBC # UR STRIP: NORMAL /HPF
REF LAB TEST METHOD: NORMAL
SODIUM SERPL-SCNC: 140 MMOL/L (ref 136–145)
SP GR UR STRIP: 1.02 (ref 1–1.03)
SQUAMOUS #/AREA URNS HPF: NORMAL /HPF
TRIGL SERPL-MCNC: 69 MG/DL (ref 0–150)
UROBILINOGEN UR QL STRIP: ABNORMAL
VLDLC SERPL-MCNC: 14 MG/DL (ref 5–40)
WBC # UR STRIP: NORMAL /HPF
WBC NRBC COR # BLD: 6.84 10*3/MM3 (ref 3.4–10.8)

## 2023-08-04 PROCEDURE — 82306 VITAMIN D 25 HYDROXY: CPT

## 2023-08-04 PROCEDURE — 83036 HEMOGLOBIN GLYCOSYLATED A1C: CPT

## 2023-08-04 PROCEDURE — 80061 LIPID PANEL: CPT

## 2023-08-04 PROCEDURE — 81001 URINALYSIS AUTO W/SCOPE: CPT

## 2023-08-04 PROCEDURE — 85025 COMPLETE CBC W/AUTO DIFF WBC: CPT

## 2023-08-04 PROCEDURE — 80053 COMPREHEN METABOLIC PANEL: CPT

## 2023-08-04 PROCEDURE — 36415 COLL VENOUS BLD VENIPUNCTURE: CPT

## 2024-10-28 ENCOUNTER — OFFICE VISIT (OUTPATIENT)
Dept: ORTHOPEDIC SURGERY | Facility: CLINIC | Age: 79
End: 2024-10-28
Payer: MEDICARE

## 2024-10-28 VITALS — TEMPERATURE: 98.6 F | BODY MASS INDEX: 29.69 KG/M2 | WEIGHT: 212.1 LBS | HEIGHT: 71 IN

## 2024-10-28 DIAGNOSIS — M25.511 CHRONIC RIGHT SHOULDER PAIN: ICD-10-CM

## 2024-10-28 DIAGNOSIS — M19.011 PRIMARY OSTEOARTHRITIS OF RIGHT SHOULDER: ICD-10-CM

## 2024-10-28 DIAGNOSIS — M75.101 TEAR OF RIGHT ROTATOR CUFF, UNSPECIFIED TEAR EXTENT, UNSPECIFIED WHETHER TRAUMATIC: Primary | ICD-10-CM

## 2024-10-28 DIAGNOSIS — G89.29 CHRONIC RIGHT SHOULDER PAIN: ICD-10-CM

## 2024-10-28 PROCEDURE — 99214 OFFICE O/P EST MOD 30 MIN: CPT | Performed by: ORTHOPAEDIC SURGERY

## 2024-10-28 PROCEDURE — 1160F RVW MEDS BY RX/DR IN RCRD: CPT | Performed by: ORTHOPAEDIC SURGERY

## 2024-10-28 PROCEDURE — 73030 X-RAY EXAM OF SHOULDER: CPT | Performed by: ORTHOPAEDIC SURGERY

## 2024-10-28 PROCEDURE — 1159F MED LIST DOCD IN RCRD: CPT | Performed by: ORTHOPAEDIC SURGERY

## 2024-10-28 RX ORDER — LOSARTAN POTASSIUM 50 MG/1
50 TABLET ORAL DAILY
COMMUNITY
Start: 2024-10-14 | End: 2025-10-14

## 2024-10-28 RX ORDER — AMOXICILLIN 500 MG/1
CAPSULE ORAL
COMMUNITY

## 2024-10-28 RX ORDER — AMITRIPTYLINE HYDROCHLORIDE 10 MG/1
10 TABLET ORAL DAILY
COMMUNITY
Start: 2024-10-14 | End: 2025-01-12

## 2024-10-28 NOTE — PROGRESS NOTES
General Exam    Patient: Robin Karimi    YOB: 1945    Medical Record Number: [id number]    Chief Complaints: Right shoulder pain    History of Present Illness:     79 y.o. male patient who presents for evaluation right shoulder pain.  Patient states he has some shoulder issues previously however about a month and a half ago he tripped and fell worsening his symptoms.  Pain is generalized but mainly just with grabbing or holding things.  He is right-hand dominant.  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    Denies any numbness or tingling.  Denies any fevers, cough or shortness of breath.    Allergies:   Allergies   Allergen Reactions    Benazepril-Hydrochlorothiazide Cough    Zocor [Simvastatin] Other (See Comments)     MUSCLE PAIN    Atorvastatin Other (See Comments)    Empagliflozin Other (See Comments)       Home Medications:      Current Outpatient Medications:     acetaminophen (TYLENOL) 650 MG 8 hr tablet, Take 1 tablet by mouth Every 8 (Eight) Hours As Needed., Disp: , Rfl:     amitriptyline (ELAVIL) 10 MG tablet, Take 1 tablet by mouth Daily., Disp: , Rfl:     amLODIPine (NORVASC) 10 MG tablet, Take 1 tablet by mouth Daily., Disp: 90 tablet, Rfl: 3    amoxicillin (AMOXIL) 500 MG capsule, TAKE 4 CAPSULES BY MOUTH 1 HOUR PRIOR TO DENTAL APPOINTMENT, Disp: , Rfl:     aspirin 81 MG chewable tablet, Chew 1 tablet Daily., Disp: , Rfl:     Bacillus Coagulans-Inulin (Probiotic) 1-250 BILLION-MG capsule, See Admin Instructions., Disp: , Rfl:     cetirizine (ZyrTEC) 10 MG tablet, Take 1 tablet by mouth Daily As Needed., Disp: , Rfl:     docusate sodium (COLACE) 100 MG capsule, Take 1 capsule by mouth 2 (Two) Times a Day. (Patient taking differently: Take 1 capsule by mouth Daily As Needed.), Disp: 60 capsule, Rfl: 0    famotidine (PEPCID) 20 MG tablet, Take 1 tablet by mouth 2 (Two) Times a Day., Disp: 60 tablet, Rfl: 3    glipizide (GLUCOTROL) 10 MG tablet, Take 1 tablet by mouth 2 (Two) Times a  Day., Disp: , Rfl:     hydrocortisone 2.5 % ointment, , Disp: , Rfl:     Insulin Detemir (LEVEMIR FLEXPEN SC), Inject 52 Units under the skin into the appropriate area as directed Every Evening., Disp: , Rfl:     losartan (COZAAR) 50 MG tablet, Take 1 tablet by mouth Daily., Disp: , Rfl:     losartan-hydrochlorothiazide (HYZAAR) 50-12.5 MG per tablet, Take 1 tablet by mouth Every Morning., Disp: 90 tablet, Rfl: 3    loteprednol (LOTEMAX) 0.5 % ophthalmic suspension, Administer 1 drop into the left eye Every Morning., Disp: , Rfl:     metFORMIN (GLUCOPHAGE) 500 MG tablet, Take 1 tablet by mouth 2 (Two) Times a Day With Meals., Disp: , Rfl:     metoprolol tartrate (LOPRESSOR) 25 MG tablet, Take 1 tablet by mouth 2 (Two) Times a Day., Disp: , Rfl:     nitroglycerin (NITROSTAT) 0.4 MG SL tablet, Place 1 tablet under the tongue Every 5 (Five) Minutes As Needed for Chest Pain. Take no more than 3 doses in 15 minutes., Disp: , Rfl:     prednisoLONE acetate (PRED FORTE) 1 % ophthalmic suspension, Administer 1 drop to the right eye 4 (Four) Times a Day., Disp: , Rfl:     RELION PEN NEEDLE 31G/8MM 31G X 8 MM misc, USE 1 ONCE DAILY WITH INSULIN INJECTION, Disp: , Rfl:     rosuvastatin (CRESTOR) 20 MG tablet, Take 1 tablet by mouth Daily., Disp: , Rfl:     dapagliflozin Propanediol (Farxiga) 10 MG tablet, Take 10 mg by mouth Daily for 180 days., Disp: 90 tablet, Rfl: 1  No current facility-administered medications for this visit.    Facility-Administered Medications Ordered in Other Visits:     Chlorhexidine Gluconate 2 % pads 2 each, 2 pad , Apply externally, BID, Seth Floyd MD    Past Medical History:   Diagnosis Date    Asthma     Cancer     MELANOMA REMOVED FROM NOSE AND LT SHOULDER    Carpal tunnel syndrome     LT HAND    Coronary artery disease     Diabetes mellitus     TYPE 2    Diverticulosis of colon     Factor 5 Leiden mutation, heterozygous     GERD (gastroesophageal reflux disease)     H/O cornea transplant      Hip pain, chronic, left     History of melanoma excision     LT SHOULDER AND NOSE    History of skin cancer     History of stomach ulcers     Hypertension     IBS (irritable bowel syndrome)     Infectious viral hepatitis     CHILDHOOD    Joint pain     Neuropathy     IN FEET AND HANDS    Numbness or tingling     LEFT HAND    PONV (postoperative nausea and vomiting)     Right knee pain     Ringing in ears     Sleep apnea     CPAP    Stiffness in joint        Past Surgical History:   Procedure Laterality Date    CARDIAC CATHETERIZATION      X2    CARPAL TUNNEL RELEASE Left 4/20/2021    Procedure: CARPAL TUNNEL RELEASE;  Surgeon: Ren March MD;  Location: [facility] [place];  Service: Orthopedics;  Laterality: Left;    CHOLECYSTECTOMY      COLONOSCOPY      COLONOSCOPY N/A 8/17/2022    Procedure: COLONOSCOPY WITH POLYPECTOMY;  Surgeon: Ozzy Hudson MD;  [facility];  Service: Gastroenterology;  Laterality: N/A;  COLON POLYP, DIVERTICULOSIS    CORNEAL TRANSPLANT      GARETT    CORONARY ANGIOPLASTY  2015    and 2011    ENDOSCOPY N/A 8/17/2022    Procedure: ESOPHAGOGASTRODUODENOSCOPY WITH POLYPECTOMY, ENDO CLIP APPLICATION X1. BIOPSY;  Surgeon: Ozzy Hudson MD;  [facility];  Service: Gastroenterology;  Laterality: N/A;  DUODENUM POLYP, HIATAL HERNIA    EXCISION LESION Left     LT SHOULDER/  MELANOMA REMOVED    KNEE ARTHROSCOPY Right 3/20/2018    Procedure: RIGHT KNEE ARTHROSCOPY, PARTIAL MEDIAL MENISECTOMY, ABRASSSION CHONDROPLASTY, AND LOOSE BODY REMOVAL;  Surgeon: Logan Roblero MD;  Location: [facility] [place];  Service: Orthopedics    KNEE SURGERY Right 06/23/2015    SCOPE    NASAL MASS EXCISION  2016    MELANOMA    ROTATOR CUFF REPAIR Left 2016    TOTAL KNEE ARTHROPLASTY Left 2/12/2019    Procedure: TOTAL KNEE ARTHROPLASTY;  Surgeon: Seth Floyd MD;  Location: [facility] OR;  Service: Orthopedics    TOTAL KNEE ARTHROPLASTY Right 6/14/2021    Procedure: TOTAL KNEE  "ARTHROPLASTY;  Surgeon: Seth Floyd MD;  Location: [facility];  Service: Orthopedics;  Laterality: Right;       Social History     Occupational History    Occupation: Retired   Tobacco Use    Smoking status: Never    Smokeless tobacco: Never   Vaping Use    Vaping status: Never Used   Substance and Sexual Activity    Alcohol use: No    Drug use: No    Sexual activity: Defer      Social History     Social History Narrative    Not on file       Family History   Problem Relation Age of Onset    Diabetes Brother     Stomach cancer Other     Diabetes Other     Heart disease Other     Hypertension Other     Malig Hyperthermia Neg Hx     Colon cancer Neg Hx        Review of Systems:      Constitutional: Denies fever, shaking or chills         All other pertinent positives and negatives as noted above in HPI.    Physical Exam: 79 y.o. male    Vitals:    10/28/24 1312   Temp: 98.6 °F (37 °C)   TempSrc: Temporal   Weight: 96.2 kg (212 lb 1.6 oz)   Height: 180.3 cm (71\")       General:  Patient is awake and alert.  Appears in no acute distress or discomfort.      Musculoskeletal/Extremities:    Right upper extremity examined overall range of motion is near full with slight restriction in regards to abduction and forward flexion.  Interment rotator cuff strength some discomfort with Muhammad and Neer's.  Motor and sensory intact distally.         Radiology:       3 views right shoulder AP, scapular Y and axillary taken reviewed to evaluate the patient's complaint/s.    Imaging shows degenerative changes throughout the glenohumeral joint and AC joint.  There is evidence of superior migration of the humeral head in reference to the glenoid.    MRI findings were reviewed and impression shows complete disruption of supraspinatus and infraspinatus tendons.  Full-thickness tearing involving the majority of the subscapularis with some retraction and delamination between the adjacent degenerated elongated transverse humeral " fibers which do maintain continuity.  Mild to severe rotator cuff muscle atrophy.  Evidence of moderate to severe glenohumeral osteoarthritis with superior subluxation of the humeral head abutting the acromion undersurface through the large rotator cuff defect and near circumferential degenerative labral tearing.  Severe biceps long head tendinopathy     No imaging for comparison.    Assessment: Right shoulder rotator cuff arthropathy, osteoarthritis      Plan:      I discussed findings with the patient.  He has  severe degenerative changes consistent with arthritis of the shoulder as well as rotator cuff arthropathy/deficiency.  Told the patient could consider possible conservative treatment assisting of injection therapy however he would like to hold on that as injection the past did really increase his sugars.  I did tell him possible surgical intervention may be warranted due to pain and function limitations in which that would likely consist of a reverse total shoulder replacement.  All this was briefly discussed with the patient I told him for continued formal discussion I will recommend referral to my shoulder partner Dr. March.  Patient was okay with this plan.    We will plan for follow up as above.    All questions were answered.  Patient understands and agrees with the plan.    Robin Miller MD    10/28/2024    CC to Tosha Gonzalez MD       Answers submitted by the patient for this visit:  Other (Submitted on 10/11/2024)  Please describe your symptoms.: Fell & hurt my shoulder  Have you had these symptoms before?: No  How long have you been having these symptoms?: Greater than 2 weeks  Please list any medications you are currently taking for this condition.: Tylenol  Please describe any probable cause for these symptoms. : I fell on my shoulder  Primary Reason for Visit (Submitted on 10/11/2024)  What is the primary reason for your visit?: Problem Not Listed

## 2024-12-13 ENCOUNTER — OFFICE VISIT (OUTPATIENT)
Dept: ORTHOPEDIC SURGERY | Facility: CLINIC | Age: 79
End: 2024-12-13
Payer: MEDICARE

## 2024-12-13 VITALS — WEIGHT: 215.1 LBS | BODY MASS INDEX: 30.11 KG/M2 | TEMPERATURE: 97.8 F | HEIGHT: 71 IN

## 2024-12-13 DIAGNOSIS — M19.019 ARTHRITIS OF SHOULDER: Primary | ICD-10-CM

## 2024-12-13 RX ORDER — METOPROLOL TARTRATE 50 MG
50 TABLET ORAL
COMMUNITY
Start: 2024-12-05

## 2024-12-13 RX ORDER — PANTOPRAZOLE SODIUM 40 MG/1
40 TABLET, DELAYED RELEASE ORAL DAILY
COMMUNITY
Start: 2024-12-05

## 2024-12-13 RX ORDER — INSULIN GLARGINE-YFGN 100 [IU]/ML
INJECTION, SOLUTION SUBCUTANEOUS
COMMUNITY
Start: 2024-09-12

## 2024-12-13 NOTE — PROGRESS NOTES
Patient: Robin Karimi    YOB: 1945    Medical Record Number: [id number]    Chief Complaints:  Referral for right shoulder pain    History of Present Illness:     79 y.o. male patient who presents for evaluation of the right shoulder.  The patient is referred to me for further evaluation by Dr. Miller.  The patient has a long history of worsening right shoulder pain and dysfunction.  Pain is described as moderate to severe, constant and aching. He reports difficulty with overhead activities, reaching and lifting. He was referred to PT.  He attended 1 session and has been doing some home exercises on his own.  He does not really feel like the PT helped all that much although admittedly he only got 1 formal session.  He says that he has not had any injections.  He used to get injections for his knees.  He says that the injections caused his blood sugars to go up over 300 and he ended up hospitalized for several days afterwards because they could not get his sugars down.  He is very hesitant to pursue any further injections.  He was referred to me to discuss surgical options.    Allergies   Allergen Reactions    Benazepril-Hydrochlorothiazide Cough    Zocor [Simvastatin] Other (See Comments)     MUSCLE PAIN    Atorvastatin Other (See Comments)    Empagliflozin Other (See Comments)       Home Medications:    Current Outpatient Medications:     acetaminophen (TYLENOL) 650 MG 8 hr tablet, Take 1 tablet by mouth Every 8 (Eight) Hours As Needed., Disp: , Rfl:     amitriptyline (ELAVIL) 10 MG tablet, Take 1 tablet by mouth Daily., Disp: , Rfl:     amLODIPine (NORVASC) 10 MG tablet, Take 1 tablet by mouth Daily., Disp: 90 tablet, Rfl: 3    amoxicillin (AMOXIL) 500 MG capsule, TAKE 4 CAPSULES BY MOUTH 1 HOUR PRIOR TO DENTAL APPOINTMENT, Disp: , Rfl:     aspirin 81 MG chewable tablet, Chew 1 tablet Daily., Disp: , Rfl:     Bacillus Coagulans-Inulin (Probiotic) 1-250 BILLION-MG capsule, See Admin  Instructions., Disp: , Rfl:     cetirizine (ZyrTEC) 10 MG tablet, Take 1 tablet by mouth Daily As Needed., Disp: , Rfl:     dapagliflozin Propanediol (Farxiga) 10 MG tablet, Take 10 mg by mouth Daily for 180 days., Disp: 90 tablet, Rfl: 1    docusate sodium (COLACE) 100 MG capsule, Take 1 capsule by mouth 2 (Two) Times a Day. (Patient taking differently: Take 1 capsule by mouth Daily As Needed.), Disp: 60 capsule, Rfl: 0    famotidine (PEPCID) 20 MG tablet, Take 1 tablet by mouth 2 (Two) Times a Day., Disp: 60 tablet, Rfl: 3    glipizide (GLUCOTROL) 10 MG tablet, Take 1 tablet by mouth 2 (Two) Times a Day., Disp: , Rfl:     hydrocortisone 2.5 % ointment, , Disp: , Rfl:     Insulin Detemir (LEVEMIR FLEXPEN SC), Inject 52 Units under the skin into the appropriate area as directed Every Evening., Disp: , Rfl:     losartan (COZAAR) 50 MG tablet, Take 1 tablet by mouth Daily., Disp: , Rfl:     losartan-hydrochlorothiazide (HYZAAR) 50-12.5 MG per tablet, Take 1 tablet by mouth Every Morning., Disp: 90 tablet, Rfl: 3    loteprednol (LOTEMAX) 0.5 % ophthalmic suspension, Administer 1 drop into the left eye Every Morning., Disp: , Rfl:     metFORMIN (GLUCOPHAGE) 500 MG tablet, Take 1 tablet by mouth 2 (Two) Times a Day With Meals., Disp: , Rfl:     metoprolol tartrate (LOPRESSOR) 25 MG tablet, Take 1 tablet by mouth 2 (Two) Times a Day., Disp: , Rfl:     nitroglycerin (NITROSTAT) 0.4 MG SL tablet, Place 1 tablet under the tongue Every 5 (Five) Minutes As Needed for Chest Pain. Take no more than 3 doses in 15 minutes., Disp: , Rfl:     prednisoLONE acetate (PRED FORTE) 1 % ophthalmic suspension, Administer 1 drop to the right eye 4 (Four) Times a Day., Disp: , Rfl:     RELION PEN NEEDLE 31G/8MM 31G X 8 MM misc, USE 1 ONCE DAILY WITH INSULIN INJECTION, Disp: , Rfl:     rosuvastatin (CRESTOR) 20 MG tablet, Take 1 tablet by mouth Daily., Disp: , Rfl:   No current facility-administered medications for this  visit.    Facility-Administered Medications Ordered in Other Visits:     Chlorhexidine Gluconate 2 % pads 2 each, 2 pad , Apply externally, BID, Seth Floyd MD    Past Medical History:   Diagnosis Date    Asthma     Cancer     MELANOMA REMOVED FROM NOSE AND LT SHOULDER    Carpal tunnel syndrome     LT HAND    Coronary artery disease     Diabetes mellitus     TYPE 2    Diverticulosis of colon     Factor 5 Leiden mutation, heterozygous     GERD (gastroesophageal reflux disease)     H/O cornea transplant     Hip pain, chronic, left     History of melanoma excision     LT SHOULDER AND NOSE    History of skin cancer     History of stomach ulcers     Hypertension     IBS (irritable bowel syndrome)     Infectious viral hepatitis     CHILDHOOD    Joint pain     Neuropathy     IN FEET AND HANDS    Numbness or tingling     LEFT HAND    PONV (postoperative nausea and vomiting)     Right knee pain     Ringing in ears     Sleep apnea     CPAP    Stiffness in joint        Past Surgical History:   Procedure Laterality Date    CARDIAC CATHETERIZATION      X2    CARPAL TUNNEL RELEASE Left 4/20/2021    Procedure: CARPAL TUNNEL RELEASE;  Surgeon: Ren March MD;  Location: [facility] OR [facility];  Service: Orthopedics;  Laterality: Left;    CHOLECYSTECTOMY      COLONOSCOPY      COLONOSCOPY N/A 8/17/2022    Procedure: COLONOSCOPY WITH POLYPECTOMY;  Surgeon: Ozzy Hudson MD;  Location: [facility] ENDOSCOPY;  Service: Gastroenterology;  Laterality: N/A;  COLON POLYP, DIVERTICULOSIS    CORNEAL TRANSPLANT      GARETT    CORONARY ANGIOPLASTY  2015    and 2011    ENDOSCOPY N/A 8/17/2022    Procedure: ESOPHAGOGASTRODUODENOSCOPY WITH POLYPECTOMY, ENDO CLIP APPLICATION X1. BIOPSY;  Surgeon: Ozzy Hudson MD;  Location: [facility] ENDOSCOPY;  Service: Gastroenterology;  Laterality: N/A;  DUODENUM POLYP, HIATAL HERNIA    EXCISION LESION Left     LT SHOULDER/  MELANOMA REMOVED    KNEE ARTHROSCOPY Right 3/20/2018    Procedure: RIGHT KNEE  ARTHROSCOPY, PARTIAL MEDIAL MENISECTOMY, ABRASSSION CHONDROPLASTY, AND LOOSE BODY REMOVAL;  Surgeon: Logan Roblero MD;  [place] [facility] [place] [facility] [place]: [place]opedics    KNEE SURGERY Right 06/23/2015    SCOPE    NASAL MASS EXCISION  2016    MELANOMA    ROTATOR CUFF REPAIR Left 2016    TOTAL KNEE ARTHROPLASTY Left 2/12/2019    Procedure: TOTAL KNEE ARTHROPLASTY;  Surgeon: Seth Floyd MD;  Location: [facility] MAIN OR;  Service: Orthopedics    TOTAL KNEE ARTHROPLASTY Right 6/14/2021    Procedure: TOTAL KNEE ARTHROPLASTY;  Surgeon: Seth Floyd MD;  Location: [facility] MAIN OR;  Service: Orthopedics;  Laterality: Right;       Social History     Occupational History    Occupation: Retired   Tobacco Use    Smoking status: Never    Smokeless tobacco: Never   Vaping Use    Vaping status: Never Used   Substance and Sexual Activity    Alcohol use: No    Drug use: No    Sexual activity: Defer      Social History     Social History Narrative    Not on file       Family History   Problem Relation Age of Onset    Diabetes Brother     Stomach cancer Other     Diabetes Other     Heart disease Other     Hypertension Other     Malig Hyperthermia Neg Hx     Colon cancer Neg Hx        Review of Systems:      Constitutional: Denies fever, shaking or chills   Eyes: Denies change in visual acuity   HEENT: Denies nasal congestion or sore throat   Respiratory: Denies cough or shortness of breath   Cardiovascular: Denies chest pain or edema  Endocrine: Denies tremors, palpitations, intolerance of heat or cold, polyuria, polydipsia.  GI: Denies abdominal pain, nausea, vomiting, bloody stools or diarrhea  : Denies frequency, urgency, incontinence, retention, or nocturia.  Musculoskeletal: Denies numbness, tingling or loss of motor function except as above  Integument: Denies rash, lesion or ulceration   Neurologic: Denies headache or focal weakness, deficits  Heme: Denies spontaneous or excessive bleeding, epistaxis, hematuria,  "melena, fatigue, enlarged or tender lymph nodes.      All other pertinent positives and negatives as noted above in HPI.    Physical Exam:   79 y.o. male    Vitals:    12/13/24 1343   Temp: 97.8 °F (36.6 °C)   Weight: 97.6 kg (215 lb 1.6 oz)   Height: 180.3 cm (71\")     General:  Patient is awake and alert.  Appears in no acute distress or discomfort.    Psych:  Affect and demeanor are appropriate.    Eyes:  Conjunctiva and sclera appear grossly normal.  Eyes track well and EOM seem to be intact.    Ears:  No gross abnormalities.  Hearing adequate for the exam.    Cardiovascular:  Regular rate and rhythm.    Lungs:  Good chest expansion.  Breathing unlabored.    Extremities: Right shoulder is examined. Skin is benign.  No palpable masses or adenopathy.  Moderate tenderness noted over anterior glenohumeral joint and rotator interval.  Motion is limited and uncomfortable--150 FE, 45 ER, T10 IR.  No instability.  4+ out of 5 strength with elevation in the scapular plane and external rotation.  Good motor and sensory function in lower arm and hand.  Brisk capillary refill in hand.  Palpable radial pulse.  Good skin turgor.    Imaging:  Previous x-rays including AP, scapular Y and axillary views of the right shoulder are reviewed.  The x-rays show significant cuff tear arthropathy.    MRI right shoulder is reviewed along with the report.  I have independently interpreted the images.  He appears to have a massive retracted rotator cuff tear with superior migration of the humeral head and abutment to the undersurface of the acromion.  He has advanced glenohumeral osteoarthritis as well.    Assessment/Plan:  Right rotator cuff tear arthropathy    We had a long discussion about conservative and surgical treatment options.  We talked about a reverse arthroplasty in detail and all that would potentially entail in terms of surgery itself, rehabilitation and recovery.  All risks, benefits and alternatives were thoroughly " discussed.  He acknowledged understanding this information.  He has stated that he is interested in pursuing the replacement but he is currently dealing with neck problems for which surgery has been recommended.  He is scheduled to have neck surgery in January.  He needs to get through the recovery from the neck surgery before he can consider getting the shoulder addressed.  He was interested in going back to formal physical therapy.  I gave him a referral.    We did have a long conversation about injections.  I told him my experience has been that they work fairly well and I think it would be a good option for him.  He would however need to be able to keep his blood sugars under control.  It might be an option for him to talk with his PCP about going on a short acting insulin temporarily if the shots caused his sugars to go up.  He says going to talk with his PCP about this possibility and then get back with us.  For now, he will follow-up as needed.    Ren March MD    12/13/2024

## (undated) DEVICE — PENCL E/S ULTRAVAC TELESCP NOSE HOLSTR 10FT

## (undated) DEVICE — BLD DISSCT COOL CUT SJ CRVD 4MM 13CM

## (undated) DEVICE — OCCLUSIVE GAUZE STRIP,3% BISMUTH TRIBROMOPHENATE IN PETROLATUM BLEND: Brand: XEROFORM

## (undated) DEVICE — KT DRN EVAC WND PVC PCH WTROC RND 10F400

## (undated) DEVICE — MAT FLR ABSORBENT LG 4FT 10 2.5FT

## (undated) DEVICE — PREMIUM WET SKIN PREP TRAY: Brand: MEDLINE INDUSTRIES, INC.

## (undated) DEVICE — ENCORE® LATEX ORTHO SIZE 7.5, STERILE LATEX POWDER-FREE SURGICAL GLOVE: Brand: ENCORE

## (undated) DEVICE — SINGLE-USE BIOPSY FORCEPS: Brand: RADIAL JAW 4

## (undated) DEVICE — SUT VIC 0 CT1 36IN J946H

## (undated) DEVICE — DUAL CUT SAGITTAL BLADE

## (undated) DEVICE — MEDI-VAC YANKAUER SUCTION HANDLE W/BULBOUS TIP: Brand: CARDINAL HEALTH

## (undated) DEVICE — UNDERCAST PADDING: Brand: DEROYAL

## (undated) DEVICE — PAD GRND REM POLYHESIVE A/ DISP

## (undated) DEVICE — Device: Brand: DEFENDO AIR/WATER/SUCTION AND BIOPSY VALVE

## (undated) DEVICE — PAD,ABDOMINAL,8"X10",ST,LF: Brand: MEDLINE

## (undated) DEVICE — BNDG ELAS MATRX  2IN 5YD LF STRL

## (undated) DEVICE — DRAPE,REIN 53X77,STERILE: Brand: MEDLINE

## (undated) DEVICE — BNDG ELAS ELITE V/CLOSE 6IN 5YD LF STRL

## (undated) DEVICE — PICO 7 10CM X 30CM: Brand: PICO™ 7

## (undated) DEVICE — APPL CHLORAPREP W/TINT 26ML ORNG

## (undated) DEVICE — GLV SURG BIOGEL LTX PF 6 1/2

## (undated) DEVICE — SKIN PREP TRAY W/CHG: Brand: MEDLINE INDUSTRIES, INC.

## (undated) DEVICE — GLV SURG SIGNATURE ESSENTIAL PF LTX SZ8.5

## (undated) DEVICE — SUT VIC 5/0 P3 18IN J493G

## (undated) DEVICE — TRAP FLD MINIVAC MEGADYNE 100ML

## (undated) DEVICE — GLV SURG PREMIERPRO ORTHO LTX PF SZ7.5 BRN

## (undated) DEVICE — APPL CHLORAPREP HI/LITE 26ML ORNG

## (undated) DEVICE — GLV SURG TRIUMPH CLASSIC PF LTX 8 STRL

## (undated) DEVICE — SNAR E/S POLYP SNAREMASTER OVL/10MM 2.8X2300MM YEL

## (undated) DEVICE — SUT VIC 1 CT1 36IN J947H

## (undated) DEVICE — GLV SURG BIOGEL LTX PF 8 1/2

## (undated) DEVICE — WEBRIL* CAST PADDING: Brand: DEROYAL

## (undated) DEVICE — SOL IRRG H2O PL/BG 1000ML STRL

## (undated) DEVICE — STPLR SKIN VISISTAT WD 35CT

## (undated) DEVICE — SUT ETHLN 4/0 PS2 PLSTC 1667G

## (undated) DEVICE — APPL DURAPREP IODOPHOR APL 26ML

## (undated) DEVICE — GLV SURG SIGNATURE ESSENTIAL PF LTX SZ7

## (undated) DEVICE — 3M™ IOBAN™ 2 ANTIMICROBIAL INCISE DRAPE 6640EZ: Brand: IOBAN™ 2

## (undated) DEVICE — GLV SURG SENSICARE PI MIC PF SZ7 LF STRL

## (undated) DEVICE — GLV SURG SENSICARE W/ALOE PF LF 8 STRL

## (undated) DEVICE — SYS CLS SKIN PREMIERPRO EXOFINFUSION 22CM

## (undated) DEVICE — GAUZE,SPONGE,FLUFF,6"X6.75",STRL,10/TRAY: Brand: MEDLINE

## (undated) DEVICE — DRAPE,U/ SHT,SPLIT,PLAS,STERIL: Brand: MEDLINE

## (undated) DEVICE — PK KN TOTL 40

## (undated) DEVICE — NEEDLE, QUINCKE 22GX3.5": Brand: MEDLINE INDUSTRIES, INC.

## (undated) DEVICE — COLON KIT: Brand: MEDLINE INDUSTRIES, INC.

## (undated) DEVICE — BNDG ESMARK STRL 6INX12FT LF

## (undated) DEVICE — PREP SOL POVIDONE/IODINE BT 4OZ

## (undated) DEVICE — SOL NACL 0.9PCT 100ML SGL

## (undated) DEVICE — DISPOSABLE TOURNIQUET CUFF SINGLE BLADDER, SINGLE PORT AND QUICK CONNECT CONNECTOR: Brand: COLOR CUFF

## (undated) DEVICE — SOL ISO/ALC RUB 70PCT 4OZ

## (undated) DEVICE — SUPER TURBOVAC 90 IFS: Brand: COBLATION

## (undated) DEVICE — GOWN,NON-REINFORCED,SIRUS,SET IN SLV,XXL: Brand: MEDLINE

## (undated) DEVICE — STCKNT IMPERV 9X36IN STRL

## (undated) DEVICE — THE SINGLE USE ETRAP – POLYP TRAP IS USED FOR SUCTION RETRIEVAL OF ENDOSCOPICALLY REMOVED POLYPS.: Brand: ETRAP

## (undated) DEVICE — NDL SPINE 22G 31/2IN BLK

## (undated) DEVICE — DISPOSABLE BIPOLAR FORCEPS 4" (10.2CM) JEWELERS, STRAIGHT 0.4MM TIP AND 12 FT. (3.6M) CABLE: Brand: KIRWAN

## (undated) DEVICE — PK ARTHSCP 40

## (undated) DEVICE — GLV SURG SENSICARE W/ALOE PF LF 7.5 STRL

## (undated) DEVICE — GLV SURG SENSICARE PI PF LF 7 GRN STRL

## (undated) DEVICE — BNDG ELAS ELITE V/CLOSE 4IN 5YD LF STRL

## (undated) DEVICE — EGD OR ERCP KIT: Brand: MEDLINE INDUSTRIES, INC.

## (undated) DEVICE — GLV SURG SENSICARE MICRO PF LF 7 STRL

## (undated) DEVICE — TBG PUMP ARTHSCP MAIN AR6400 16FT

## (undated) DEVICE — BLD SHAVER EXCALIBUR 4MM 13CM

## (undated) DEVICE — PK ORTHO MINOR TOWER 40

## (undated) DEVICE — DRSNG SURESITE WNDW 2.38X2.75

## (undated) DEVICE — GLV SURG BIOGEL LTX PF 8

## (undated) DEVICE — STERILE PATIENT PROTECTIVE PAD FOR IMP® KNEE POSITIONERS & COHESIVE WRAP (10 / CASE): Brand: DE MAYO KNEE POSITIONER®

## (undated) DEVICE — DRSNG WND GZ CURAD OIL EMULSION 3X3IN STRL